# Patient Record
Sex: MALE | HISPANIC OR LATINO | URBAN - METROPOLITAN AREA
[De-identification: names, ages, dates, MRNs, and addresses within clinical notes are randomized per-mention and may not be internally consistent; named-entity substitution may affect disease eponyms.]

---

## 2018-01-11 ENCOUNTER — INPATIENT (INPATIENT)
Facility: HOSPITAL | Age: 68
LOS: 14 days | Discharge: HOME | DRG: 64 | End: 2018-01-26
Attending: HOSPITALIST | Admitting: INTERNAL MEDICINE
Payer: MEDICARE

## 2018-01-11 ENCOUNTER — EMERGENCY (EMERGENCY)
Facility: HOSPITAL | Age: 68
LOS: 1 days | Discharge: ANOTHER HOSPITAL | End: 2018-01-11
Attending: EMERGENCY MEDICINE | Admitting: EMERGENCY MEDICINE
Payer: MEDICARE

## 2018-01-11 ENCOUNTER — TRANSCRIPTION ENCOUNTER (OUTPATIENT)
Age: 68
End: 2018-01-11

## 2018-01-11 VITALS
DIASTOLIC BLOOD PRESSURE: 141 MMHG | HEIGHT: 61 IN | OXYGEN SATURATION: 95 % | HEART RATE: 94 BPM | TEMPERATURE: 97 F | SYSTOLIC BLOOD PRESSURE: 241 MMHG | WEIGHT: 164.91 LBS | RESPIRATION RATE: 16 BRPM

## 2018-01-11 VITALS
HEART RATE: 87 BPM | SYSTOLIC BLOOD PRESSURE: 172 MMHG | OXYGEN SATURATION: 99 % | DIASTOLIC BLOOD PRESSURE: 79 MMHG | RESPIRATION RATE: 19 BRPM

## 2018-01-11 VITALS — WEIGHT: 166.89 LBS

## 2018-01-11 DIAGNOSIS — I61.0 NONTRAUMATIC INTRACEREBRAL HEMORRHAGE IN HEMISPHERE, SUBCORTICAL: ICD-10-CM

## 2018-01-11 DIAGNOSIS — R41.82 ALTERED MENTAL STATUS, UNSPECIFIED: ICD-10-CM

## 2018-01-11 DIAGNOSIS — I16.9 HYPERTENSIVE CRISIS, UNSPECIFIED: ICD-10-CM

## 2018-01-11 DIAGNOSIS — I62.9 NONTRAUMATIC INTRACRANIAL HEMORRHAGE, UNSPECIFIED: ICD-10-CM

## 2018-01-11 LAB
ALBUMIN SERPL ELPH-MCNC: 3.9 G/DL — SIGNIFICANT CHANGE UP (ref 3.3–5)
ALP SERPL-CCNC: 153 U/L — HIGH (ref 40–120)
ALT FLD-CCNC: 20 U/L — SIGNIFICANT CHANGE UP (ref 12–78)
ANION GAP SERPL CALC-SCNC: 10 MMOL/L — SIGNIFICANT CHANGE UP (ref 5–17)
APTT BLD: 33.4 SEC — SIGNIFICANT CHANGE UP (ref 27.5–37.4)
AST SERPL-CCNC: 22 U/L — SIGNIFICANT CHANGE UP (ref 15–37)
BASOPHILS # BLD AUTO: 0.1 K/UL — SIGNIFICANT CHANGE UP (ref 0–0.2)
BASOPHILS NFR BLD AUTO: 1 % — SIGNIFICANT CHANGE UP (ref 0–2)
BILIRUB SERPL-MCNC: 0.6 MG/DL — SIGNIFICANT CHANGE UP (ref 0.2–1.2)
BUN SERPL-MCNC: 21 MG/DL — SIGNIFICANT CHANGE UP (ref 7–23)
CALCIUM SERPL-MCNC: 8.7 MG/DL — SIGNIFICANT CHANGE UP (ref 8.5–10.1)
CHLORIDE SERPL-SCNC: 98 MMOL/L — SIGNIFICANT CHANGE UP (ref 96–108)
CK MB BLD-MCNC: 1.7 % — SIGNIFICANT CHANGE UP (ref 0–3.5)
CK MB CFR SERPL CALC: 2.1 NG/ML — SIGNIFICANT CHANGE UP (ref 0–3.6)
CK SERPL-CCNC: 121 U/L — SIGNIFICANT CHANGE UP (ref 26–308)
CO2 SERPL-SCNC: 27 MMOL/L — SIGNIFICANT CHANGE UP (ref 22–31)
CREAT SERPL-MCNC: 1.3 MG/DL — SIGNIFICANT CHANGE UP (ref 0.5–1.3)
EOSINOPHIL # BLD AUTO: 1.1 K/UL — HIGH (ref 0–0.5)
EOSINOPHIL NFR BLD AUTO: 15 % — HIGH (ref 0–6)
GLUCOSE BLDC GLUCOMTR-MCNC: 272 MG/DL — HIGH (ref 70–99)
GLUCOSE SERPL-MCNC: 272 MG/DL — HIGH (ref 70–99)
HCT VFR BLD CALC: 41.3 % — SIGNIFICANT CHANGE UP (ref 34.5–45)
HGB BLD-MCNC: 14.2 G/DL — SIGNIFICANT CHANGE UP (ref 11.5–15.5)
INR BLD: 0.99 RATIO — SIGNIFICANT CHANGE UP (ref 0.88–1.16)
LYMPHOCYTES # BLD AUTO: 1.9 K/UL — SIGNIFICANT CHANGE UP (ref 1–3.3)
LYMPHOCYTES # BLD AUTO: 26.9 % — SIGNIFICANT CHANGE UP (ref 13–44)
MCHC RBC-ENTMCNC: 28.7 PG — SIGNIFICANT CHANGE UP (ref 27–34)
MCHC RBC-ENTMCNC: 34.3 GM/DL — SIGNIFICANT CHANGE UP (ref 32–36)
MCV RBC AUTO: 83.6 FL — SIGNIFICANT CHANGE UP (ref 80–100)
MONOCYTES # BLD AUTO: 0.5 K/UL — SIGNIFICANT CHANGE UP (ref 0–0.9)
MONOCYTES NFR BLD AUTO: 6.9 % — SIGNIFICANT CHANGE UP (ref 1–9)
NEUTROPHILS # BLD AUTO: 3.6 K/UL — SIGNIFICANT CHANGE UP (ref 1.8–7.4)
NEUTROPHILS NFR BLD AUTO: 50.3 % — SIGNIFICANT CHANGE UP (ref 43–77)
PLATELET # BLD AUTO: 244 K/UL — SIGNIFICANT CHANGE UP (ref 150–400)
POTASSIUM SERPL-MCNC: 3.8 MMOL/L — SIGNIFICANT CHANGE UP (ref 3.5–5.3)
POTASSIUM SERPL-SCNC: 3.8 MMOL/L — SIGNIFICANT CHANGE UP (ref 3.5–5.3)
PROT SERPL-MCNC: 8.2 G/DL — SIGNIFICANT CHANGE UP (ref 6–8.3)
PROTHROM AB SERPL-ACNC: 10.8 SEC — SIGNIFICANT CHANGE UP (ref 9.8–12.7)
RBC # BLD: 4.94 M/UL — SIGNIFICANT CHANGE UP (ref 3.8–5.2)
RBC # FLD: 11.5 % — SIGNIFICANT CHANGE UP (ref 10.3–14.5)
SODIUM SERPL-SCNC: 135 MMOL/L — SIGNIFICANT CHANGE UP (ref 135–145)
TROPONIN I SERPL-MCNC: <.015 NG/ML — SIGNIFICANT CHANGE UP (ref 0.01–0.04)
WBC # BLD: 7.2 K/UL — SIGNIFICANT CHANGE UP (ref 3.8–10.5)
WBC # FLD AUTO: 7.2 K/UL — SIGNIFICANT CHANGE UP (ref 3.8–10.5)

## 2018-01-11 PROCEDURE — 99291 CRITICAL CARE FIRST HOUR: CPT

## 2018-01-11 PROCEDURE — 70450 CT HEAD/BRAIN W/O DYE: CPT | Mod: 26

## 2018-01-11 PROCEDURE — 85730 THROMBOPLASTIN TIME PARTIAL: CPT

## 2018-01-11 PROCEDURE — 71045 X-RAY EXAM CHEST 1 VIEW: CPT

## 2018-01-11 PROCEDURE — 86900 BLOOD TYPING SEROLOGIC ABO: CPT

## 2018-01-11 PROCEDURE — 86850 RBC ANTIBODY SCREEN: CPT

## 2018-01-11 PROCEDURE — 70496 CT ANGIOGRAPHY HEAD: CPT | Mod: 26

## 2018-01-11 PROCEDURE — 70450 CT HEAD/BRAIN W/O DYE: CPT

## 2018-01-11 PROCEDURE — 86901 BLOOD TYPING SEROLOGIC RH(D): CPT

## 2018-01-11 PROCEDURE — 96374 THER/PROPH/DIAG INJ IV PUSH: CPT

## 2018-01-11 PROCEDURE — 72125 CT NECK SPINE W/O DYE: CPT | Mod: 26

## 2018-01-11 PROCEDURE — 93005 ELECTROCARDIOGRAM TRACING: CPT

## 2018-01-11 PROCEDURE — 82550 ASSAY OF CK (CPK): CPT

## 2018-01-11 PROCEDURE — 82553 CREATINE MB FRACTION: CPT

## 2018-01-11 PROCEDURE — 85027 COMPLETE CBC AUTOMATED: CPT

## 2018-01-11 PROCEDURE — 99291 CRITICAL CARE FIRST HOUR: CPT | Mod: 25

## 2018-01-11 PROCEDURE — 85610 PROTHROMBIN TIME: CPT

## 2018-01-11 PROCEDURE — 82962 GLUCOSE BLOOD TEST: CPT

## 2018-01-11 PROCEDURE — 96375 TX/PRO/DX INJ NEW DRUG ADDON: CPT

## 2018-01-11 PROCEDURE — 84484 ASSAY OF TROPONIN QUANT: CPT

## 2018-01-11 PROCEDURE — 71045 X-RAY EXAM CHEST 1 VIEW: CPT | Mod: 26

## 2018-01-11 PROCEDURE — 80053 COMPREHEN METABOLIC PANEL: CPT

## 2018-01-11 RX ORDER — NICARDIPINE HYDROCHLORIDE 30 MG/1
5 CAPSULE, EXTENDED RELEASE ORAL
Qty: 40 | Refills: 0 | Status: DISCONTINUED | OUTPATIENT
Start: 2018-01-11 | End: 2018-01-12

## 2018-01-11 RX ORDER — LABETALOL HCL 100 MG
20 TABLET ORAL ONCE
Refills: 0 | Status: COMPLETED | OUTPATIENT
Start: 2018-01-11 | End: 2018-01-11

## 2018-01-11 RX ORDER — LABETALOL HCL 100 MG
10 TABLET ORAL
Refills: 0 | Status: DISCONTINUED | OUTPATIENT
Start: 2018-01-11 | End: 2018-01-13

## 2018-01-11 RX ORDER — NICARDIPINE HYDROCHLORIDE 30 MG/1
5 CAPSULE, EXTENDED RELEASE ORAL
Qty: 50 | Refills: 0 | Status: DISCONTINUED | OUTPATIENT
Start: 2018-01-11 | End: 2018-01-15

## 2018-01-11 RX ORDER — INFLUENZA VIRUS VACCINE 15; 15; 15; 15 UG/.5ML; UG/.5ML; UG/.5ML; UG/.5ML
0.5 SUSPENSION INTRAMUSCULAR ONCE
Refills: 0 | Status: COMPLETED | OUTPATIENT
Start: 2018-01-11 | End: 2018-01-11

## 2018-01-11 RX ADMIN — NICARDIPINE HYDROCHLORIDE 25 MG/HR: 30 CAPSULE, EXTENDED RELEASE ORAL at 20:09

## 2018-01-11 RX ADMIN — NICARDIPINE HYDROCHLORIDE 25 MG/HR: 30 CAPSULE, EXTENDED RELEASE ORAL at 16:05

## 2018-01-11 RX ADMIN — Medication 10 MILLIGRAM(S): at 21:00

## 2018-01-11 RX ADMIN — Medication 20 MILLIGRAM(S): at 15:30

## 2018-01-11 NOTE — DISCHARGE NOTE ADULT - NS AS ACTIVITY OBS
No Heavy lifting/straining/Do not drive or operate machinery/Showering allowed/Walking-Indoors allowed/Walking-Outdoors allowed

## 2018-01-11 NOTE — DISCHARGE NOTE ADULT - MEDICATION SUMMARY - MEDICATIONS TO TAKE
I will START or STAY ON the medications listed below when I get home from the hospital:    aspirin 81 mg oral tablet, chewable  -- 1 tab(s) by mouth once a day  -- Indication: For Coronary artery disease involving native coronary artery of native heart with angina pectoris    cloNIDine 0.1 mg oral tablet  -- 1 tab(s) by mouth 2 times a day  -- Indication: For Essential hypertension    insulin glargine  -- 14 unit(s) subcutaneous once a day (at bedtime)  -- Indication: For Diabetes    atorvastatin 40 mg oral tablet  -- 1 tab(s) by mouth once a day (at bedtime)  -- Indication: For Coronary artery disease involving native coronary artery of native heart with angina pectoris    carvedilol 25 mg oral tablet  -- 1 tab(s) by mouth every 12 hours  -- Indication: For Essential hypertension    amLODIPine 10 mg oral tablet  -- 1 tab(s) by mouth once a day  -- Indication: For Essential hypertension    furosemide 20 mg oral tablet  -- 1 tab(s) by mouth once a day  -- Indication: For Cardiomyopathy, unspecified type    pantoprazole 40 mg oral delayed release tablet  -- 1 tab(s) by mouth once a day (before a meal)  -- Indication: For GERD    hydrALAZINE 50 mg oral tablet  -- 1 tab(s) by mouth 3 times a day  -- Indication: For Essential hypertension

## 2018-01-11 NOTE — ED ADULT NURSE NOTE - OBJECTIVE STATEMENT
BIBA from [place] for brain bleed with midline shift. Pt is awake, alert, unable to speak, cardiac and O2 monitors placed, Code Team One, Dr Deng at bedside. BIBA from [place] for brain bleed with midline shift. Pt is awake, alert, able to speak but slow to respond, cardiac and O2 monitors placed, Code Team One, Dr Deng at bedside. Pt reports patching his roof and does not remember what happened next. C-spine immobilization initiated. Pt c/o of pain to LLE and noted to have spasticity and twitching to same.

## 2018-01-11 NOTE — ED ADULT NURSE NOTE - OBJECTIVE STATEMENT
Pt. received alert and oriented x4 with chief complaint of falling down on roof while at work around 1330 today. Pt. presents w/ left sided facial droop w/ left sided arm weakness, and left sided leg numbness/weakness. Pt. placed on continuous cardiac monitor with continuos pulse ox.

## 2018-01-11 NOTE — ED PROVIDER NOTE - OBJECTIVE STATEMENT
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  Pt states he has residual weakness from previous CVA 1 year ago on the left but no has more ;eft upper and lower weakness/numbness and right facial droop.

## 2018-01-11 NOTE — CONSULT NOTE ADULT - SUBJECTIVE AND OBJECTIVE BOX
HISTORY OF PRESENT ILLNESS:   Mr. Valdez is a 68 y/o male, not followed regularly by a PCP, possible history of uncontrolled hypertension. Presented to [facility] after he recalls being on a ladder putting up a shingle when he believes he lost consciousness and fell from the ladder. Reports he does not recall any further details.   Upon initial work up HCT revealed L thalamic hemorrhage and was hypertensive on presentation. Due to imaging he was transferred to [facility] for further evaluation.   At time of evaluation in [facility]-ED, pt is denying neck pain. He is exhibiting myoclonic jerking vs. spasticity of the LLE, occasionally of the RLE as well and reports nonspecific L arm pain. Denies low back pain. He denies any history of seizures. Denies loss of bowel or bladder control. Denies any other medical or surgical history.     PAST MEDICAL & SURGICAL HISTORY:  HTN   Denies significant past surgical history    FAMILY HISTORY:  Unable to obtain    SOCIAL HISTORY:  Denies tobacco or alcohol use  Employed    Allergies  No Known Allergies    REVIEW OF SYSTEMS  Negative as per HPI    MEDICATIONS:  Anticoagulation:  Denies  OTHER:  niCARdipine Infusion 5 mG/Hr IV Continuous    Vital Signs Last 24 Hrs  T(C): 36.3 (11 Jan 2018 17:33), Max: 36.3 (11 Jan 2018 17:33)  T(F): 97.4 (11 Jan 2018 17:33), Max: 97.4 (11 Jan 2018 17:33)  HR: 92 (11 Jan 2018 18:10) (86 - 92)  BP: 162/79 (11 Jan 2018 18:10) (153/98 - 172/84)  RR: 20 (11 Jan 2018 18:10) (20 - 24)  SpO2: 99% (11 Jan 2018 18:10) (99% - 99%)  PHYSICAL EXAM:  GENERAL: NAD, well-developed  HEAD:  Atraumatic, normocephalic  EYES: Conjunctiva and sclera gonzales  ENMT: No tonsillar erythema, exudates, or enlargement; moist mucous membranes  NECK: Denies pain to palpation, no step offs palpated, no nuchal rigidity  MENTAL STATUS: Awake, alert, oriented to month and year, not oriented to place, oriented to self. Following commands, mild dysarthria per , no overt expressive or comprehensive aphasia  CRANIAL NERVES: PERRL 4mm; EOMI; mild diminished L NLF, facial sensation unable to evaluate  MOTOR: strength 5/5 RUE/RLE. 4-/5 L elbows, distal fine motor function of the left intact, antigravity proximal LUE. LLE 2+/5, RLE grossly intact. + LUE/LLE rigidity with intermittent high frequency low amplitude spastic vs. myoclonic jerking <3seconds, intermittently in RLE as well, lower extremities moreso than right  SENSATION: Extinction to double sided simultaneous stimulation on the left upper and lower extremities  MUSCLE STRETCH REFLEXES: +clonus L>R  PLANTAR: upgoing L  CHEST/LUNG: Clear to auscultation bilaterally  HEART: +S1/+S2; Regular rate and rhythm  ABDOMEN: Soft, nontender, nondistended; bowel sounds present all four quadrants  EXTREMITIES:  2+ peripheral pulses, no clubbing, cyanosis, or edema  SKIN: Warm, dry; no rashes or lesions    LABS:  PENDING    RADIOLOGY & ADDITIONAL STUDIES:  CTH from [place] in [facility]    CTH/CTA/CTV/CTCSPINE pending

## 2018-01-11 NOTE — DISCHARGE NOTE ADULT - SECONDARY DIAGNOSIS.
Coronary artery disease involving native coronary artery of native heart with angina pectoris Acute cholecystitis due to biliary calculus Essential hypertension Diabetes

## 2018-01-11 NOTE — DISCHARGE NOTE ADULT - PLAN OF CARE
Optimal cardiac function No heavy lifting, driving, sex, tub baths, swimming, or any activity that submerges the lower half of the body in water for 48 hours.  Limited walking and stairs for 48 hours.    Observe the site frequently.  If bleeding or a large lump (the size of a golf ball or bigger) occurs lie flat, apply continuous direct pressure just above the puncture site for at least 10 minutes, and notify your physician immediately.  If the bleeding cannot be controlled, call 911 immediately for assistance.  Notify your physician of pain, swelling or any drainage.    Notify your physician immediately if coldness, numbness, discoloration or pain in your foot occurs. Continue current medications as prescribed.  Follow up with primary doctor. Rehab Continue current medications as prescribed.  Follow up with primary doctor.  Follow up with cardiology - will need eventual cardiac Cath with stent placement when able to take ASA and Plavix. Continue rehab. Completed antibiotics.  Follow up with surgery in 4-6 weeks.  Noemi tube to be removed around 3/3/18.

## 2018-01-11 NOTE — DISCHARGE NOTE ADULT - NS AS DC FOLLOWUP STROKE INST
Stroke (includes: TIA/SAH/ICH/Ischemic Stroke) Influenza vaccination (VIS Pub Date: August 19, 2014)/Stroke (includes: TIA/SAH/ICH/Ischemic Stroke)

## 2018-01-11 NOTE — DISCHARGE NOTE ADULT - HOSPITAL COURSE
67 yom transferred from [place] after having left sided weakness found to have ICH likely hypertensive. Pt was initially managed in ICU until stable, stroke workup complete, neurology following.  Cardio decided to cath pt yesterday as part of workup and pt was NPO, post procedure he was found to be extremely hypertensive with HTN crisis and was brought to ICU BP mgt.  Pt was found to have a 70% midLAD lesion that will be medically managed for now due to fact that he is currently not candidate for ASA/Plavix due to ICH> spoke with cardio NP and pt not on ASA/Plavix at this time due to ICH and they are aware of this, eventually they will d/w neurology/NS about when it will be safe to restart these meds. s/p ICU downgrade. Hospital course complicated by abdominal pain, N/V suspected acute brooks. Now s/p cholecystostomy by IR, completed IV ab. Cr improved with IVF.  Antihypertensives adjusted with improvement.  Patient evaluated by PT and PM&R and recommended Acute rehab.  Patient is stable for discharge to acute rehab when bed available. Patient will need follow up with surgery for removal of brooks tube in 4-6 weeks which will be around 3/3/18.  Patient also needs to follow up with cardiology for eventual cardiac Cath with stent placement when patient able to take ASA and Plavix. 67 yom transferred from [place] after having left sided weakness found to have ICH likely hypertensive. Pt was initially managed in ICU until stable, stroke workup complete, neurology following.  Cardio decided to cath pt yesterday as part of workup and pt was NPO, post procedure he was found to be extremely hypertensive with HTN crisis and was brought to ICU BP mgt.  Pt was found to have a 70% midLAD lesion that will be medically managed for now due to fact that he is currently not candidate for ASA/Plavix due to ICH> spoke with cardio NP and pt not on ASA/Plavix at this time due to ICH and they are aware of this, eventually they will d/w neurology/NS about when it will be safe to restart these meds. s/p ICU downgrade. Hospital course complicated by abdominal pain, N/V suspected acute brooks. Now s/p cholecystostomy by IR, completed IV ab. Cr improved with IVF.  Antihypertensives adjusted with improvement.  Patient evaluated by PT and PM&R and recommended Acute rehab.  Patient is stable for discharge to acute rehab when bed available. Patient will need follow up with surgery for removal of brooks tube in 4-6 weeks which will be around 3/3/18.  Patient also needs to follow up with cardiology for eventual cardiac Cath with stent placement when patient able to take ASA and Plavix.      Time to discharge: >35 minutes spent coordinating

## 2018-01-11 NOTE — H&P ADULT - PROBLEM SELECTOR PLAN 1
Likely related to uncontrolled HTN  Need to exclude vascular injury. CTA/V now being done. Await formal read and will discuss with Neurosurgery  Continue hourly Neurochecks  Continue IV Cardene to maintain SBP < 150, may need to add additional IV meds.  Keep NPO for NOw  StroKe Protocol  Neurology consult

## 2018-01-11 NOTE — ED ADULT NURSE REASSESSMENT NOTE - NS ED NURSE REASSESS COMMENT FT1
bedside report given to Brendon STUART in the MICU, pt status unchanged, refer to flowsheet and chart, pt safety maintained, pt hemodynamically stable on cardene gtt

## 2018-01-11 NOTE — ED ADULT TRIAGE NOTE - CHIEF COMPLAINT QUOTE
pt transfer from [place] with intracranial bleed left sided numbness/weakness. patient arrives on cardene 7.5. to critical care pod for further treatment. code team 1 called to bedside.

## 2018-01-11 NOTE — ED PROVIDER NOTE - OBJECTIVE STATEMENT
Pt is a transfer from [place] for intracranial bleeding, accepted by duy gallagher. pt with hpyertensive bleed of right thalamus and corona radiata. Pt in the ED confused with some questions. pt attempting to follow commands and answers, but unable to obtain additional history secondary to emergent need for treatment. Pt is a transfer from [place] for intracranial bleeding, accepted by duy gallagher. pt with hypertensive bleed of right thalamus and corona radiata. Pt in the ED confused with some questions. pt attempting to follow commands and answers, but unable to obtain additional history secondary to emergent need for treatment.

## 2018-01-11 NOTE — ED PROVIDER NOTE - MEDICAL DECISION MAKING DETAILS
pt with hx of cva and htn with high BP  rigth facial droop and left ext numbness/weakness.  ct with hemorrhagic stroke.  transfer for higher level of care

## 2018-01-11 NOTE — DISCHARGE NOTE ADULT - CARE PLAN
Goal:	Optimal cardiac function  Assessment and plan of treatment:	No heavy lifting, driving, sex, tub baths, swimming, or any activity that submerges the lower half of the body in water for 48 hours.  Limited walking and stairs for 48 hours.    Observe the site frequently.  If bleeding or a large lump (the size of a golf ball or bigger) occurs lie flat, apply continuous direct pressure just above the puncture site for at least 10 minutes, and notify your physician immediately.  If the bleeding cannot be controlled, call 911 immediately for assistance.  Notify your physician of pain, swelling or any drainage.    Notify your physician immediately if coldness, numbness, discoloration or pain in your foot occurs. Principal Discharge DX:	Intracranial bleed  Goal:	Rehab  Assessment and plan of treatment:	Continue rehab.  Secondary Diagnosis:	Acute cholecystitis due to biliary calculus  Assessment and plan of treatment:	Completed antibiotics.  Follow up with surgery in 4-6 weeks.  Noemi tube to be removed around 3/3/18.  Secondary Diagnosis:	Coronary artery disease involving native coronary artery of native heart with angina pectoris  Assessment and plan of treatment:	Continue current medications as prescribed.  Follow up with primary doctor.  Follow up with cardiology - will need eventual cardiac Cath with stent placement when able to take ASA and Plavix.  Secondary Diagnosis:	Diabetes  Assessment and plan of treatment:	Continue current medications as prescribed.  Follow up with primary doctor.  Secondary Diagnosis:	Essential hypertension  Assessment and plan of treatment:	Continue current medications as prescribed.  Follow up with primary doctor.

## 2018-01-11 NOTE — DISCHARGE NOTE ADULT - CARE PROVIDER_API CALL
Gilbert Banegas), Surgery  [address]  [address]  [place] [address]  Phone: [phone]  Fax: [phone]    Kenneth Andrew), Neurology; Vascular Neurology  [address]  [address]  [place] [address]  Phone: [phone]  Fax: [phone]    Wil Rob ([facility]), Cardiology; Cardiovascular Disease; Interventional Cardiology  [address]  [address] [place] [id number]  Phone: [phone]  Fax: [phone]

## 2018-01-11 NOTE — DISCHARGE NOTE ADULT - PATIENT PORTAL LINK FT
“You can access the [employer] Patient Portal, offered by [facility], by registering with the following website: [email or web address]”

## 2018-01-11 NOTE — DISCHARGE NOTE ADULT - MEDICATION SUMMARY - MEDICATIONS TO STOP TAKING
I will STOP taking the medications listed below when I get home from the hospital:    metFORMIN 500 mg oral tablet  -- 1 tab(s) by mouth 2 times a day    enalapril 10 mg oral tablet  -- 1 tab(s) by mouth 2 times a day    metoprolol tartrate 50 mg oral tablet  -- 1 tab(s) by mouth 2 times a day

## 2018-01-11 NOTE — H&P ADULT - HISTORY OF PRESENT ILLNESS
66M transferred from [facility]. PMHX of HTN He presented AMS. Head Ct showed a Right Thalamic Hemorrhagic Bleed with some Effacement of the Lateral Ventricle and surrounding Edema.  He was brought to [facility] for further management and Neurosurgical evaluation. His BP in the ER was 170 and he was placed on a Cardene Infusion.

## 2018-01-11 NOTE — H&P ADULT - ASSESSMENT
IMpression/plan    66M with Hemorrhagic CVA and uncontrolled HTN now requiring IV Cardene for BP control

## 2018-01-12 LAB
GLUCOSE BLDC GLUCOMTR-MCNC: 298 MG/DL — HIGH (ref 70–99)
TROPONIN T SERPL-MCNC: <0.01 NG/ML — SIGNIFICANT CHANGE UP (ref 0–0.06)

## 2018-01-12 PROCEDURE — 93010 ELECTROCARDIOGRAM REPORT: CPT

## 2018-01-12 PROCEDURE — 99291 CRITICAL CARE FIRST HOUR: CPT

## 2018-01-12 PROCEDURE — 70450 CT HEAD/BRAIN W/O DYE: CPT | Mod: 26

## 2018-01-12 PROCEDURE — 99232 SBSQ HOSP IP/OBS MODERATE 35: CPT

## 2018-01-12 RX ORDER — INSULIN LISPRO 100/ML
VIAL (ML) SUBCUTANEOUS
Refills: 0 | Status: DISCONTINUED | OUTPATIENT
Start: 2018-01-12 | End: 2018-01-26

## 2018-01-12 RX ORDER — DEXTROSE 50 % IN WATER 50 %
25 SYRINGE (ML) INTRAVENOUS ONCE
Refills: 0 | Status: DISCONTINUED | OUTPATIENT
Start: 2018-01-12 | End: 2018-01-26

## 2018-01-12 RX ORDER — INSULIN LISPRO 100/ML
VIAL (ML) SUBCUTANEOUS AT BEDTIME
Refills: 0 | Status: DISCONTINUED | OUTPATIENT
Start: 2018-01-12 | End: 2018-01-26

## 2018-01-12 RX ORDER — DEXTROSE 50 % IN WATER 50 %
12.5 SYRINGE (ML) INTRAVENOUS ONCE
Refills: 0 | Status: DISCONTINUED | OUTPATIENT
Start: 2018-01-12 | End: 2018-01-26

## 2018-01-12 RX ORDER — GLUCAGON INJECTION, SOLUTION 0.5 MG/.1ML
1 INJECTION, SOLUTION SUBCUTANEOUS ONCE
Refills: 0 | Status: DISCONTINUED | OUTPATIENT
Start: 2018-01-12 | End: 2018-01-26

## 2018-01-12 RX ORDER — LABETALOL HCL 100 MG
100 TABLET ORAL EVERY 12 HOURS
Refills: 0 | Status: DISCONTINUED | OUTPATIENT
Start: 2018-01-12 | End: 2018-01-12

## 2018-01-12 RX ORDER — HYDROMORPHONE HYDROCHLORIDE 2 MG/ML
0.5 INJECTION INTRAMUSCULAR; INTRAVENOUS; SUBCUTANEOUS
Refills: 0 | Status: DISCONTINUED | OUTPATIENT
Start: 2018-01-12 | End: 2018-01-13

## 2018-01-12 RX ORDER — LABETALOL HCL 100 MG
100 TABLET ORAL EVERY 12 HOURS
Refills: 0 | Status: DISCONTINUED | OUTPATIENT
Start: 2018-01-12 | End: 2018-01-13

## 2018-01-12 RX ORDER — ACETAMINOPHEN 500 MG
1000 TABLET ORAL ONCE
Refills: 0 | Status: COMPLETED | OUTPATIENT
Start: 2018-01-12 | End: 2018-01-12

## 2018-01-12 RX ORDER — AMLODIPINE BESYLATE 2.5 MG/1
10 TABLET ORAL DAILY
Refills: 0 | Status: DISCONTINUED | OUTPATIENT
Start: 2018-01-12 | End: 2018-01-26

## 2018-01-12 RX ORDER — DEXTROSE 50 % IN WATER 50 %
1 SYRINGE (ML) INTRAVENOUS ONCE
Refills: 0 | Status: DISCONTINUED | OUTPATIENT
Start: 2018-01-12 | End: 2018-01-26

## 2018-01-12 RX ORDER — AMLODIPINE BESYLATE 2.5 MG/1
10 TABLET ORAL DAILY
Refills: 0 | Status: DISCONTINUED | OUTPATIENT
Start: 2018-01-12 | End: 2018-01-12

## 2018-01-12 RX ORDER — SODIUM CHLORIDE 9 MG/ML
1000 INJECTION, SOLUTION INTRAVENOUS
Refills: 0 | Status: DISCONTINUED | OUTPATIENT
Start: 2018-01-12 | End: 2018-01-26

## 2018-01-12 RX ORDER — HYDRALAZINE HCL 50 MG
10 TABLET ORAL ONCE
Refills: 0 | Status: COMPLETED | OUTPATIENT
Start: 2018-01-12 | End: 2018-01-12

## 2018-01-12 RX ADMIN — Medication 10 MILLIGRAM(S): at 04:40

## 2018-01-12 RX ADMIN — HYDROMORPHONE HYDROCHLORIDE 0.5 MILLIGRAM(S): 2 INJECTION INTRAMUSCULAR; INTRAVENOUS; SUBCUTANEOUS at 02:54

## 2018-01-12 RX ADMIN — AMLODIPINE BESYLATE 10 MILLIGRAM(S): 2.5 TABLET ORAL at 21:22

## 2018-01-12 RX ADMIN — Medication 10 MILLIGRAM(S): at 18:11

## 2018-01-12 RX ADMIN — Medication 10 MILLIGRAM(S): at 20:15

## 2018-01-12 RX ADMIN — HYDROMORPHONE HYDROCHLORIDE 0.5 MILLIGRAM(S): 2 INJECTION INTRAMUSCULAR; INTRAVENOUS; SUBCUTANEOUS at 19:26

## 2018-01-12 RX ADMIN — Medication 10 MILLIGRAM(S): at 15:03

## 2018-01-12 RX ADMIN — Medication 1000 MILLIGRAM(S): at 11:10

## 2018-01-12 RX ADMIN — Medication 1: at 21:22

## 2018-01-12 RX ADMIN — Medication 10 MILLIGRAM(S): at 16:38

## 2018-01-12 RX ADMIN — Medication 10 MILLIGRAM(S): at 21:22

## 2018-01-12 RX ADMIN — Medication 400 MILLIGRAM(S): at 10:31

## 2018-01-12 RX ADMIN — Medication 10 MILLIGRAM(S): at 13:56

## 2018-01-12 RX ADMIN — HYDROMORPHONE HYDROCHLORIDE 0.5 MILLIGRAM(S): 2 INJECTION INTRAMUSCULAR; INTRAVENOUS; SUBCUTANEOUS at 19:45

## 2018-01-12 RX ADMIN — Medication 100 MILLIGRAM(S): at 11:59

## 2018-01-12 RX ADMIN — NICARDIPINE HYDROCHLORIDE 25 MG/HR: 30 CAPSULE, EXTENDED RELEASE ORAL at 01:18

## 2018-01-12 RX ADMIN — HYDROMORPHONE HYDROCHLORIDE 0.5 MILLIGRAM(S): 2 INJECTION INTRAMUSCULAR; INTRAVENOUS; SUBCUTANEOUS at 01:17

## 2018-01-12 RX ADMIN — Medication 10 MILLIGRAM(S): at 10:11

## 2018-01-12 RX ADMIN — Medication 100 MILLIGRAM(S): at 18:11

## 2018-01-12 RX ADMIN — Medication 10 MILLIGRAM(S): at 23:11

## 2018-01-12 RX ADMIN — Medication 10 MILLIGRAM(S): at 06:28

## 2018-01-12 NOTE — SWALLOW BEDSIDE ASSESSMENT ADULT - SWALLOW EVAL: RECOMMENDED FEEDING/EATING TECHNIQUES
oral residue not observed however pt is at risk, given facial weakness/allow for swallow between intakes/check mouth frequently for oral residue/pocketing/crush medication (when feasible)/no straws/oral hygiene/position upright (90 degrees)/small sips/bites

## 2018-01-12 NOTE — PROGRESS NOTE ADULT - SUBJECTIVE AND OBJECTIVE BOX
INTERVAL HPI/OVERNIGHT EVENTS:  67y Male found passed out on the roof of a greenhouse, transferred from [facility] with R thalamic hemorrhage, SBP 170s-180s upon arrival to [facility]. Patient seen earlier this AM, lying comfortably in bed, complaining of a mild headache. L sided weakness remains. Repeat CT head this AM stable. Denies paresthesias, chest pain, palpitations, SOB.     Vital Signs Last 24 Hrs  T(C): 36.8 (12 Jan 2018 09:00), Max: 37.2 (12 Jan 2018 04:00)  T(F): 98.3 (12 Jan 2018 09:00), Max: 98.9 (12 Jan 2018 04:00)  HR: 64 (12 Jan 2018 10:30) (64 - 98)  BP: 169/81 (12 Jan 2018 10:30) (137/58 - 281/140)  BP(mean): 116 (12 Jan 2018 10:30) (90 - 121)  RR: 26 (12 Jan 2018 10:30) (10 - 39)  SpO2: 99% (12 Jan 2018 10:30) (93% - 99%)    PHYSICAL EXAM:  GENERAL: NAD, well-groomed, well-developed  HEAD:  Atraumatic, normocephalic  MENTAL STATUS: AAO x3; Awake; Opens eyes spontaneously; Appropriately conversant without aphasia; following commands  CRANIAL NERVES: PERRL ~3mm; EOMI; mildly diminished left nasolabial fold; facial sensation grossly intact to light touch b/l;  tongue midline  MOTOR:  Uppers     Delt     Bicep     Tricep     HG  R               5/5       5/5         5/5      5/5  L               0/5       2/5         2/5      4-/5  Lowers      HF     KF      PF     DF     EHL  R             5/5     5/5     5/5    5/5   5/5   L              0/5    2/5     2/5    2/5   2/5    SENSATION: extinction to double sided simultaneous stimulation on LUE/LLE. Grossly intact to light touch RUE/RLE  MUSCLE STRETCH REFLEXES: +clonus L>R  PLANTAR: Upgoing on left foot  CHEST/LUNG: Clear to auscultation bilaterally  HEART: +S1/+S2; Regular rate and rhythm  ABDOMEN: Soft, nontender, nondistended  SKIN: Warm, dry    LABS:                        14.2   7.2   )-----------( 244      ( 11 Jan 2018 15:22 )             41.3     01-11    135  |  98  |  21  ----------------------------<  272<H>  3.8   |  27  |  1.30    Ca    8.7      11 Jan 2018 15:22    TPro  8.2  /  Alb  3.9  /  TBili  0.6  /  DBili  x   /  AST  22  /  ALT  20  /  AlkPhos  153<H>  01-11    PT/INR - ( 11 Jan 2018 15:22 )   PT: 10.8 sec;   INR: 0.99 ratio         PTT - ( 11 Jan 2018 15:22 )  PTT:33.4 sec      01-11 @ 07:01  -  01-12 @ 07:00  --------------------------------------------------------  IN: 325 mL / OUT: 550 mL / NET: -225 mL    RADIOLOGY & ADDITIONAL TESTS:  - from: CT Head No Cont (01.12.18 @ 08:50)  Impression: No change right thalamic intracerebral hematoma since the   prior day. No evidence of midline shift on the current study. Mild   persistent surrounding edema and mass effect in the right lateral   ventricle.  Again noted is intraventricular extension. Layering blood in bilateral   occipital horns on the current study..    - from: CT Cervical Spine No Cont (01.11.18 @ 18:22)  Impression:   Degenerative changes. No evidence of fracture..    - from: CT Angio Head w/ IV Cont (01.11.18 @ 18:22)  IMPRESSION:       1.  Approximately 2.8 cm hemorrhage within the upper right diencephalon   and   within the mid and posterior right corona radiata, no apparent associated   vascular abnormality, mild to moderate surrounding vasogenic edema. Small   amount of intraventricular hemorrhage within the right ventricular   trigone and   right occipital horn. No apparent extravasation of contrast into the   hematoma   to suggestactive hemorrhage. Mass effect on the right lateral ventricle   and   third ventricle and midline shift to the left by approximately 3.5 mm.  2.  No significant stenosis, aneurysm/vascular abnormality or proximal   intraluminal filling defect involving the anterior circulation.  3.  Limited intraluminal contrast within the right V3 segment and tracing   intraluminal contrast within the right V4 segment.  Question acute or   chronic   stenosis/dissection, presence of intraluminal thrombus not excluded.    ******PRELIMINARY REPORT******      - from: CT Head No Cont (01.11.18 @ 15:30)  Impression:  Acute intracranial hemorrhage right thalamus and corona radiata with mass   effect and mild leftward midline shift.  Asymmetry, increased density and heterogeneity along the tentorium and   dural sinuses, may represent subtle tentorial subarachnoid hemorrhage,   cannot exclude dural sinus thrombosis.  Recommend further evaluation with MRI/MRV of the brain as clinically   indicated.

## 2018-01-12 NOTE — SWALLOW BEDSIDE ASSESSMENT ADULT - COMMENTS
66M transferred from [facility]. PMHX of HTN He presented AMS. Head Ct showed a Right Thalamic Hemorrhagic Bleed with some Effacement of the Lateral Ventricle and surrounding Edema.

## 2018-01-12 NOTE — PROVIDER CONTACT NOTE (EICU) - RECOMMENDATIONS
-ekg ordered as per RN, will be reviewed by primary team  - trops ordered as per RN, will be followed up by primary team

## 2018-01-12 NOTE — CHART NOTE - NSCHARTNOTEFT_GEN_A_CORE
-patient had been complaing of CP Brian grajedaU attending ad ordered trops and EKG  -EKG unchanged from prior, and trop (-)  -continue to trend, morphine for CP    -also, patient HYPERTENSIVE, had been on cardene drip  -have re-staretd home antihypertensives, including ACE-I, amlodipine, and labetalol    -CTM

## 2018-01-12 NOTE — PROGRESS NOTE ADULT - SUBJECTIVE AND OBJECTIVE BOX
Patient is a 67y old  Male who presents with a chief complaint of stroke (11 Jan 2018 21:41)      BRIEF HOSPITAL COURSE: ICH right BG secondary to HTN, stable this am, clear c-collar    Events last 24 hours: passed dysphgia    PAST MEDICAL & SURGICAL HISTORY:  No pertinent past medical history  HTN (hypertension)  CVA (cerebral vascular accident)  Diabetes  No significant past surgical history  No significant past surgical history      Review of Systems:  CONSTITUTIONAL: No fever, chills, or fatigue  EYES: No eye pain, visual disturbances, or discharge  ENMT:  No difficulty hearing, tinnitus, vertigo; No sinus or throat pain  NECK: No pain or stiffness  RESPIRATORY: No cough, wheezing, chills or hemoptysis; No shortness of breath  CARDIOVASCULAR: No chest pain, palpitations, dizziness, or leg swelling  GASTROINTESTINAL: No abdominal or epigastric pain. No nausea, vomiting, or hematemesis; No diarrhea or constipation. No melena or hematochezia.  GENITOURINARY: No dysuria, frequency, hematuria, or incontinence  NEUROLOGICAL: No headaches, memory loss, loss of strength, numbness, or tremors  SKIN: No itching, burning, rashes, or lesions   MUSCULOSKELETAL: No joint pain or swelling; No muscle, back, or extremity pain  PSYCHIATRIC: No depression, anxiety, mood swings, or difficulty sleeping      Medications:    amLODIPine   Tablet 10 milliGRAM(s) Oral daily  enalapril 10 milliGRAM(s) Oral two times a day  labetalol 100 milliGRAM(s) Oral every 12 hours  labetalol Injectable 10 milliGRAM(s) IV Push every 1 hour PRN      HYDROmorphone  Injectable 0.5 milliGRAM(s) IV Push every 2 hours PRN            dextrose 50% Injectable 12.5 Gram(s) IV Push once  dextrose 50% Injectable 25 Gram(s) IV Push once  dextrose 50% Injectable 25 Gram(s) IV Push once  dextrose Gel 1 Dose(s) Oral once PRN  glucagon  Injectable 1 milliGRAM(s) IntraMuscular once PRN  insulin lispro (HumaLOG) corrective regimen sliding scale   SubCutaneous three times a day before meals  insulin lispro (HumaLOG) corrective regimen sliding scale   SubCutaneous at bedtime    dextrose 5%. 1000 milliLiter(s) IV Continuous <Continuous>    influenza   Vaccine 0.5 milliLiter(s) IntraMuscular once              ICU Vital Signs Last 24 Hrs  T(C): 36.9 (12 Jan 2018 20:00), Max: 37.2 (12 Jan 2018 04:00)  T(F): 98.4 (12 Jan 2018 20:00), Max: 98.9 (12 Jan 2018 04:00)  HR: 84 (12 Jan 2018 22:30) (51 - 95)  BP: 166/78 (12 Jan 2018 22:30) (137/58 - 195/94)  BP(mean): 112 (12 Jan 2018 22:30) (90 - 138)  ABP: --  ABP(mean): --  RR: 15 (12 Jan 2018 22:30) (10 - 38)  SpO2: 98% (12 Jan 2018 22:30) (93% - 100%)          I&O's Detail    11 Jan 2018 07:01  -  12 Jan 2018 07:00  --------------------------------------------------------  IN:    niCARdipine Infusion: 325 mL  Total IN: 325 mL    OUT:    Voided: 550 mL  Total OUT: 550 mL    Total NET: -225 mL      12 Jan 2018 07:01  -  12 Jan 2018 22:57  --------------------------------------------------------  IN:    IV PiggyBack: 100 mL    Oral Fluid: 240 mL  Total IN: 340 mL    OUT:    Voided: 525 mL  Total OUT: 525 mL    Total NET: -185 mL            LABS:                        14.2   7.2   )-----------( 244      ( 11 Jan 2018 15:22 )             41.3     01-11    135  |  98  |  21  ----------------------------<  272<H>  3.8   |  27  |  1.30    Ca    8.7      11 Jan 2018 15:22    TPro  8.2  /  Alb  3.9  /  TBili  0.6  /  DBili  x   /  AST  22  /  ALT  20  /  AlkPhos  153<H>  01-11      CARDIAC MARKERS ( 12 Jan 2018 20:08 )  x     / <0.01 ng/mL / x     / x     / x      CARDIAC MARKERS ( 11 Jan 2018 15:22 )  <.015 ng/mL / x     / 121 U/L / x     / 2.1 ng/mL      CAPILLARY BLOOD GLUCOSE      POCT Blood Glucose.: 298 mg/dL (12 Jan 2018 21:18)    PT/INR - ( 11 Jan 2018 15:22 )   PT: 10.8 sec;   INR: 0.99 ratio         PTT - ( 11 Jan 2018 15:22 )  PTT:33.4 sec    CULTURES:      Physical Examination:    General: No acute distress.  Alert, oriented, interactive, nonfocal    HEENT: Pupils equal, reactive to light.  Symmetric.    PULM: Clear to auscultation bilaterally, no significant sputum production    CVS: Regular rate and rhythm, no murmurs, rubs, or gallops    ABD: Soft, nondistended, nontender, normoactive bowel sounds, no masses    EXT: No edema, nontender    SKIN: Warm and well perfused, no rashes noted.    Neuro: left 2/5 UE, 1/5 LE, mild dysartheria    RADIOLOGY: < from: CT Angio Head w/ IV Cont (01.11.18 @ 18:22) >  EXAM:  CT ANGIO BRAIN (W)AW IC                          *** ADDENDUM 01/12/2018  ***    Addendum: The CTV portion of this exam demonstrates no evidence of venous   abnormalities.      *** END OF ADDENDUM 01/12/2018  ***      PROCEDURE DATE:  01/11/2018          INTERPRETATION:  Addendum created by Manuel Resendiz MD on 1/11/2018   8:46:08 PM EST     THIS REPORT CONTAINS FINDINGS THAT MAY BE CRITICAL TO PATIENT CARE. The   findings were verbally communicated via telephone conference with DEBI Loyola at 8:45 PM EST on 1/11/2018. The findings were acknowledged and   understood.  Initial report created on 1/11/2018 8:41:54 PM EST     EXAM:    CT Angiography Head Without and With Intravenous Contrast    EXAM DATE/TIME:    1/11/2018 5:51PM    < end of copied text >  < from: CT Angio Head w/ IV Cont (01.11.18 @ 18:22) >  CLINICAL HISTORY:  Right facial droop and altered mental status.    TECHNIQUE:    Axial computed tomographic angiography images of the head without and   with   intravenous contrast using CT angiography protocol. Additionally, CTV   images wereobtained. Three-D reformatted images were obtained.    MIP reconstructed images were created and reviewed.    Coronal and sagittal reformatted images were created and reviewed.    CONTRAST:    92 mL of omni administered intravenously.    COMPARISON:    No relevant prior studies available.    FINDINGS:     VASCULATURE:    Right internal carotid artery:  No significant stenosis.    Right anterior cerebral artery:  No occlusion or significant stenosis.    No   aneurysm.    Right middle cerebral artery:  No occlusion or significant stenosis.    No   aneurysm.    Right posterior cerebral artery:  No occlusion or significant stenosis.     < end of copied text >  < from: CT Angio Head w/ IV Cont (01.11.18 @ 18:22) >   No   aneurysm.    Right vertebral artery:  Hypoplastic right vertebral artery. Limited   intraluminal contrast within the right V3 segment and tracing   intraluminal   contrast within the right V4 segment.      Left internal carotid artery:  No significant stenosis.    Left anterior cerebral artery:  No occlusion or significant stenosis.    No   aneurysm.    Left middle cerebral artery:  No occlusion or significant stenosis.  No   aneurysm.    Left posterior cerebral artery:  No occlusion or significant stenosis.    No   aneurysm.    Left vertebral artery:  No significant stenosis or dissection.      Basilar artery:  No significant stenosis.     HEAD:    Brain:  Approximately 2.8 cm hemorrhage within the upper right   diencephalon   and within the mid and posterior right corona radiata, no apparent   associated   vascular abnormality, mild to moderate surrounding vasogenic edema. Small   amount of intraventricular hemorrhage within the right ventricular   trigone and   right occipital horn. No apparent extravasation of contrast into the   hematoma   to suggest active hemorrhage. Mass effect on the right lateral ventricle   and   third ventricle and minimal midline shift to the left by approximately 2   mm.      < end of copied text >  < from: CT Angio Head w/ IV Cont (01.11.18 @ 18:22) >  Ventricles:  No ventriculomegaly.    Bones/joints:  No acute fracture.    Sinuses:  Moderate mucosal reaction within the left maxillary sinus and   mild   mucosal reaction within ethmoid air cells.    Mastoid air cells:  No mastoid effusion.    IMPRESSION:       1.  Approximately 2.8 cm hemorrhage within the upper right diencephalon   and   within the mid and posterior right corona radiata, no apparent associated   vascular abnormality, mild to moderate surrounding vasogenic edema. Small   amount of intraventricular hemorrhage within the right ventricular   trigone and   right occipital horn. No apparent extravasation of contrast into the   hematoma   to suggest active hemorrhage. Mass effect on the right lateral ventricle   and   third ventricle and minimal right to left midline shift of approximately   2 mm.    2.  No significant stenosis, aneurysm/vascular abnormality or proximal   intraluminal filling defect involving the anterior circulation.  3.  Limited intraluminal contrast within the right V3 segment and tracing   intraluminal contrast within the right V4 segment.  Question acute or     < end of copied text >  < from: CT Angio Head w/ IV Cont (01.11.18 @ 18:22) >  chronic   stenosis/dissection, presence of intraluminal thrombus not excluded.      ***Please see the addendum at the top of this report. It may contain   additional important information or changes.****          REILLY BOURGEOIS M.D., ATTENDING RADIOLOGIST  This document has been electronically signed. Jan 12 2018 11:38AM  Addend:  REILLY BOURGEOIS M.D., ATTENDING RADIOLOGIST  This addendum was electronically signed on: Jan 12 2018 11:59AM.    < end of copied text >      CRITICAL CARE TIME SPENT: ***

## 2018-01-12 NOTE — PROGRESS NOTE ADULT - PROBLEM SELECTOR PLAN 2
IV Cardene for BP control  Will add IV hydralazine as needed to maintain SBP < 160  restart home meds  PT/OT/speech swallow BIU

## 2018-01-12 NOTE — SWALLOW BEDSIDE ASSESSMENT ADULT - SLP GENERAL OBSERVATIONS
Pt received & seen seated OOB in chair, +awake/alert, +language line ID: [id number] used for Haitian interpretation, +oriented to month

## 2018-01-12 NOTE — SWALLOW BEDSIDE ASSESSMENT ADULT - ORAL PHASE
Decreased anterior-posterior movement of the bolus/Delayed oral transit time/Lingual stasis Delayed oral transit time Within functional limits premature spillage suspected

## 2018-01-12 NOTE — PROVIDER CONTACT NOTE (EICU) - ASSESSMENT
no visual distress, admitted to chest pain as noted above. not diaphoretic, no change in rhythm, no hypertension.

## 2018-01-12 NOTE — PROGRESS NOTE ADULT - ASSESSMENT
A/P: 67y Male found passed out on the roof of a greenhouse, transferred from [facility] with R thalamic hemorrhage, SBP 170s-180s upon arrival to [facility]. Repeat CT head this AM stable. CT C-spine without acute findings  - D/w Dr. Barrios and Dr. Clarke  - Ok to d/c c-collar  - Final read on CTA/CTV pending  - BP control- SBP goal < 150  - Neurology consult  - No acute neurosurgical intervention warranted at this time  - Would continue to hold therapeutic anticoagulation/antiplatelet therapy. No contraindication to starting prophylactic lovenox for DVT ppx tonight  - Supportive care per MICU

## 2018-01-13 DIAGNOSIS — E11.9 TYPE 2 DIABETES MELLITUS WITHOUT COMPLICATIONS: ICD-10-CM

## 2018-01-13 LAB
ANION GAP SERPL CALC-SCNC: 12 MMOL/L — SIGNIFICANT CHANGE UP (ref 5–17)
BUN SERPL-MCNC: 30 MG/DL — HIGH (ref 8–20)
CALCIUM SERPL-MCNC: 9.4 MG/DL — SIGNIFICANT CHANGE UP (ref 8.6–10.2)
CHLORIDE SERPL-SCNC: 99 MMOL/L — SIGNIFICANT CHANGE UP (ref 98–107)
CO2 SERPL-SCNC: 26 MMOL/L — SIGNIFICANT CHANGE UP (ref 22–29)
CREAT SERPL-MCNC: 1.59 MG/DL — HIGH (ref 0.5–1.3)
GLUCOSE BLDC GLUCOMTR-MCNC: 198 MG/DL — HIGH (ref 70–99)
GLUCOSE BLDC GLUCOMTR-MCNC: 199 MG/DL — HIGH (ref 70–99)
GLUCOSE BLDC GLUCOMTR-MCNC: 248 MG/DL — HIGH (ref 70–99)
GLUCOSE BLDC GLUCOMTR-MCNC: 292 MG/DL — HIGH (ref 70–99)
GLUCOSE SERPL-MCNC: 293 MG/DL — HIGH (ref 70–115)
HBA1C BLD-MCNC: 10.9 % — HIGH (ref 4–5.6)
HCT VFR BLD CALC: 35.8 % — LOW (ref 42–52)
HGB BLD-MCNC: 12.1 G/DL — LOW (ref 14–18)
MAGNESIUM SERPL-MCNC: 2.3 MG/DL — SIGNIFICANT CHANGE UP (ref 1.6–2.6)
MCHC RBC-ENTMCNC: 28.3 PG — SIGNIFICANT CHANGE UP (ref 27–31)
MCHC RBC-ENTMCNC: 33.8 G/DL — SIGNIFICANT CHANGE UP (ref 32–36)
MCV RBC AUTO: 83.6 FL — SIGNIFICANT CHANGE UP (ref 80–94)
PHOSPHATE SERPL-MCNC: 3.4 MG/DL — SIGNIFICANT CHANGE UP (ref 2.4–4.7)
PLATELET # BLD AUTO: 250 K/UL — SIGNIFICANT CHANGE UP (ref 150–400)
POTASSIUM SERPL-MCNC: 3.6 MMOL/L — SIGNIFICANT CHANGE UP (ref 3.5–5.3)
POTASSIUM SERPL-SCNC: 3.6 MMOL/L — SIGNIFICANT CHANGE UP (ref 3.5–5.3)
RBC # BLD: 4.28 M/UL — LOW (ref 4.6–6.2)
RBC # FLD: 13.1 % — SIGNIFICANT CHANGE UP (ref 11–15.6)
SODIUM SERPL-SCNC: 137 MMOL/L — SIGNIFICANT CHANGE UP (ref 135–145)
TROPONIN T SERPL-MCNC: 0.01 NG/ML — SIGNIFICANT CHANGE UP (ref 0–0.06)
WBC # BLD: 7.5 K/UL — SIGNIFICANT CHANGE UP (ref 4.8–10.8)
WBC # FLD AUTO: 7.5 K/UL — SIGNIFICANT CHANGE UP (ref 4.8–10.8)

## 2018-01-13 PROCEDURE — 99233 SBSQ HOSP IP/OBS HIGH 50: CPT

## 2018-01-13 PROCEDURE — 93010 ELECTROCARDIOGRAM REPORT: CPT

## 2018-01-13 PROCEDURE — 93306 TTE W/DOPPLER COMPLETE: CPT | Mod: 26

## 2018-01-13 PROCEDURE — 99222 1ST HOSP IP/OBS MODERATE 55: CPT

## 2018-01-13 RX ORDER — LABETALOL HCL 100 MG
200 TABLET ORAL THREE TIMES A DAY
Refills: 0 | Status: DISCONTINUED | OUTPATIENT
Start: 2018-01-13 | End: 2018-01-14

## 2018-01-13 RX ORDER — MORPHINE SULFATE 50 MG/1
2 CAPSULE, EXTENDED RELEASE ORAL EVERY 4 HOURS
Refills: 0 | Status: DISCONTINUED | OUTPATIENT
Start: 2018-01-13 | End: 2018-01-18

## 2018-01-13 RX ORDER — LABETALOL HCL 100 MG
20 TABLET ORAL
Refills: 0 | Status: DISCONTINUED | OUTPATIENT
Start: 2018-01-13 | End: 2018-01-13

## 2018-01-13 RX ORDER — SODIUM CHLORIDE 9 MG/ML
1000 INJECTION, SOLUTION INTRAVENOUS
Refills: 0 | Status: DISCONTINUED | OUTPATIENT
Start: 2018-01-13 | End: 2018-01-15

## 2018-01-13 RX ORDER — INSULIN GLARGINE 100 [IU]/ML
10 INJECTION, SOLUTION SUBCUTANEOUS AT BEDTIME
Refills: 0 | Status: DISCONTINUED | OUTPATIENT
Start: 2018-01-13 | End: 2018-01-14

## 2018-01-13 RX ORDER — PANTOPRAZOLE SODIUM 20 MG/1
40 TABLET, DELAYED RELEASE ORAL
Refills: 0 | Status: DISCONTINUED | OUTPATIENT
Start: 2018-01-13 | End: 2018-01-26

## 2018-01-13 RX ORDER — POTASSIUM CHLORIDE 20 MEQ
20 PACKET (EA) ORAL ONCE
Refills: 0 | Status: COMPLETED | OUTPATIENT
Start: 2018-01-13 | End: 2018-01-13

## 2018-01-13 RX ORDER — HYDRALAZINE HCL 50 MG
10 TABLET ORAL EVERY 4 HOURS
Refills: 0 | Status: DISCONTINUED | OUTPATIENT
Start: 2018-01-13 | End: 2018-01-26

## 2018-01-13 RX ADMIN — INSULIN GLARGINE 10 UNIT(S): 100 INJECTION, SOLUTION SUBCUTANEOUS at 22:30

## 2018-01-13 RX ADMIN — Medication 1: at 11:40

## 2018-01-13 RX ADMIN — HYDROMORPHONE HYDROCHLORIDE 0.5 MILLIGRAM(S): 2 INJECTION INTRAMUSCULAR; INTRAVENOUS; SUBCUTANEOUS at 04:10

## 2018-01-13 RX ADMIN — MORPHINE SULFATE 2 MILLIGRAM(S): 50 CAPSULE, EXTENDED RELEASE ORAL at 17:16

## 2018-01-13 RX ADMIN — Medication 200 MILLIGRAM(S): at 15:21

## 2018-01-13 RX ADMIN — HYDROMORPHONE HYDROCHLORIDE 0.5 MILLIGRAM(S): 2 INJECTION INTRAMUSCULAR; INTRAVENOUS; SUBCUTANEOUS at 04:25

## 2018-01-13 RX ADMIN — Medication 10 MILLIGRAM(S): at 05:28

## 2018-01-13 RX ADMIN — MORPHINE SULFATE 2 MILLIGRAM(S): 50 CAPSULE, EXTENDED RELEASE ORAL at 17:36

## 2018-01-13 RX ADMIN — Medication 1: at 17:34

## 2018-01-13 RX ADMIN — Medication 3: at 06:44

## 2018-01-13 RX ADMIN — AMLODIPINE BESYLATE 10 MILLIGRAM(S): 2.5 TABLET ORAL at 05:28

## 2018-01-13 RX ADMIN — Medication 100 MILLIGRAM(S): at 05:28

## 2018-01-13 RX ADMIN — Medication 20 MILLIEQUIVALENT(S): at 11:50

## 2018-01-13 RX ADMIN — Medication 200 MILLIGRAM(S): at 21:11

## 2018-01-13 RX ADMIN — Medication 10 MILLIGRAM(S): at 17:16

## 2018-01-13 NOTE — DIETITIAN INITIAL EVALUATION ADULT. - OTHER INFO
Pt s/p CVA, intercranial hemorrhage. Per SLP, Pt now on mechanical soft, nectar diet - intake good ~75% PO. No complaints at this time. RD to follow up with diet education when Pt more alert and oriented.

## 2018-01-13 NOTE — PROGRESS NOTE ADULT - SUBJECTIVE AND OBJECTIVE BOX
Internal Medicine Hospitalist - Dr. Karimi - Hospitalist Accepting Note     COLTEN MOYER    [id number]    67y      Male    Patient is a 67y old  Male who presents with a chief complaint of stroke (11 Jan 2018 21:41)    INTERVAL HPI/ OVERNIGHT EVENTS: Patient is seen and examined, pt is awake, report mild headache, denied chest pain, SOB, dizziness.     REVIEW OF SYSTEMS:    Denied fever, chills, abd. pain, nausea, vomiting, chest pain, SOB, headache, dizziness    PHYSICAL EXAM:    Vital Signs Last 24 Hrs  T(C): 36.9 (13 Jan 2018 07:05), Max: 37.2 (13 Jan 2018 03:00)  T(F): 98.4 (13 Jan 2018 07:05), Max: 99 (13 Jan 2018 03:00)  HR: 61 (13 Jan 2018 11:00) (51 - 85)  BP: 164/72 (13 Jan 2018 11:00) (118/67 - 195/94)  BP(mean): 103 (13 Jan 2018 11:00) (84 - 135)  RR: 18 (13 Jan 2018 11:00) (10 - 40)  SpO2: 97% (13 Jan 2018 11:00) (94% - 100%)    GENERAL: NAD  HEENT: EOMI  Neck: supple  CHEST/LUNG: positive air entry   HEART: S1S2+ audible  ABDOMEN: Soft, Nontender, Nondistended; Bowel sounds present  EXTREMITIES:  no edema  CNS: AAO X 3, no aphasia, left sided hemiplegia    LABS:                        12.1   7.5   )-----------( 250      ( 13 Jan 2018 05:47 )             35.8     01-13    137  |  99  |  30.0<H>  ----------------------------<  293<H>  3.6   |  26.0  |  1.59<H>    Ca    9.4      13 Jan 2018 05:47  Phos  3.4     01-13  Mg     2.3     01-13    TPro  8.2  /  Alb  3.9  /  TBili  0.6  /  DBili  x   /  AST  22  /  ALT  20  /  AlkPhos  153<H>  01-11    PT/INR - ( 11 Jan 2018 15:22 )   PT: 10.8 sec;   INR: 0.99 ratio         PTT - ( 11 Jan 2018 15:22 )  PTT:33.4 sec        MEDICATIONS  (STANDING):  amLODIPine   Tablet 10 milliGRAM(s) Oral daily  dextrose 5%. 1000 milliLiter(s) (50 mL/Hr) IV Continuous <Continuous>  dextrose 50% Injectable 12.5 Gram(s) IV Push once  dextrose 50% Injectable 25 Gram(s) IV Push once  dextrose 50% Injectable 25 Gram(s) IV Push once  enalapril 10 milliGRAM(s) Oral two times a day  influenza   Vaccine 0.5 milliLiter(s) IntraMuscular once  insulin lispro (HumaLOG) corrective regimen sliding scale   SubCutaneous three times a day before meals  insulin lispro (HumaLOG) corrective regimen sliding scale   SubCutaneous at bedtime  labetalol 200 milliGRAM(s) Oral three times a day  sodium chloride 0.45%. 1000 milliLiter(s) (75 mL/Hr) IV Continuous <Continuous>    MEDICATIONS  (PRN):  dextrose Gel 1 Dose(s) Oral once PRN Blood Glucose LESS THAN 70 milliGRAM(s)/deciliter  glucagon  Injectable 1 milliGRAM(s) IntraMuscular once PRN Glucose LESS THAN 70 milligrams/deciliter  hydrALAZINE Injectable 10 milliGRAM(s) IV Push every 4 hours PRN sbp>160  HYDROmorphone  Injectable 0.5 milliGRAM(s) IV Push every 2 hours PRN Moderate Pain (4 - 6)      RADIOLOGY & ADDITIONAL TEST    < from: CT Head No Cont (01.12.18 @ 08:50) >    Impression: No change right thalamic intracerebral hematoma since the   prior day. No evidence of midline shift on the current study. Mild   persistent surrounding edema and mass effect in the right lateral   ventricle.    < end of copied text >

## 2018-01-13 NOTE — PROGRESS NOTE ADULT - PROBLEM SELECTOR PLAN 1
Likely related to uncontrolled HTN  NSG signed off neurology consulted  downgrade to q4 hr neuro cks

## 2018-01-13 NOTE — PROGRESS NOTE ADULT - ASSESSMENT
67 yom with HTN ICH, DM, left side weakness    Stable for transfer to the floor 67 yom with HTN ICH, DM, left side weakness    Stable for transfer to the floor  Updated daughter via phone interpretor

## 2018-01-13 NOTE — CONSULT NOTE ADULT - SUBJECTIVE AND OBJECTIVE BOX
[facility]                                        Neurology at [place]                                  Ezekiel Sepulveda, & Brian                                      [address]                                           [place], [place], [address]                                                [phone]    HISTORY:    The patient is a 67y Male who developed dizziness, confusion, and left sided difficulty. He was taken to [facility] and CT showed intracranial hemorrhage and he was transferred here.     He was seen by neurosurgery and felt not to need surgical intervention.     PAST MEDICAL & SURGICAL HISTORY:  No pertinent past medical history  HTN (hypertension)  CVA (cerebral vascular accident)  Diabetes  No significant past surgical history  No significant past surgical history      MEDICATION PRIOR TO ADMISSION:  Meds for blood pressure and Diabetes Mellitus but does not recall names.     MEDICATIONS  (STANDING):  amLODIPine   Tablet 10 milliGRAM(s) Oral daily  dextrose 5%. 1000 milliLiter(s) (50 mL/Hr) IV Continuous <Continuous>  dextrose 50% Injectable 12.5 Gram(s) IV Push once  dextrose 50% Injectable 25 Gram(s) IV Push once  dextrose 50% Injectable 25 Gram(s) IV Push once  enalapril 10 milliGRAM(s) Oral two times a day  influenza   Vaccine 0.5 milliLiter(s) IntraMuscular once  insulin lispro (HumaLOG) corrective regimen sliding scale   SubCutaneous three times a day before meals  insulin lispro (HumaLOG) corrective regimen sliding scale   SubCutaneous at bedtime  labetalol 200 milliGRAM(s) Oral three times a day  potassium chloride    Tablet ER 20 milliEquivalent(s) Oral once    MEDICATIONS  (PRN):  dextrose Gel 1 Dose(s) Oral once PRN Blood Glucose LESS THAN 70 milliGRAM(s)/deciliter  glucagon  Injectable 1 milliGRAM(s) IntraMuscular once PRN Glucose LESS THAN 70 milligrams/deciliter  HYDROmorphone  Injectable 0.5 milliGRAM(s) IV Push every 2 hours PRN Moderate Pain (4 - 6)  labetalol Injectable 10 milliGRAM(s) IV Push every 1 hour PRN SBP >160      Allergies  No Known Allergies    SOCIAL HISTORY:  Non smoker.   From [facility].     FAMILY HISTORY:      ROS:  The patient denies fevers or weight changes.  Denies headache.  Denies chest pain.  Denies shortness of breath.  Denies abdominal pain, nausea, or vomiting.  Denies change in urinary pattern.  Denies rash.  Denies recent mood changes.    Exam:  Vital Signs Last 24 Hrs  T(F): 98.4 (13 Jan 2018 07:05), Max: 99 (13 Jan 2018 03:00)  HR: 59 (13 Jan 2018 09:00) (51 - 85)  BP: 118/67 (13 Jan 2018 09:00) (118/67 - 195/94)  BP(mean): 87 (13 Jan 2018 09:00) (84 - 138)  RR: 16 (13 Jan 2018 09:00) (10 - 38)  SpO2: 98% (13 Jan 2018 09:00) (94% - 100%)  General: NAD.   Carotid bruits absent.     Mental status: The patient is awake, alert, and fully oriented. There is no aphasia. There is some left sided neglect.     Cranial nerves: There is no papilledema. Pupils react Symmetrically to light. There is no visual field deficit to confrontation. Extraocular motion is full with no nystagmus. There is no ptosis. Facial sensation is intact. Facial musculature is asymmetric with a depression of the left nasolabial fold. Palate elevates symmetrically. Tongue is midline.    Motor: There is normal bulk and tone.  Strength is 5/5 in the right arm and leg.   Strength is 0/5 in the left arm and leg proximally and 1-2/5 distally.    Sensation: Decreased to all modalities on the left.     Reflexes: 1+ throughout and plantar responses are flexor.    Cerebellar: There is no dysmetria on finger to nose testing.    LABS:                         12.1   7.5   )-----------( 250      ( 13 Jan 2018 05:47 )             35.8       01-13    137  |  99  |  30.0<H>  ----------------------------<  293<H>  3.6   |  26.0  |  1.59<H>    Ca    9.4      13 Jan 2018 05:47  Phos  3.4     01-13  Mg     2.3     01-13    TPro  8.2  /  Alb  3.9  /  TBili  0.6  /  DBili  x   /  AST  22  /  ALT  20  /  AlkPhos  153<H>  01-11      PT/INR - ( 11 Jan 2018 15:22 )   PT: 10.8 sec;   INR: 0.99 ratio    PTT - ( 11 Jan 2018 15:22 )  PTT:33.4 sec    RADIOLOGY & ADDITIONAL STUDIES:  CT head shows right thalamic intracranial hemorrhage with some extension to ventricle.

## 2018-01-13 NOTE — PROGRESS NOTE ADULT - ASSESSMENT
This is 66M with PMHX of HTN transfer from [facility], presented with AMS. Head Ct showed a Right Thalamic Hemorrhagic Bleed with some Effacement of the Lateral Ventricle and surrounding Edema.  He was brought to [facility] for further management and Neurosurgical evaluation. On admission BP was 170 and he was placed on a Cardene Infusion and admitted to ICU. He was seen by NS, repeat CT head No change right thalamic intracerebral hematoma since the   prior day. No evidence of midline shift on the current study. Mild persistent surrounding edema and mass effect in the right lateral ventricle, per NS - No acute neurosurgical intervention warranted at this time. Pt is downgraded to hospitalist office.     A/P    >ICH with left sided hemiplegia due to uncontrolled HTN   appreciate NS input - No acute neurosurgical intervention warranted at this time  repeat CT head stable   downgraded to tele with neuroc check q4h   Neurology consult appreciated   control BP  PPI  PT/OT, Rehab     >HTN emergency - improving   c/w labetalol 200 TID, amlodipine 10, enalapril 10 bid  hydralazine prn   TTE     >DM - A1c 10.9   , start lantus 10 units plus sliding scale     >ALIVIA - due to poor oral intake  gentle hydration, f/u BMP     >DVT PPX - SCD This is 66M with PMHX of HTN transfer from [facility], presented with AMS. Head Ct showed a Right Thalamic Hemorrhagic Bleed with some Effacement of the Lateral Ventricle and surrounding Edema.  He was brought to [facility] for further management and Neurosurgical evaluation. On admission BP was 170 and he was placed on a Cardene Infusion and admitted to ICU. He was seen by NS, repeat CT head No change right thalamic intracerebral hematoma since the   prior day. No evidence of midline shift on the current study. Mild persistent surrounding edema and mass effect in the right lateral ventricle, per NS - No acute neurosurgical intervention warranted at this time. Pt is downgraded to hospitalist office.     A/P    >ICH with left sided hemiplegia due to uncontrolled HTN   appreciate NS input - No acute neurosurgical intervention warranted at this time  repeat CT head stable   downgraded to tele with neuroc check q4h   Neurology consult appreciated   appreciate speech eval - on dysphagia diet   control BP  PPI  PT/OT, Rehab     >HTN emergency - improving   c/w labetalol 200 TID, amlodipine 10, enalapril 10 bid  hydralazine prn   TTE     >DM - A1c 10.9   , start lantus 10 units plus sliding scale     >ALIVIA - due to poor oral intake  gentle hydration, f/u BMP     >DVT PPX - SCD

## 2018-01-13 NOTE — PROGRESS NOTE ADULT - SUBJECTIVE AND OBJECTIVE BOX
Patient is a 67y old  Male who presents with a chief complaint of stroke (11 Jan 2018 21:41)      BRIEF HOSPITAL COURSE: 67 yom transferred from [place] after having left sided weakness found to have ICH likely hypertensive    Events last 24 hours: no acute issues    PAST MEDICAL & SURGICAL HISTORY:  No pertinent past medical history  HTN (hypertension)  CVA (cerebral vascular accident)  Diabetes  No significant past surgical history  No significant past surgical history      Medications:    amLODIPine   Tablet 10 milliGRAM(s) Oral daily  enalapril 10 milliGRAM(s) Oral two times a day  labetalol 200 milliGRAM(s) Oral three times a day  labetalol Injectable 10 milliGRAM(s) IV Push every 1 hour PRN      HYDROmorphone  Injectable 0.5 milliGRAM(s) IV Push every 2 hours PRN    dextrose 50% Injectable 12.5 Gram(s) IV Push once  dextrose 50% Injectable 25 Gram(s) IV Push once  dextrose 50% Injectable 25 Gram(s) IV Push once  dextrose Gel 1 Dose(s) Oral once PRN  glucagon  Injectable 1 milliGRAM(s) IntraMuscular once PRN  insulin lispro (HumaLOG) corrective regimen sliding scale   SubCutaneous three times a day before meals  insulin lispro (HumaLOG) corrective regimen sliding scale   SubCutaneous at bedtime    dextrose 5%. 1000 milliLiter(s) IV Continuous <Continuous>  potassium chloride    Tablet ER 20 milliEquivalent(s) Oral once    influenza   Vaccine 0.5 milliLiter(s) IntraMuscular once      ICU Vital Signs Last 24 Hrs  T(C): 36.9 (13 Jan 2018 07:05), Max: 37.2 (13 Jan 2018 03:00)  T(F): 98.4 (13 Jan 2018 07:05), Max: 99 (13 Jan 2018 03:00)  HR: 61 (13 Jan 2018 11:00) (51 - 85)  BP: 164/72 (13 Jan 2018 11:00) (118/67 - 195/94)  BP(mean): 103 (13 Jan 2018 11:00) (84 - 138)  ABP: --  ABP(mean): --  RR: 18 (13 Jan 2018 11:00) (10 - 40)  SpO2: 97% (13 Jan 2018 11:00) (94% - 100%)          I&O's Detail    12 Jan 2018 07:01  -  13 Jan 2018 07:00  --------------------------------------------------------  IN:    IV PiggyBack: 100 mL    Oral Fluid: 240 mL  Total IN: 340 mL    OUT:    Voided: 925 mL  Total OUT: 925 mL    Total NET: -585 mL      13 Jan 2018 07:01  -  13 Jan 2018 11:49  --------------------------------------------------------  IN:    Oral Fluid: 100 mL  Total IN: 100 mL    OUT:    Voided: 200 mL  Total OUT: 200 mL    Total NET: -100 mL            LABS:                        12.1   7.5   )-----------( 250      ( 13 Jan 2018 05:47 )             35.8     01-13    137  |  99  |  30.0<H>  ----------------------------<  293<H>  3.6   |  26.0  |  1.59<H>    Ca    9.4      13 Jan 2018 05:47  Phos  3.4     01-13  Mg     2.3     01-13    TPro  8.2  /  Alb  3.9  /  TBili  0.6  /  DBili  x   /  AST  22  /  ALT  20  /  AlkPhos  153<H>  01-11      CARDIAC MARKERS ( 12 Jan 2018 20:08 )  x     / <0.01 ng/mL / x     / x     / x      CARDIAC MARKERS ( 11 Jan 2018 15:22 )  <.015 ng/mL / x     / 121 U/L / x     / 2.1 ng/mL      CAPILLARY BLOOD GLUCOSE      POCT Blood Glucose.: 292 mg/dL (13 Jan 2018 06:42)    PT/INR - ( 11 Jan 2018 15:22 )   PT: 10.8 sec;   INR: 0.99 ratio         PTT - ( 11 Jan 2018 15:22 )  PTT:33.4 sec    CULTURES:      Physical Examination:    General: No acute distress.  Alert, oriented, interactive, nonfocal    HEENT: Pupils equal, reactive to light.  Symmetric.    PULM: Clear to auscultation bilaterally, no significant sputum production    CVS: Regular rate and rhythm, no murmurs, rubs, or gallops    ABD: Soft, nondistended, nontender, normoactive bowel sounds, no masses    EXT: No edema, nontender    SKIN: Warm and well perfused, no rashes noted.    Neuro: left sided weakness, mild dysartheria, right side intac

## 2018-01-13 NOTE — CONSULT NOTE ADULT - SUBJECTIVE AND OBJECTIVE BOX
Patient is a 67y old  Male who presents with right thalamic ICH      HPI:  65 yo ___ M  with PMH of HTN  (uncontrolled, does not regularly follow with PCP) presented to [facility] after possible fall from ladder with ?LOC vs found on roof with AMS, imaging showed a right thalamic ICH and transferred to [facility] on 1/11/18 for further management.     On admission to [facility], -180s and started on cardene drip, repeat imaging showed stable right thalamus and corona radiata ICH with mass effect and mild leftward midline shift and small IVH.  CTA was negative for significant stenosis, aneurysm/vascular abnormality or proximal intraluminal filling defect involving the anterior circulation; limited intraluminal contrast within the right V3 segment and tracing intraluminal contrast within the right V4 segment-- question acute or chronic stenosis/dissection, presence of intraluminal thrombus not excluded. CTV negative for venous sinus thrombosis.  --> no acute neurosurgical intervention and BP control per nsg.     CT C-spine negative for fracture;  SLP eval recommend mechanical soft with nectars    REVIEW OF SYSTEMS  Constitutional - No fever, No weight loss, No fatigue  HEENT - No eye pain, No visual disturbances, No difficulty hearing, No tinnitus, No vertigo, No neck pain  Respiratory - No cough, No wheezing, No shortness of breath  Cardiovascular - No chest pain, No palpitations  Gastrointestinal - No abdominal pain, No nausea, No vomiting, No diarrhea, No constipation  Genitourinary - No dysuria, No frequency, No hematuria, No incontinence  Neurological - No headaches, No memory loss, No loss of strength, No numbness, No tremors  Skin - No itching, No rashes, No lesions   Endocrine - No temperature intolerance  Musculoskeletal - No joint pain, No joint swelling, No muscle pain  Psychiatric - No depression, No anxiety    PAST MEDICAL & SURGICAL HISTORY    HTN (hypertension)  CVA (cerebral vascular accident)  Diabetes  No significant past surgical history  No significant past surgical history      SOCIAL HISTORY  Smoking - Denied  EtOH - Denied   Drugs - Denied    FUNCTIONAL HISTORY  Lives   Independent    CURRENT FUNCTIONAL STATUS      FAMILY HISTORY       RECENT LABS/IMAGING  CBC Full  -  ( 13 Jan 2018 05:47 )  WBC Count : 7.5 K/uL  Hemoglobin : 12.1 g/dL  Hematocrit : 35.8 %  Platelet Count - Automated : 250 K/uL  Mean Cell Volume : 83.6 fl  Mean Cell Hemoglobin : 28.3 pg  Mean Cell Hemoglobin Concentration : 33.8 g/dL  Auto Neutrophil # : x  Auto Lymphocyte # : x  Auto Monocyte # : x  Auto Eosinophil # : x  Auto Basophil # : x  Auto Neutrophil % : x  Auto Lymphocyte % : x  Auto Monocyte % : x  Auto Eosinophil % : x  Auto Basophil % : x    01-13    137  |  99  |  30.0<H>  ----------------------------<  293<H>  3.6   |  26.0  |  1.59<H>    Ca    9.4      13 Jan 2018 05:47  Phos  3.4     01-13  Mg     2.3     01-13          VITALS  T(C): 36.9 (01-13-18 @ 07:05), Max: 37.2 (01-13-18 @ 03:00)  HR: 76 (01-13-18 @ 21:03) (55 - 85)  BP: 160/80 (01-13-18 @ 21:03) (118/67 - 181/86)  RR: 18 (01-13-18 @ 21:03) (10 - 40)  SpO2: 98% (01-13-18 @ 21:03) (95% - 99%)  Wt(kg): --    ALLERGIES  No Known Allergies      MEDICATIONS   aluminum hydroxide/magnesium hydroxide/simethicone Suspension 30 milliLiter(s) Oral every 4 hours PRN  amLODIPine   Tablet 10 milliGRAM(s) Oral daily  dextrose 5%. 1000 milliLiter(s) IV Continuous <Continuous>  dextrose 50% Injectable 12.5 Gram(s) IV Push once  dextrose 50% Injectable 25 Gram(s) IV Push once  dextrose 50% Injectable 25 Gram(s) IV Push once  dextrose Gel 1 Dose(s) Oral once PRN  enalapril 10 milliGRAM(s) Oral two times a day  glucagon  Injectable 1 milliGRAM(s) IntraMuscular once PRN  hydrALAZINE Injectable 10 milliGRAM(s) IV Push every 4 hours PRN  influenza   Vaccine 0.5 milliLiter(s) IntraMuscular once  insulin glargine Injectable (LANTUS) 10 Unit(s) SubCutaneous at bedtime  insulin lispro (HumaLOG) corrective regimen sliding scale   SubCutaneous three times a day before meals  insulin lispro (HumaLOG) corrective regimen sliding scale   SubCutaneous at bedtime  labetalol 200 milliGRAM(s) Oral three times a day  morphine  - Injectable 2 milliGRAM(s) IV Push every 4 hours PRN  pantoprazole    Tablet 40 milliGRAM(s) Oral before breakfast  sodium chloride 0.45%. 1000 milliLiter(s) IV Continuous <Continuous> Patient is a 67y old  Male who presents with right thalamic ICH      HPI:  67 yo ___ M  with PMH of HTN  (uncontrolled, does not regularly follow with PCP) presented to [facility] with unclear history -- possible fall from ladder with ?LOC vs found on roof with AMS, imaging showed a right thalamic ICH and transferred to [facility] on 1/11/18 for further management.     On admission to [facility], -180s and started on cardene drip, repeat imaging showed stable right thalamus and corona radiata ICH with mass effect and mild leftward midline shift and small IVH thought to be hypertensive bleed.  CTA was negative for significant stenosis, aneurysm/vascular abnormality or proximal intraluminal filling defect involving the anterior circulation; limited intraluminal contrast within the right V3 segment and tracing intraluminal contrast within the right V4 segment-- question acute or chronic stenosis/dissection, presence of intraluminal thrombus not excluded. CTV negative for venous sinus thrombosis.  --> no acute neurosurgical intervention and BP control per nsg.     CT C-spine negative for fracture;  SLP eval recommend mechanical soft with nectars    REVIEW OF SYSTEMS  Constitutional - No fever, No weight loss, No fatigue  HEENT - No eye pain, No visual disturbances, No difficulty hearing, No tinnitus, No vertigo, No neck pain  Respiratory - No cough, No wheezing, No shortness of breath  Cardiovascular - No chest pain, No palpitations  Gastrointestinal - No abdominal pain, No nausea, No vomiting, No diarrhea, No constipation  Genitourinary - No dysuria, No frequency, No hematuria, No incontinence  Neurological - No headaches, No memory loss, No loss of strength, No numbness, No tremors  Skin - No itching, No rashes, No lesions   Endocrine - No temperature intolerance  Musculoskeletal - No joint pain, No joint swelling, No muscle pain  Psychiatric - No depression, No anxiety    PAST MEDICAL & SURGICAL HISTORY    HTN (hypertension)  CVA (cerebral vascular accident)  Diabetes  No significant past surgical history  No significant past surgical history      SOCIAL HISTORY  Smoking - Denied  EtOH - Denied   Drugs - Denied    FUNCTIONAL HISTORY  Lives   Independent    CURRENT FUNCTIONAL STATUS      FAMILY HISTORY       RECENT LABS/IMAGING  CBC Full  -  ( 13 Jan 2018 05:47 )  WBC Count : 7.5 K/uL  Hemoglobin : 12.1 g/dL  Hematocrit : 35.8 %  Platelet Count - Automated : 250 K/uL  Mean Cell Volume : 83.6 fl  Mean Cell Hemoglobin : 28.3 pg  Mean Cell Hemoglobin Concentration : 33.8 g/dL  Auto Neutrophil # : x  Auto Lymphocyte # : x  Auto Monocyte # : x  Auto Eosinophil # : x  Auto Basophil # : x  Auto Neutrophil % : x  Auto Lymphocyte % : x  Auto Monocyte % : x  Auto Eosinophil % : x  Auto Basophil % : x    01-13    137  |  99  |  30.0<H>  ----------------------------<  293<H>  3.6   |  26.0  |  1.59<H>    Ca    9.4      13 Jan 2018 05:47  Phos  3.4     01-13  Mg     2.3     01-13          VITALS  T(C): 36.9 (01-13-18 @ 07:05), Max: 37.2 (01-13-18 @ 03:00)  HR: 76 (01-13-18 @ 21:03) (55 - 85)  BP: 160/80 (01-13-18 @ 21:03) (118/67 - 181/86)  RR: 18 (01-13-18 @ 21:03) (10 - 40)  SpO2: 98% (01-13-18 @ 21:03) (95% - 99%)  Wt(kg): --    ALLERGIES  No Known Allergies      MEDICATIONS   aluminum hydroxide/magnesium hydroxide/simethicone Suspension 30 milliLiter(s) Oral every 4 hours PRN  amLODIPine   Tablet 10 milliGRAM(s) Oral daily  dextrose 5%. 1000 milliLiter(s) IV Continuous <Continuous>  dextrose 50% Injectable 12.5 Gram(s) IV Push once  dextrose 50% Injectable 25 Gram(s) IV Push once  dextrose 50% Injectable 25 Gram(s) IV Push once  dextrose Gel 1 Dose(s) Oral once PRN  enalapril 10 milliGRAM(s) Oral two times a day  glucagon  Injectable 1 milliGRAM(s) IntraMuscular once PRN  hydrALAZINE Injectable 10 milliGRAM(s) IV Push every 4 hours PRN  influenza   Vaccine 0.5 milliLiter(s) IntraMuscular once  insulin glargine Injectable (LANTUS) 10 Unit(s) SubCutaneous at bedtime  insulin lispro (HumaLOG) corrective regimen sliding scale   SubCutaneous three times a day before meals  insulin lispro (HumaLOG) corrective regimen sliding scale   SubCutaneous at bedtime  labetalol 200 milliGRAM(s) Oral three times a day  morphine  - Injectable 2 milliGRAM(s) IV Push every 4 hours PRN  pantoprazole    Tablet 40 milliGRAM(s) Oral before breakfast  sodium chloride 0.45%. 1000 milliLiter(s) IV Continuous <Continuous> Patient is a 67y old  Male who presents with right thalamic ICH      HPI:  68 yo RHD M  with PMH of HTN  (uncontrolled, does not regularly follow with PCP) presented to [facility] after fall while on roof at work, imaging showed a right thalamic ICH and transferred to [facility] on 1/11/18 for further management.     On admission to [facility], -180s and started on cardene drip, repeat imaging showed stable right thalamus and corona radiata ICH with mass effect and mild leftward midline shift and small IVH thought to be hypertensive bleed.  CTA was negative for significant stenosis, aneurysm/vascular abnormality or proximal intraluminal filling defect involving the anterior circulation; limited intraluminal contrast within the right V3 segment and tracing intraluminal contrast within the right V4 segment-- question acute or chronic stenosis/dissection, presence of intraluminal thrombus not excluded. CTV negative for venous sinus thrombosis.  --> no acute neurosurgical intervention and BP control per nsg.     CT C-spine negative for fracture;  SLP eval recommend mechanical soft with nectars.    On exam, patient reports neck pain and left sided weakness, occasional chest pain with deep breathing and constipation.     REVIEW OF SYSTEMS  Constitutional - + fatigue  HEENT - No eye pain, No difficulty hearing, No vertigo, +neck pain  Respiratory - No cough, No shortness of breath  Cardiovascular - CP with deep breathing  Gastrointestinal - No abdominal pain, No nausea, No vomiting, +constipation (a 5 days)  Genitourinary - No dysuria, No incontinence  Neurological - No headaches, No memory loss, left sided weakness and numbness  Skin - No itching, No rashes, No lesions   Endocrine - No temperature intolerance  Musculoskeletal - No joint pain, No joint swelling, No muscle pain  Psychiatric - No depression, No anxiety    PAST MEDICAL & SURGICAL HISTORY    HTN (hypertension)  CVA (cerebral vascular accident)  Diabetes  No significant past surgical history  No significant past surgical history      SOCIAL HISTORY  Smoking - Denied  EtOH - Denied   Drugs - Denied    FUNCTIONAL HISTORY  Lives alone in Room of house with 11 ANILA (1 HR)  Independent prior to admission, works in [employer]    CURRENT FUNCTIONAL STATUS  PT/OT eval pending    FAMILY HISTORY   Non-contributory    RECENT LABS/IMAGING  CBC Full  -  ( 13 Jan 2018 05:47 )  WBC Count : 7.5 K/uL  Hemoglobin : 12.1 g/dL  Hematocrit : 35.8 %  Platelet Count - Automated : 250 K/uL      01-13    137  |  99  |  30.0<H>  ----------------------------<  293<H>  3.6   |  26.0  |  1.59<H>    Ca    9.4      13 Jan 2018 05:47  Phos  3.4     01-13  Mg     2.3     01-13    Vital Signs Last 24 Hrs  T(C): 36.7 (14 Jan 2018 10:19), Max: 36.7 (14 Jan 2018 05:14)  T(F): 98.1 (14 Jan 2018 10:19), Max: 98.1 (14 Jan 2018 10:19)  HR: 71 (14 Jan 2018 10:19) (58 - 76)  BP: 144/68 (14 Jan 2018 10:19) (144/68 - 164/76)  BP(mean): 107 (13 Jan 2018 12:00) (107 - 107)  RR: 20 (14 Jan 2018 10:19) (18 - 23)  SpO2: 98% (14 Jan 2018 05:14) (98% - 98%)    ALLERGIES  No Known Allergies      MEDICATIONS   aluminum hydroxide/magnesium hydroxide/simethicone Suspension 30 milliLiter(s) Oral every 4 hours PRN  amLODIPine   Tablet 10 milliGRAM(s) Oral daily  dextrose 5%. 1000 milliLiter(s) IV Continuous <Continuous>  dextrose 50% Injectable 12.5 Gram(s) IV Push once  dextrose 50% Injectable 25 Gram(s) IV Push once  dextrose 50% Injectable 25 Gram(s) IV Push once  dextrose Gel 1 Dose(s) Oral once PRN  enalapril 10 milliGRAM(s) Oral two times a day  glucagon  Injectable 1 milliGRAM(s) IntraMuscular once PRN  hydrALAZINE Injectable 10 milliGRAM(s) IV Push every 4 hours PRN  influenza   Vaccine 0.5 milliLiter(s) IntraMuscular once  insulin glargine Injectable (LANTUS) 10 Unit(s) SubCutaneous at bedtime  insulin lispro (HumaLOG) corrective regimen sliding scale   SubCutaneous three times a day before meals  insulin lispro (HumaLOG) corrective regimen sliding scale   SubCutaneous at bedtime  labetalol 200 milliGRAM(s) Oral three times a day  morphine  - Injectable 2 milliGRAM(s) IV Push every 4 hours PRN  pantoprazole    Tablet 40 milliGRAM(s) Oral before breakfast  sodium chloride 0.45%. 1000 milliLiter(s) IV Continuous <Continuous>    ----------------------------------------------------------------------------------------  PHYSICAL EXAM  Constitutional - NAD, Comfortable  HEENT - NCAT, EOMI  Chest - No distress, symmetrical chest expansion  Cardiovascular - RRR, S1S2  Abdomen - Soft, NTND  Extremities - No C/C/E, No calf tenderness   Neurologic Exam -                    Cognitive - Awake, Alert, AAO to self, place, January 1998, situation     Communication - +dysarthria     Cranial Nerves -loss left nasolabial fold     Motor                     LEFT    UE - ShAB 0/5, EF 0/5, EE 0/5, WE 0/5,  1/5                    RIGHT UE - ShAB 5/5, EF 5/5, EE 5/5, WE 5/5,  5/5                    LEFT    LE - HF 1/5, KE 2/5, DF 0/5, PF 0/5                    RIGHT LE - HF 5/5, KE 5/5, DF 5/5, PF 5/5        Sensory - decreased sensation left UE and LE diffuselu     Reflexes - DTR Intact, No primitive reflexive     Psychiatric - Flat Affect   ----------------------------------------------------------------------------------------  ASSESSMENT/PLAN- 67M admitted with right thalamic and corona radiata ICH with intraventricular extension likely hypertensive with left hemiparesis, functional, gait and ADL impairments     HTN- labetolol, hydralazine, noravasc, enalapril  DM-2- lantus  Pain - percocet/morphine PRN  Dysphagia- MS/nectar diet  DVT PPX - No chemoppx due to ICH  Precautions- fall, aspiration  Rehab - Patient is a 67y old  Male who presents with right thalamic ICH      HPI:  68 yo RHD M  with PMH of HTN  (uncontrolled, does not regularly follow with PCP) presented to [facility] after fall while on roof at work, imaging showed a right thalamic ICH and transferred to [facility] on 1/11/18 for further management.     On admission to [facility], -180s and started on cardene drip, repeat imaging showed stable right thalamus and corona radiata ICH with mass effect and mild leftward midline shift and small IVH thought to be hypertensive bleed.  CTA was negative for significant stenosis, aneurysm/vascular abnormality or proximal intraluminal filling defect involving the anterior circulation; limited intraluminal contrast within the right V3 segment and tracing intraluminal contrast within the right V4 segment-- question acute or chronic stenosis/dissection, presence of intraluminal thrombus not excluded. CTV negative for venous sinus thrombosis.  --> no acute neurosurgical intervention and BP control per nsg.     CT C-spine negative for fracture;  SLP eval recommend mechanical soft with nectars.    On exam, patient reports neck pain and left sided weakness, occasional chest pain with deep breathing and constipation.     REVIEW OF SYSTEMS  Constitutional - + fatigue  HEENT - No eye pain, No difficulty hearing, No vertigo, +neck pain  Respiratory - No cough, No shortness of breath  Cardiovascular - CP with deep breathing  Gastrointestinal - No abdominal pain, No nausea, No vomiting, +constipation (a 5 days)  Genitourinary - No dysuria, No incontinence  Neurological - No headaches, No memory loss, left sided weakness and numbness  Skin - No itching, No rashes, No lesions   Endocrine - No temperature intolerance  Musculoskeletal - No joint pain, No joint swelling, No muscle pain  Psychiatric - No depression, No anxiety    PAST MEDICAL & SURGICAL HISTORY    HTN (hypertension)  CVA (cerebral vascular accident)  Diabetes  No significant past surgical history  No significant past surgical history      SOCIAL HISTORY  Smoking - Denied  EtOH - Denied   Drugs - Denied    FUNCTIONAL HISTORY  Lives alone in Room of house with 11 ANILA (1 HR)  Independent prior to admission, works in [employer]    CURRENT FUNCTIONAL STATUS  PT/OT eval pending    FAMILY HISTORY   Non-contributory    RECENT LABS/IMAGING  CBC Full  -  ( 13 Jan 2018 05:47 )  WBC Count : 7.5 K/uL  Hemoglobin : 12.1 g/dL  Hematocrit : 35.8 %  Platelet Count - Automated : 250 K/uL      01-13    137  |  99  |  30.0<H>  ----------------------------<  293<H>  3.6   |  26.0  |  1.59<H>    Ca    9.4      13 Jan 2018 05:47  Phos  3.4     01-13  Mg     2.3     01-13    Vital Signs Last 24 Hrs  T(C): 36.7 (14 Jan 2018 10:19), Max: 36.7 (14 Jan 2018 05:14)  T(F): 98.1 (14 Jan 2018 10:19), Max: 98.1 (14 Jan 2018 10:19)  HR: 71 (14 Jan 2018 10:19) (58 - 76)  BP: 144/68 (14 Jan 2018 10:19) (144/68 - 164/76)  BP(mean): 107 (13 Jan 2018 12:00) (107 - 107)  RR: 20 (14 Jan 2018 10:19) (18 - 23)  SpO2: 98% (14 Jan 2018 05:14) (98% - 98%)    ALLERGIES  No Known Allergies      MEDICATIONS   aluminum hydroxide/magnesium hydroxide/simethicone Suspension 30 milliLiter(s) Oral every 4 hours PRN  amLODIPine   Tablet 10 milliGRAM(s) Oral daily  dextrose 5%. 1000 milliLiter(s) IV Continuous <Continuous>  dextrose 50% Injectable 12.5 Gram(s) IV Push once  dextrose 50% Injectable 25 Gram(s) IV Push once  dextrose 50% Injectable 25 Gram(s) IV Push once  dextrose Gel 1 Dose(s) Oral once PRN  enalapril 10 milliGRAM(s) Oral two times a day  glucagon  Injectable 1 milliGRAM(s) IntraMuscular once PRN  hydrALAZINE Injectable 10 milliGRAM(s) IV Push every 4 hours PRN  influenza   Vaccine 0.5 milliLiter(s) IntraMuscular once  insulin glargine Injectable (LANTUS) 10 Unit(s) SubCutaneous at bedtime  insulin lispro (HumaLOG) corrective regimen sliding scale   SubCutaneous three times a day before meals  insulin lispro (HumaLOG) corrective regimen sliding scale   SubCutaneous at bedtime  labetalol 200 milliGRAM(s) Oral three times a day  morphine  - Injectable 2 milliGRAM(s) IV Push every 4 hours PRN  pantoprazole    Tablet 40 milliGRAM(s) Oral before breakfast  sodium chloride 0.45%. 1000 milliLiter(s) IV Continuous <Continuous>    ----------------------------------------------------------------------------------------  PHYSICAL EXAM  Constitutional - NAD, Comfortable  HEENT - NCAT, EOMI  Chest - No distress, symmetrical chest expansion  Cardiovascular - RRR, S1S2  Abdomen - Soft, NTND  Extremities - No C/C/E, No calf tenderness   Neurologic Exam -                    Cognitive - Awake, Alert, AAO to self, place, January 1998, situation     Communication - +dysarthria     Cranial Nerves -loss left nasolabial fold     Motor                     LEFT    UE - ShAB 1/5, EF 1/5, EE 2/5, WE 1/5,  1/5                    RIGHT UE - ShAB 5/5, EF 5/5, EE 5/5, WE 5/5,  5/5                    LEFT    LE - HF 1/5, KE 1/5, DF 0/5, PF 0/5                    RIGHT LE - HF 5/5, KE 5/5, DF 5/5, PF 5/5        Sensory - decreased sensation left UE and LE      Reflexes - Negative Hoffmans bilaterally     Psychiatric - Flat Affect   ----------------------------------------------------------------------------------------  ASSESSMENT/PLAN- 67M admitted with right thalamic and corona radiata ICH with intraventricular extension likely hypertensive with left hemiparesis, dysphagia, functional, gait and ADL impairments     HTN- labetolol, hydralazine, noravasc, enalapril  DM-2- lantus  Pain - percocet/morphine PRN  Dysphagia- MS/nectar diet  DVT PPX - No chemoppx due to ICH  Precautions- fall, aspiration  Rehab - Once medically stable and work-up completed, would recommend ACUTE inpatient rehabilitation for the functional deficits consisting of 3 hours of therapy/day & 24 hour RN/daily PMR physician for comorbid medical management.

## 2018-01-14 DIAGNOSIS — R07.89 OTHER CHEST PAIN: ICD-10-CM

## 2018-01-14 DIAGNOSIS — I10 ESSENTIAL (PRIMARY) HYPERTENSION: ICD-10-CM

## 2018-01-14 LAB
ANION GAP SERPL CALC-SCNC: 14 MMOL/L — SIGNIFICANT CHANGE UP (ref 5–17)
BUN SERPL-MCNC: 26 MG/DL — HIGH (ref 8–20)
CALCIUM SERPL-MCNC: 9.2 MG/DL — SIGNIFICANT CHANGE UP (ref 8.6–10.2)
CHLORIDE SERPL-SCNC: 101 MMOL/L — SIGNIFICANT CHANGE UP (ref 98–107)
CO2 SERPL-SCNC: 24 MMOL/L — SIGNIFICANT CHANGE UP (ref 22–29)
CREAT SERPL-MCNC: 1.44 MG/DL — HIGH (ref 0.5–1.3)
D DIMER BLD IA.RAPID-MCNC: 429 NG/ML DDU — HIGH
GLUCOSE BLDC GLUCOMTR-MCNC: 142 MG/DL — HIGH (ref 70–99)
GLUCOSE BLDC GLUCOMTR-MCNC: 155 MG/DL — HIGH (ref 70–99)
GLUCOSE BLDC GLUCOMTR-MCNC: 179 MG/DL — HIGH (ref 70–99)
GLUCOSE BLDC GLUCOMTR-MCNC: 210 MG/DL — HIGH (ref 70–99)
GLUCOSE SERPL-MCNC: 169 MG/DL — HIGH (ref 70–115)
HCT VFR BLD CALC: 38.1 % — LOW (ref 42–52)
HGB BLD-MCNC: 12.9 G/DL — LOW (ref 14–18)
MCHC RBC-ENTMCNC: 28.6 PG — SIGNIFICANT CHANGE UP (ref 27–31)
MCHC RBC-ENTMCNC: 33.9 G/DL — SIGNIFICANT CHANGE UP (ref 32–36)
MCV RBC AUTO: 84.5 FL — SIGNIFICANT CHANGE UP (ref 80–94)
PLATELET # BLD AUTO: 242 K/UL — SIGNIFICANT CHANGE UP (ref 150–400)
POTASSIUM SERPL-MCNC: 3.9 MMOL/L — SIGNIFICANT CHANGE UP (ref 3.5–5.3)
POTASSIUM SERPL-SCNC: 3.9 MMOL/L — SIGNIFICANT CHANGE UP (ref 3.5–5.3)
RBC # BLD: 4.51 M/UL — LOW (ref 4.6–6.2)
RBC # FLD: 13.1 % — SIGNIFICANT CHANGE UP (ref 11–15.6)
SODIUM SERPL-SCNC: 139 MMOL/L — SIGNIFICANT CHANGE UP (ref 135–145)
TROPONIN T SERPL-MCNC: <0.01 NG/ML — SIGNIFICANT CHANGE UP (ref 0–0.06)
TROPONIN T SERPL-MCNC: <0.01 NG/ML — SIGNIFICANT CHANGE UP (ref 0–0.06)
WBC # BLD: 7.5 K/UL — SIGNIFICANT CHANGE UP (ref 4.8–10.8)
WBC # FLD AUTO: 7.5 K/UL — SIGNIFICANT CHANGE UP (ref 4.8–10.8)

## 2018-01-14 PROCEDURE — 71275 CT ANGIOGRAPHY CHEST: CPT | Mod: 26

## 2018-01-14 PROCEDURE — 99223 1ST HOSP IP/OBS HIGH 75: CPT

## 2018-01-14 PROCEDURE — 99233 SBSQ HOSP IP/OBS HIGH 50: CPT

## 2018-01-14 PROCEDURE — 99231 SBSQ HOSP IP/OBS SF/LOW 25: CPT

## 2018-01-14 PROCEDURE — 99223 1ST HOSP IP/OBS HIGH 75: CPT | Mod: GC

## 2018-01-14 PROCEDURE — 93010 ELECTROCARDIOGRAM REPORT: CPT

## 2018-01-14 RX ORDER — INSULIN GLARGINE 100 [IU]/ML
14 INJECTION, SOLUTION SUBCUTANEOUS AT BEDTIME
Refills: 0 | Status: DISCONTINUED | OUTPATIENT
Start: 2018-01-14 | End: 2018-01-26

## 2018-01-14 RX ORDER — OXYCODONE AND ACETAMINOPHEN 5; 325 MG/1; MG/1
1 TABLET ORAL EVERY 4 HOURS
Refills: 0 | Status: DISCONTINUED | OUTPATIENT
Start: 2018-01-14 | End: 2018-01-21

## 2018-01-14 RX ORDER — LABETALOL HCL 100 MG
100 TABLET ORAL THREE TIMES A DAY
Refills: 0 | Status: DISCONTINUED | OUTPATIENT
Start: 2018-01-14 | End: 2018-01-17

## 2018-01-14 RX ADMIN — Medication 2: at 12:45

## 2018-01-14 RX ADMIN — AMLODIPINE BESYLATE 10 MILLIGRAM(S): 2.5 TABLET ORAL at 05:16

## 2018-01-14 RX ADMIN — SODIUM CHLORIDE 75 MILLILITER(S): 9 INJECTION, SOLUTION INTRAVENOUS at 08:39

## 2018-01-14 RX ADMIN — Medication 10 MILLIGRAM(S): at 17:45

## 2018-01-14 RX ADMIN — Medication 100 MILLIGRAM(S): at 22:15

## 2018-01-14 RX ADMIN — INSULIN GLARGINE 14 UNIT(S): 100 INJECTION, SOLUTION SUBCUTANEOUS at 22:15

## 2018-01-14 RX ADMIN — OXYCODONE AND ACETAMINOPHEN 1 TABLET(S): 5; 325 TABLET ORAL at 16:15

## 2018-01-14 RX ADMIN — Medication: at 09:00

## 2018-01-14 RX ADMIN — PANTOPRAZOLE SODIUM 40 MILLIGRAM(S): 20 TABLET, DELAYED RELEASE ORAL at 05:16

## 2018-01-14 RX ADMIN — Medication 100 MILLIGRAM(S): at 15:40

## 2018-01-14 RX ADMIN — OXYCODONE AND ACETAMINOPHEN 1 TABLET(S): 5; 325 TABLET ORAL at 15:44

## 2018-01-14 RX ADMIN — Medication 10 MILLIGRAM(S): at 05:16

## 2018-01-14 NOTE — PHYSICAL THERAPY INITIAL EVALUATION ADULT - PHYSICAL ASSIST/NONPHYSICAL ASSIST: GAIT, REHAB EVAL
assist with left knee blocking, and with advancing the left foot/verbal cues/nonverbal cues (demo/gestures)/2 person assist

## 2018-01-14 NOTE — PROGRESS NOTE ADULT - SUBJECTIVE AND OBJECTIVE BOX
Internal Medicine Hospitalist - Dr. Trev MOYER    [id number]    67y      Male    Patient is a 67y old  Male who presents with a chief complaint of stroke (11 Jan 2018 21:41)    INTERVAL HPI/ OVERNIGHT EVENTS: Patient is seen and examined, pt report chest pain yesterday, EKG unchanged, troponin negative, likely muscular, denied chest pain, SOB.     REVIEW OF SYSTEMS:    Denied fever, chills, abd. pain, nausea, vomiting, chest pain, SOB, headache, dizziness    PHYSICAL EXAM:    Vital Signs Last 24 Hrs  T(C): 36.7 (14 Jan 2018 05:14), Max: 36.7 (14 Jan 2018 05:14)  T(F): 98 (14 Jan 2018 05:14), Max: 98 (14 Jan 2018 05:14)  HR: 58 (14 Jan 2018 05:14) (58 - 76)  BP: 150/60 (14 Jan 2018 05:14) (150/60 - 164/76)  BP(mean): 107 (13 Jan 2018 12:00) (103 - 111)  RR: 18 (14 Jan 2018 05:14) (18 - 40)  SpO2: 98% (14 Jan 2018 05:14) (97% - 98%)    GENERAL: NAD  Neck: supple  CHEST/LUNG: Clear to percussion bilaterally; No wheezing  HEART: S1S2+ audible  ABDOMEN: Soft, Nontender, Nondistended; Bowel sounds present  EXTREMITIES:  no edema  CNS: AAO X 3  Psychiatry: normal mood    LABS:                        12.1   7.5   )-----------( 250      ( 13 Jan 2018 05:47 )             35.8     01-14    139  |  101  |  26.0<H>  ----------------------------<  169<H>  3.9   |  24.0  |  1.44<H>    Ca    9.2      14 Jan 2018 05:47  Phos  3.4     01-13  Mg     2.3     01-13        MEDICATIONS  (STANDING):  amLODIPine   Tablet 10 milliGRAM(s) Oral daily  dextrose 5%. 1000 milliLiter(s) (50 mL/Hr) IV Continuous <Continuous>  dextrose 50% Injectable 12.5 Gram(s) IV Push once  dextrose 50% Injectable 25 Gram(s) IV Push once  dextrose 50% Injectable 25 Gram(s) IV Push once  enalapril 10 milliGRAM(s) Oral two times a day  influenza   Vaccine 0.5 milliLiter(s) IntraMuscular once  insulin glargine Injectable (LANTUS) 14 Unit(s) SubCutaneous at bedtime  insulin lispro (HumaLOG) corrective regimen sliding scale   SubCutaneous three times a day before meals  insulin lispro (HumaLOG) corrective regimen sliding scale   SubCutaneous at bedtime  labetalol 200 milliGRAM(s) Oral three times a day  pantoprazole    Tablet 40 milliGRAM(s) Oral before breakfast  sodium chloride 0.45%. 1000 milliLiter(s) (75 mL/Hr) IV Continuous <Continuous>    MEDICATIONS  (PRN):  aluminum hydroxide/magnesium hydroxide/simethicone Suspension 30 milliLiter(s) Oral every 4 hours PRN Dyspepsia  dextrose Gel 1 Dose(s) Oral once PRN Blood Glucose LESS THAN 70 milliGRAM(s)/deciliter  glucagon  Injectable 1 milliGRAM(s) IntraMuscular once PRN Glucose LESS THAN 70 milligrams/deciliter  hydrALAZINE Injectable 10 milliGRAM(s) IV Push every 4 hours PRN sbp>160  morphine  - Injectable 2 milliGRAM(s) IV Push every 4 hours PRN Severe Pain (7 - 10)      RADIOLOGY & ADDITIONAL TEST

## 2018-01-14 NOTE — PHYSICAL THERAPY INITIAL EVALUATION ADULT - IMPAIRMENTS CONTRIBUTING TO GAIT DEVIATIONS, PT EVAL
impaired balance/impaired coordination/impaired motor control/abnormal muscle tone/impaired postural control/decreased strength

## 2018-01-14 NOTE — PROGRESS NOTE ADULT - SUBJECTIVE AND OBJECTIVE BOX
[facility]                                        Neurology at [place]                                 Ezekiel Sepulveda, & Brian                                  [address]                                        [place], [place], [address]                                             [phone]        No new complaints.    Vital Signs Last 24 Hrs  T(F): 98.1 (14 Jan 2018 10:19), Max: 98.1 (14 Jan 2018 10:19)  HR: 71 (14 Jan 2018 10:19) (58 - 76)  BP: 144/68 (14 Jan 2018 10:19) (144/68 - 164/76)  RR: 20 (14 Jan 2018 10:19) (18 - 20)  SpO2: 98% (14 Jan 2018 05:14) (98% - 98%)    Awake and alert.  Pupils react.  Face symmetric.  Good strength on right.   0/5 on left arm and leg proximally. 1-2/5 distally.

## 2018-01-14 NOTE — PROGRESS NOTE ADULT - ASSESSMENT
This is 66M with PMHX of HTN transfer from [facility], presented with AMS. Head Ct showed a Right Thalamic Hemorrhagic Bleed with some Effacement of the Lateral Ventricle and surrounding Edema.  He was brought to [facility] for further management and Neurosurgical evaluation. On admission BP was 170 and he was placed on a Cardene Infusion and admitted to ICU. He was seen by NS, repeat CT head No change right thalamic intracerebral hematoma since the   prior day. No evidence of midline shift on the current study. Mild persistent surrounding edema and mass effect in the right lateral ventricle, per NS - No acute neurosurgical intervention warranted at this time. Pt is downgraded to hospitalist office.     A/P    >ICH with left sided hemiplegia due to uncontrolled HTN   appreciate NS input - No acute neurosurgical intervention warranted at this time  repeat CT head stable    neuroc check q6h   Neurology consult appreciated   appreciate speech eval - on dysphagia diet   control BP  PPI  PT/OT, Rehab     >HTN emergency - improving   decrease labetalol 100 TID due to bradycardia,   c/w amlodipine 10, enalapril 10 bid  hydralazine prn   TTE - result pending    >Chest pain- serial troponin negative, EKG unchanged  TTE pending     >DM - A1c 10.9   , increase lantus 14 units plus sliding scale     >ALIVIA - due to poor oral intake, slowly improving   gentle hydration, f/u BMP     >DVT PPX - SCD This is 66M with PMHX of HTN transfer from [facility], presented with AMS. Head Ct showed a Right Thalamic Hemorrhagic Bleed with some Effacement of the Lateral Ventricle and surrounding Edema.  He was brought to [facility] for further management and Neurosurgical evaluation. On admission BP was 170 and he was placed on a Cardene Infusion and admitted to ICU. He was seen by NS, repeat CT head No change right thalamic intracerebral hematoma since the   prior day. No evidence of midline shift on the current study. Mild persistent surrounding edema and mass effect in the right lateral ventricle, per NS - No acute neurosurgical intervention warranted at this time. Pt is downgraded to hospitalist office.     A/P    >ICH with left sided hemiplegia due to uncontrolled HTN   appreciate NS input - No acute neurosurgical intervention warranted at this time  repeat CT head stable    neuroc check q6h   Neurology consult appreciated   appreciate speech eval - on dysphagia diet   control BP  PPI  PT/OT, Rehab     >HTN emergency - improving   decrease labetalol 100 TID due to bradycardia,   c/w amlodipine 10, enalapril 10 bid  hydralazine prn   TTE - result pending    >Chest pain- resolved   serial troponin negative, EKG unchanged  TTE pending   cardiology consulted     >DM - A1c 10.9   , increase lantus 14 units plus sliding scale     >ALIIVA - due to poor oral intake, slowly improving   gentle hydration, f/u BMP     >DVT PPX - SCD

## 2018-01-14 NOTE — CONSULT NOTE ADULT - PROBLEM SELECTOR RECOMMENDATION 2
continue amLODIPine   Tablet 10 milliGRAM(s) Oral daily  labetalol 100 milliGRAM(s) Oral three times a day  enalapril 10 milliGRAM(s) Oral two times a day  b/p well controlled now

## 2018-01-14 NOTE — CONSULT NOTE ADULT - PROBLEM SELECTOR RECOMMENDATION 9
Tronponin negative x 2 check tronponin now  Ekg without new changes  unlikely cardiac  in lieu of pleuritic component will draw a D dimer  Continue sequential compression devices  DVT prophylaxsis contraindicated due to ich  Pain is reproducible and likely of muscular origin  Oxycodone prn for pain Tronponin negative x 2 check troponin now  Ekg without new changes  in lieu of pleuritic component will draw a D dimer  Continue sequential compression devices  DVT prophylaxsis contraindicated due to ich  Pain is reproducible and likely of muscular origin  Oxycodone prn for pain

## 2018-01-14 NOTE — PROGRESS NOTE ADULT - ASSESSMENT
The patient is a 67y Male status post hypertensive right thalamic intracranial hemorrhage.     Control blood pressure.    Rehab ELIDA vs Acute when medically stable.     No further specific neurologic recommendations. Will be available as needed.

## 2018-01-14 NOTE — CONSULT NOTE ADULT - ATTENDING COMMENTS
Rehab - Recommend ACUTE inpatient rehabilitation for the functional deficits consisting of 3 hours of therapy/day & 24 hour RN/daily PMR physician for comorbid medical management. Will continue to follow for ongoing rehab needs and recommendations.     Continue bedside therapy as well as OOB throughout the day with mobilization throughout the day with staff to maintain cardiopulmonary function and prevention of secondary complications related to debility.
NSGY Attg:    see above  imaging reviewed   supportive care per ICU
TTE reviewed. Moderately depressed LV function.   Plan for cardiac cath on tuesday.

## 2018-01-14 NOTE — CONSULT NOTE ADULT - SUBJECTIVE AND OBJECTIVE BOX
This is a male with Pmh of untreated hypertension who presented to our facility after falling and sustaining a right thalamic ICH        68 yo RHD M  with PMH of HTN  (uncontrolled, does not regularly follow with PCP), CVA, DM who presented to [facility] after fall while on roof at work, imaging showed a right thalamic ICH and transferred to [facility] on 1/11/18 for further management. Being managemed for right thalamic intracranial bleed.    Asked to see patient for chest discomfort  Patient states it is in his mid chest, reproducible and has a pleuritc component to it.   He denies any sob   Has not been on DVT prophlaxsis due to ICB    PMH  Hypertension  CVA  DM    PSH This is a male with Pmh of untreated hypertension who presented to our facility after falling and sustaining a right thalamic ICH.    During stay for right thalamic bleed he is complaining of back and chest discomfort      Asked to see patient for chest discomfort  Patient states it is in his mid chest, reproducible and has a pleuritc component to it.   He denies any sob   Has not been on DVT prophlaxsis due to ICB    PMH  Hypertension  Denies any previous hx of cad or cva    PSH  Denies    Social hx  Never smoked  Denies etoh    Family hx   Non contributory Cardiology consult    HPI:  67 y/o transferred from [facility] with a PMH of uncontrolled HTN after falling off ladder and found to have a left thalmic intracranial hemmorhage.  Treated for hypertensive crisis with vicky  Asked to see patient for chest discomfort which has been occurring for several days.  Also having upper back pain  Pain is reproducible and also has a pleutic component to it  Cardiac monitoring reveals sinus rhythmn in the 60s    PMH  HTN    PSH  Denies    Allergies  No Known Allergies    Social hx  Non smoker, no etoh    Family hx non contributory    MEDICATIONS  (STANDING):  amLODIPine   Tablet 10 milliGRAM(s) Oral daily  labetalol 100 milliGRAM(s) Oral three times a day  enalapril 10 milliGRAM(s) Oral two times a day  insulin glargine Injectable (LANTUS) 14 Unit(s) SubCutaneous at bedtime  insulin lispro (HumaLOG) corrective regimen sliding scale   SubCutaneous three times a day before meals  pantoprazole    Tablet 40 milliGRAM(s) Oral before breakfast  oxyCODONE    5 mG/acetaminophen 325 mG 1 Tablet(s) Oral every 4 hours PRN Moderate Pain (4 - 6)          REVIEW OF SYSTEMS:  CONSTITUTIONAL: No fever, weight loss, or fatigue  NECK: + pain in upper back and neck  RESPIRATORY: No cough, wheezing, chills or hemoptysis; No Shortness of Breath  CARDIOVASCULAR: See above, no palpitation  GASTROINTESTINAL: No abdominal or epigastric pain. No nausea, vomiting, or hematemesis; No diarrhea or constipation. No melena or hematochezia.  GENITOURINARY: No dysuria, frequency, hematuria, or incontinence  NEUROLOGICAL: No headaches, memory loss, loss of strength, numbness, or tremors  SKIN: No itching, burning, rashes, or lesions   EMUSCULOSKELETAL: Pain in upper back right shoulder and chest  PSYCHIATRIC: No depression, anxiety, mood swings, or difficulty sleeping  HEME/LYMPH: No easy bruising, or bleeding gums  	    Vital Signs Last 24 Hrs  T(C): 36.7 (2018 10:19), Max: 36.7 (2018 05:14)  T(F): 98.1 (2018 10:19), Max: 98.1 (2018 10:19)  HR: 71 (2018 10:19) (58 - 76)  BP: 144/68 (2018 10:19) (144/68 - 164/76)  BP(mean): --  RR: 20 (2018 10:19) (18 - 20)  SpO2: 98% (2018 05:14) (98% - 98%)    Daily     Daily Weight in k.6 (2018 05:21)    I&O's Detail    2018 07:01  -  2018 07:00  --------------------------------------------------------  IN:    Oral Fluid: 100 mL  Total IN: 100 mL    OUT:    Voided: 500 mL  Total OUT: 500 mL    Total NET: -400 mL        PHYSICAL EXAM:  Calm 	  HEENT:   Normal oral mucosa, PERRL, EOMI, sclera non-icteric	  Lymphatic: No cervical lymphadenopathy  Cardiovascular: Normal S1 S2, No JVD,  + reproducible pain across the chest wall  No brusining  Respiratory: Lungs clear to auscultation	  Psychiatry: A & O x 3, Mood & affect appropriate  Gastrointestinal:  Soft, Non-tender, + BS, no bruits	  Skin: No rashes, No ecchymoses, No cyanosis  Neurologic: alert oriented able to give his history  Left arm weakness  Extremities: Normal range of motion, No clubbing, cyanosis or edema  Pain right shoulder  Vascular: Peripheral pulses palpable 2+ bilaterally      INTERPRETATION OF TELEMETRY:  Sinus no arrhythmias    ECG:  Ekg Nsr at 67 .18/.08/.40  T inverted in 1, avl v5 & v6 inverted p wave v1  Lvh with repolarization abnormality  Unchanged from ekg 2018    LABS:                        12.1   7.5   )-----------( 250      ( 2018 05:47 )             35.8     01-14    139  |  101  |  26.0<H>  ----------------------------<  169<H>  3.9   |  24.0  |  1.44<H>    Ca    9.2      2018 05:47  Phos  3.4     01-13  Mg     2.3     01-13      CARDIAC MARKERS ( 2018 05:49 )  x     / <0.01 ng/mL / x     / x     / x      CARDIAC MARKERS ( 2018 17:12 )  x     / 0.01 ng/mL / x     / x     / x      CARDIAC MARKERS ( 2018 20:08 )  x     / <0.01 ng/mL / x     / x     / x              I&O's Summary    2018 07:01  -  2018 07:00  --------------------------------------------------------  IN: 100 mL / OUT: 500 mL / NET: -400 mL

## 2018-01-14 NOTE — PHYSICAL THERAPY INITIAL EVALUATION ADULT - GAIT DEVIATIONS NOTED, PT EVAL
decreased jas/increased time in double stance/decreased step length/decreased weight-shifting ability

## 2018-01-15 LAB
ANION GAP SERPL CALC-SCNC: 16 MMOL/L — SIGNIFICANT CHANGE UP (ref 5–17)
BUN SERPL-MCNC: 25 MG/DL — HIGH (ref 8–20)
CALCIUM SERPL-MCNC: 9.1 MG/DL — SIGNIFICANT CHANGE UP (ref 8.6–10.2)
CHLORIDE SERPL-SCNC: 99 MMOL/L — SIGNIFICANT CHANGE UP (ref 98–107)
CO2 SERPL-SCNC: 23 MMOL/L — SIGNIFICANT CHANGE UP (ref 22–29)
CREAT SERPL-MCNC: 1.46 MG/DL — HIGH (ref 0.5–1.3)
GLUCOSE BLDC GLUCOMTR-MCNC: 112 MG/DL — HIGH (ref 70–99)
GLUCOSE BLDC GLUCOMTR-MCNC: 115 MG/DL — HIGH (ref 70–99)
GLUCOSE BLDC GLUCOMTR-MCNC: 168 MG/DL — HIGH (ref 70–99)
GLUCOSE BLDC GLUCOMTR-MCNC: 177 MG/DL — HIGH (ref 70–99)
GLUCOSE SERPL-MCNC: 114 MG/DL — SIGNIFICANT CHANGE UP (ref 70–115)
HCT VFR BLD CALC: 38.3 % — LOW (ref 42–52)
HGB BLD-MCNC: 13 G/DL — LOW (ref 14–18)
MCHC RBC-ENTMCNC: 28.3 PG — SIGNIFICANT CHANGE UP (ref 27–31)
MCHC RBC-ENTMCNC: 33.9 G/DL — SIGNIFICANT CHANGE UP (ref 32–36)
MCV RBC AUTO: 83.3 FL — SIGNIFICANT CHANGE UP (ref 80–94)
PLATELET # BLD AUTO: 237 K/UL — SIGNIFICANT CHANGE UP (ref 150–400)
POTASSIUM SERPL-MCNC: 3.7 MMOL/L — SIGNIFICANT CHANGE UP (ref 3.5–5.3)
POTASSIUM SERPL-SCNC: 3.7 MMOL/L — SIGNIFICANT CHANGE UP (ref 3.5–5.3)
RBC # BLD: 4.6 M/UL — SIGNIFICANT CHANGE UP (ref 4.6–6.2)
RBC # FLD: 13.1 % — SIGNIFICANT CHANGE UP (ref 11–15.6)
SODIUM SERPL-SCNC: 138 MMOL/L — SIGNIFICANT CHANGE UP (ref 135–145)
WBC # BLD: 8 K/UL — SIGNIFICANT CHANGE UP (ref 4.8–10.8)
WBC # FLD AUTO: 8 K/UL — SIGNIFICANT CHANGE UP (ref 4.8–10.8)

## 2018-01-15 PROCEDURE — 99232 SBSQ HOSP IP/OBS MODERATE 35: CPT

## 2018-01-15 PROCEDURE — 76770 US EXAM ABDO BACK WALL COMP: CPT | Mod: 26

## 2018-01-15 PROCEDURE — 99233 SBSQ HOSP IP/OBS HIGH 50: CPT

## 2018-01-15 PROCEDURE — 76775 US EXAM ABDO BACK WALL LIM: CPT | Mod: 26

## 2018-01-15 RX ORDER — ACETYLCYSTEINE 200 MG/ML
1200 VIAL (ML) MISCELLANEOUS
Refills: 0 | Status: COMPLETED | OUTPATIENT
Start: 2018-01-15 | End: 2018-01-17

## 2018-01-15 RX ORDER — ACETYLCYSTEINE 200 MG/ML
1200 VIAL (ML) MISCELLANEOUS
Refills: 0 | Status: DISCONTINUED | OUTPATIENT
Start: 2018-01-15 | End: 2018-01-15

## 2018-01-15 RX ORDER — SODIUM BICARBONATE 1 MEQ/ML
0.15 SYRINGE (ML) INTRAVENOUS
Qty: 150 | Refills: 0 | Status: DISCONTINUED | OUTPATIENT
Start: 2018-01-15 | End: 2018-01-16

## 2018-01-15 RX ORDER — SPIRONOLACTONE 25 MG/1
25 TABLET, FILM COATED ORAL DAILY
Refills: 0 | Status: DISCONTINUED | OUTPATIENT
Start: 2018-01-15 | End: 2018-01-18

## 2018-01-15 RX ADMIN — OXYCODONE AND ACETAMINOPHEN 1 TABLET(S): 5; 325 TABLET ORAL at 05:11

## 2018-01-15 RX ADMIN — Medication 75 MEQ/KG/HR: at 23:28

## 2018-01-15 RX ADMIN — Medication 100 MILLIGRAM(S): at 23:28

## 2018-01-15 RX ADMIN — Medication 10 MILLIGRAM(S): at 05:11

## 2018-01-15 RX ADMIN — Medication 100 MILLIGRAM(S): at 13:29

## 2018-01-15 RX ADMIN — Medication 1: at 12:38

## 2018-01-15 RX ADMIN — PANTOPRAZOLE SODIUM 40 MILLIGRAM(S): 20 TABLET, DELAYED RELEASE ORAL at 05:11

## 2018-01-15 RX ADMIN — OXYCODONE AND ACETAMINOPHEN 1 TABLET(S): 5; 325 TABLET ORAL at 23:26

## 2018-01-15 RX ADMIN — Medication 100 MILLIGRAM(S): at 05:11

## 2018-01-15 RX ADMIN — OXYCODONE AND ACETAMINOPHEN 1 TABLET(S): 5; 325 TABLET ORAL at 05:41

## 2018-01-15 RX ADMIN — SODIUM CHLORIDE 75 MILLILITER(S): 9 INJECTION, SOLUTION INTRAVENOUS at 13:29

## 2018-01-15 RX ADMIN — AMLODIPINE BESYLATE 10 MILLIGRAM(S): 2.5 TABLET ORAL at 05:11

## 2018-01-15 RX ADMIN — Medication 1200 MILLIGRAM(S): at 17:16

## 2018-01-15 RX ADMIN — INSULIN GLARGINE 14 UNIT(S): 100 INJECTION, SOLUTION SUBCUTANEOUS at 23:26

## 2018-01-15 RX ADMIN — Medication 10 MILLIGRAM(S): at 17:16

## 2018-01-15 NOTE — PROGRESS NOTE ADULT - ASSESSMENT
Assessment and Recommendation:   · Assessment		  67 y/o transferred from [facility] with a PMH of uncontrolled HTN after falling off ladder and found to have a left thalmic intracranial hemorrhage  Treated for hypertensive crisis with cardene  Patient with thalamic bleed, seen by neuro and nephrology today.   TTE with EF 30% and plans for cardiac cath.       Problem/Recommendation - 1:  Problem: Chest pain, atypical.   Continue sequential compression devices  DVT prophylaxsis contraindicated due to ich  TTE ef 30%, plan for cardiac cath on teusday.       Problem/Recommendation - 2:  ·  Problem: Hypertension, unspecified type.  Recommendation: continue amLODIPine   Tablet 10 milliGRAM(s) Oral daily  labetalol 100 milliGRAM(s) Oral three times a day  enalapril 10 milliGRAM(s) Oral two times a day  b/p well controlled now.

## 2018-01-15 NOTE — PROGRESS NOTE ADULT - ASSESSMENT
This is 66M with PMHX of HTN transfer from [facility], presented with AMS. Head Ct showed a Right Thalamic Hemorrhagic Bleed with some Effacement of the Lateral Ventricle and surrounding Edema.  He was brought to [facility] for further management and Neurosurgical evaluation. On admission BP was 170 and he was placed on a Cardene Infusion and admitted to ICU. He was seen by NS, repeat CT head No change right thalamic intracerebral hematoma since the   prior day. No evidence of midline shift on the current study. Mild persistent surrounding edema and mass effect in the right lateral ventricle, per NS - No acute neurosurgical intervention warranted at this time. Pt is downgraded to hospitalist office. Pt report chest pain, EKG unchanged, troponin negative, seen by cardiology, TTE showed LVEF of 30-35%, CTA negative for PE, positive for atherosclerotic calcicfication and mural thrombus throughout aorta,   No thoracic aortic aneurysm or dissection, discussed with Dr. Formna, will do diagnostic cardiac cath tomorrow, no AC due to recent ICH.     A/P    >ICH with left sided hemiplegia due to uncontrolled HTN   appreciate NS input - No acute neurosurgical intervention warranted at this time  repeat CT head stable    neuroc check q6h   Neurology consult appreciated   appreciate speech eval - on dysphagia diet - will get repeat eval to see if we can advance diet   control BP  PPI  PT/OT, Rehab     >HTN emergency - improving   c/w labetalol 100 TID due to bradycardia,   c/w amlodipine 10, enalapril 10 bid  BP is still high, per cardiology started on Aldactone 25 daily for resistant HTN   hydralazine prn     >Chest pain- resolved   serial troponin negative, EKG unchanged  TTE showed LVEF of 30-35%, CTA negative for PE, positive for atherosclerotic calcicfication and mural thrombus throughout aorta,   No thoracic aortic aneurysm or dissection,  discussed with Dr. Forman, will do diagnostic cardiac cath tomorrow, no AC due to recent ICH.   renal consult due to Fabby and schedule for cath tomorrow     >DM - A1c 10.9   , c/w lantus 14 units plus sliding scale     >FABBY - persistent   renal consulted, cath in am - defer IVF and mucormyst per renal   renal u/s     >DVT PPX - SCD

## 2018-01-15 NOTE — PROGRESS NOTE ADULT - SUBJECTIVE AND OBJECTIVE BOX
Internal Medicine Hospitalist - Dr. Trev MOYER    [id number]    67y      Male    Patient is a 67y old  Male who presents with a chief complaint of stroke (11 Jan 2018 21:41)    INTERVAL HPI/ OVERNIGHT EVENTS: Patient is seen and examined, denied any new complaints     REVIEW OF SYSTEMS:    Denied fever, chills, abd. pain, nausea, vomiting, chest pain, SOB, headache, dizziness    PHYSICAL EXAM:    Vital Signs Last 24 Hrs  T(C): 36.6 (15 Slava 2018 10:15), Max: 36.8 (14 Jan 2018 15:49)  T(F): 97.9 (15 Slava 2018 10:15), Max: 98.2 (14 Jan 2018 15:49)  HR: 73 (15 Slava 2018 10:15) (59 - 73)  BP: 131/56 (15 Slava 2018 10:15) (131/56 - 165/78)  BP(mean): --  RR: 18 (15 Slava 2018 10:15) (18 - 18)  SpO2: 100% (15 Slava 2018 05:06) (98% - 100%)    GENERAL: NAD  Neck: supple  CHEST/LUNG: positive air entry   HEART: S1S2+ audible  ABDOMEN: Soft, Nontender, Nondistended; Bowel sounds present  EXTREMITIES:  no edema  CNS: Awake, left sided hemiplegia     LABS:                        13.0   8.0   )-----------( 237      ( 15 Slava 2018 06:09 )             38.3     01-15    138  |  99  |  25.0<H>  ----------------------------<  114  3.7   |  23.0  |  1.46<H>    Ca    9.1      15 Slava 2018 06:09              MEDICATIONS  (STANDING):  amLODIPine   Tablet 10 milliGRAM(s) Oral daily  dextrose 5%. 1000 milliLiter(s) (50 mL/Hr) IV Continuous <Continuous>  dextrose 50% Injectable 12.5 Gram(s) IV Push once  dextrose 50% Injectable 25 Gram(s) IV Push once  dextrose 50% Injectable 25 Gram(s) IV Push once  enalapril 10 milliGRAM(s) Oral two times a day  influenza   Vaccine 0.5 milliLiter(s) IntraMuscular once  insulin glargine Injectable (LANTUS) 14 Unit(s) SubCutaneous at bedtime  insulin lispro (HumaLOG) corrective regimen sliding scale   SubCutaneous three times a day before meals  insulin lispro (HumaLOG) corrective regimen sliding scale   SubCutaneous at bedtime  labetalol 100 milliGRAM(s) Oral three times a day  pantoprazole    Tablet 40 milliGRAM(s) Oral before breakfast  sodium chloride 0.45%. 1000 milliLiter(s) (75 mL/Hr) IV Continuous <Continuous>  spironolactone 25 milliGRAM(s) Oral daily    MEDICATIONS  (PRN):  aluminum hydroxide/magnesium hydroxide/simethicone Suspension 30 milliLiter(s) Oral every 4 hours PRN Dyspepsia  dextrose Gel 1 Dose(s) Oral once PRN Blood Glucose LESS THAN 70 milliGRAM(s)/deciliter  glucagon  Injectable 1 milliGRAM(s) IntraMuscular once PRN Glucose LESS THAN 70 milligrams/deciliter  hydrALAZINE Injectable 10 milliGRAM(s) IV Push every 4 hours PRN sbp>160  morphine  - Injectable 2 milliGRAM(s) IV Push every 4 hours PRN Severe Pain (7 - 10)  oxyCODONE    5 mG/acetaminophen 325 mG 1 Tablet(s) Oral every 4 hours PRN Moderate Pain (4 - 6)      RADIOLOGY & ADDITIONAL TEST    < from: TTE Echo Complete w/Doppler (01.13.18 @ 14:23) >    Summary:   1. Left ventricular ejection fraction, by visual estimation, is 30 to   35%.   2. Moderately decreased global left ventricular systolic function.   3. There is mild concentric left ventricular hypertrophy.   4. Mild to moderate mitral annular calcification.   5. Thickening of the anterior and posterior mitral valve leaflets.   6. Sclerotic aortic valve with normal opening.    < end of copied text >    < from: CT Angio Chest w/ IV Cont (01.14.18 @ 21:20) >  IMPRESSION:           Extensive atherosclerotic calcification and mural thrombus throughout   aorta..      No thoracic aortic aneurysm or dissection..      Cardiac disease.

## 2018-01-15 NOTE — PROGRESS NOTE ADULT - SUBJECTIVE AND OBJECTIVE BOX
[place] [facility]                                                        Office: [address] [address]                                                       Telephone: [phone]. Fax:[phone]                                                                             PROGRESS NOTE   Reason for follow up:                                Overnight: No new events.   Update:     Subjective: "  ______________________"   Complains of:   Review of symptoms: Cardiac:  No chest pain. No dyspnea. No palpitations.  Respiratory:no cough. No dyspnea  Gastrointestinal: No diarrhea. No abdominal pain. No bleeding.     Past medical history: No updates.   Chronic conditions:  Hypertension: controlled. CHF: Stable. CAD: Stable ischemic heart disease. DM: Stable;    	  Vitals:  T(C): 36.6 (01-15-18 @ 10:15), Max: 36.7 (01-14-18 @ 22:12)  HR: 64 (01-15-18 @ 13:28) (59 - 73)  BP: 144/78 (01-15-18 @ 13:28) (131/56 - 165/78)  RR: 18 (01-15-18 @ 13:28) (18 - 18)  SpO2: 96% (01-15-18 @ 13:28) (96% - 100%)  I&O's Summary  14 Jan 2018 07:01  -  15 Slava 2018 07:00  --------------------------------------------------------  IN: 1400 mL / OUT: 1225 mL / NET: 175 mL  15 Slava 2018 07:01  -  15 Slava 2018 16:00  --------------------------------------------------------  IN: 765 mL / OUT: 300 mL / NET: 465 mL  Weight (kg): 73 (01-11 @ 19:15), 74.8 (01-11 @ 15:11)    PHYSICAL EXAM:  Appearance: Comfortable. No acute distress  HEENT:  Head and neck: Atraumatic. Normocephalic.  Normal oral mucosa, PERRL, Neck is supple. No JVD, No carotid bruit.   Neurologic: A & O x 4, left side hemiparsis noted from bleed,  EOMI , Cranial nerves are intact.  Lymphatic: No cervical lymphadenopathy  Cardiovascular: Normal S1 S2, No murmur, rubs/gallops. No JVD, No edema  Respiratory: Lungs clear to auscultation  Gastrointestinal:  Soft, Non-tender, + BS  Lower Extremities: pp no edema, left side hemiparesis noted  Psychiatry: Patient is calm. No agitation. Mood & affect appropriate  Skin: No rashes/ ecchymoses/cyanosis/ulcers visualized on the face, hands or feet.    CURRENT MEDICATIONS:  amLODIPine   Tablet 10 milliGRAM(s) Oral daily  enalapril 10 milliGRAM(s) Oral two times a day  hydrALAZINE Injectable 10 milliGRAM(s) IV Push every 4 hours PRN  labetalol 100 milliGRAM(s) Oral three times a day  spironolactone 25 milliGRAM(s) Oral daily  pantoprazole    Tablet  dextrose 50% Injectable  dextrose 50% Injectable  dextrose 50% Injectable  insulin glargine Injectable (LANTUS)  insulin lispro (HumaLOG) corrective regimen sliding scale  insulin lispro (HumaLOG) corrective regimen sliding scale  dextrose 5%.  influenza   Vaccine  sodium bicarbonate  Infusion      LABS:	 	  CARDIAC MARKERS ( 14 Jan 2018 15:54 )  x     / <0.01 ng/mL / x     / x     / x      p-BNP 14 Jan 2018 15:54: x    , CARDIAC MARKERS ( 14 Jan 2018 05:49 )  x     / <0.01 ng/mL / x     / x     / x      p-BNP 14 Jan 2018 05:49: x    , CARDIAC MARKERS ( 13 Jan 2018 17:12 )  x     / 0.01 ng/mL / x     / x     / x      p-BNP 13 Jan 2018 17:12: x    , CARDIAC MARKERS ( 12 Jan 2018 20:08 )  x     / <0.01 ng/mL / x     / x     / x      p-BNP 12 Jan 2018 20:08: x                             13.0   8.0   )-----------( 237      ( 15 Slava 2018 06:09 )             38.3   138  |  99  |  25.0<H>  ----------------------------<  114  3.7   |  23.0  |  1.46<H>  Ca    9.1      15 Slava 2018 06:09  HgA1c: Hemoglobin A1C, Whole Blood: 10.9 %    TELEMETRY: SR 60, no overnigh concerns.      Kidney Ultrasound  Mildly atrophic right kidney.  Bilateral renal cysts.

## 2018-01-15 NOTE — CONSULT NOTE ADULT - SUBJECTIVE AND OBJECTIVE BOX
Patient is a 67y old  Male who presents with a chief complaint of stroke (2018 21:41)      HPI:  66M transferred from [facility]. PMHX of HTN He presented AMS. Head Ct showed a Right Thalamic Hemorrhagic Bleed with some Effacement of the Lateral Ventricle and surrounding Edema.  He was brought to [facility] for further management and Neurosurgical evaluation. His BP in the ER was 170 and he was placed on a Cardene Infusion. (2018 19:20)  Interim noted . No neurosurgical intervention planned . Cardiology to plan cardiac cath in am   Trend in BP better with med adjustments   CT angio w/ multiple atherosclerotic calcifications ---> No gross BRIAN noted , L Renal cysts , No adrenal lesions reported   Mild atrophic right kidney, no hydro on renal sonogram done today   Aldactone recently added . Pulse 60   Cardiac enzymes were negative     PAST MEDICAL & SURGICAL HISTORY:  No pertinent past medical history  HTN (hypertension)  CVA (cerebral vascular accident)  Diabetes  No significant past surgical history  No significant past surgical history      FAMILY HISTORY:      Social History:    MEDICATIONS  (STANDING):  amLODIPine   Tablet 10 milliGRAM(s) Oral daily  dextrose 5%. 1000 milliLiter(s) (50 mL/Hr) IV Continuous <Continuous>  dextrose 50% Injectable 12.5 Gram(s) IV Push once  dextrose 50% Injectable 25 Gram(s) IV Push once  dextrose 50% Injectable 25 Gram(s) IV Push once  enalapril 10 milliGRAM(s) Oral two times a day  influenza   Vaccine 0.5 milliLiter(s) IntraMuscular once  insulin glargine Injectable (LANTUS) 14 Unit(s) SubCutaneous at bedtime  insulin lispro (HumaLOG) corrective regimen sliding scale   SubCutaneous three times a day before meals  insulin lispro (HumaLOG) corrective regimen sliding scale   SubCutaneous at bedtime  labetalol 100 milliGRAM(s) Oral three times a day  pantoprazole    Tablet 40 milliGRAM(s) Oral before breakfast  sodium chloride 0.45%. 1000 milliLiter(s) (75 mL/Hr) IV Continuous <Continuous>  spironolactone 25 milliGRAM(s) Oral daily    MEDICATIONS  (PRN):  aluminum hydroxide/magnesium hydroxide/simethicone Suspension 30 milliLiter(s) Oral every 4 hours PRN Dyspepsia  dextrose Gel 1 Dose(s) Oral once PRN Blood Glucose LESS THAN 70 milliGRAM(s)/deciliter  glucagon  Injectable 1 milliGRAM(s) IntraMuscular once PRN Glucose LESS THAN 70 milligrams/deciliter  hydrALAZINE Injectable 10 milliGRAM(s) IV Push every 4 hours PRN sbp>160  morphine  - Injectable 2 milliGRAM(s) IV Push every 4 hours PRN Severe Pain (7 - 10)  oxyCODONE    5 mG/acetaminophen 325 mG 1 Tablet(s) Oral every 4 hours PRN Moderate Pain (4 - 6)      Allergies    No Known Allergies    Intolerances      Vital Signs Last 24 Hrs  T(C): 36.6 (15 Slava 2018 10:15), Max: 36.8 (2018 15:49)  T(F): 97.9 (15 Slava 2018 10:15), Max: 98.2 (2018 15:49)  HR: 60 (15 Slava 2018 11:28) (59 - 73)  BP: 140/64 (15 Slava 2018 11:28) (131/56 - 165/78)  BP(mean): --  RR: 18 (15 Slava 2018 11:28) (18 - 18)  SpO2: 97% (15 Slava 2018 11:28) (97% - 100%)  Daily     Daily Weight in k.1 (15 Slava 2018 06:08)  I&O's Detail    2018 07:  -  15 Slava 2018 07:00  --------------------------------------------------------  IN:    Oral Fluid: 500 mL    sodium chloride 0.45%.: 900 mL  Total IN: 1400 mL    OUT:    Voided: 1225 mL  Total OUT: 1225 mL    Total NET: 175 mL      15 Slava 2018 07:  -  15 Slava 2018 13:28  --------------------------------------------------------  IN:    Oral Fluid: 120 mL    sodium chloride 0.45%.: 525 mL  Total IN: 645 mL    OUT:    Voided: 300 mL  Total OUT: 300 mL    Total NET: 345 mL        I&O's Summary    2018 07:  -  15 Slava 2018 07:00  --------------------------------------------------------  IN: 1400 mL / OUT: 1225 mL / NET: 175 mL    15 Slava 2018 07:01  -  15 Slava 2018 13:28  --------------------------------------------------------  IN: 645 mL / OUT: 300 mL / NET: 345 mL        PHYSICAL EXAM:    GENERAL: NAD, comfortable flat   HEAD:  Atraumatic, No edema   NECK: Supple, No JVD,   NERVOUS SYSTEM:  L hemiplegia   CHEST/LUNG: Clear / EAE .  HEART: Regular rate and rhythm; No rub   ABDOMEN: Soft, Nontender, Nondistended;   EXTREMITIES:  No edema           LABS:                        13.0   8.0   )-----------( 237      ( 15 Slava 2018 06:09 )             38.3     -15    138  |  99  |  25.0<H>  ----------------------------<  114  3.7   |  23.0  |  1.46<H>    Ca    9.1      15 Slava 2018 06:09      Summary:   1. Left ventricular ejection fraction, by visual estimation, is 30 to   35%.   2. Moderately decreased global left ventricular systolic function.   3. There is mild concentric left ventricular hypertrophy.   4. Mild to moderate mitral annular calcification.   5. Thickening of the anterior and posterior mitral valve leaflets.   6. Sclerotic aortic valve with normal opening.    MD Graham Electronically signed on 2018 at 1:57:37 PM                   INTERPRETATION:  CLINICAL INFORMATION: Acute renal injury. Evaluate for   obstruction.    COMPARISON: None available.    TECHNIQUE: Sonography of the kidneys and bladder.     FINDINGS:    Right kidney:  8.5 cm. There are cysts including a 1.5 cm cyst with thin   septations.    Left kidney:  12.0 cm. There are cysts measuring up to 7.6 cm. The   largest cyst contains a thin septation.    Urinary bladder: Within normal limits.    Gallstones are incidentally noted.    IMPRESSION:     Mildly atrophic right kidney.    Bilateral renal cysts.            RADIOLOGY & ADDITIONAL TESTS:

## 2018-01-16 DIAGNOSIS — I10 ESSENTIAL (PRIMARY) HYPERTENSION: ICD-10-CM

## 2018-01-16 DIAGNOSIS — I42.9 CARDIOMYOPATHY, UNSPECIFIED: ICD-10-CM

## 2018-01-16 LAB
ANION GAP SERPL CALC-SCNC: 14 MMOL/L — SIGNIFICANT CHANGE UP (ref 5–17)
BUN SERPL-MCNC: 23 MG/DL — HIGH (ref 8–20)
CALCIUM SERPL-MCNC: 9.1 MG/DL — SIGNIFICANT CHANGE UP (ref 8.6–10.2)
CHLORIDE SERPL-SCNC: 99 MMOL/L — SIGNIFICANT CHANGE UP (ref 98–107)
CO2 SERPL-SCNC: 27 MMOL/L — SIGNIFICANT CHANGE UP (ref 22–29)
CORTIS AM PEAK SERPL-MCNC: 10.9 UG/DL — SIGNIFICANT CHANGE UP (ref 6–18.4)
CREAT SERPL-MCNC: 1.44 MG/DL — HIGH (ref 0.5–1.3)
GLUCOSE BLDC GLUCOMTR-MCNC: 119 MG/DL — HIGH (ref 70–99)
GLUCOSE BLDC GLUCOMTR-MCNC: 126 MG/DL — HIGH (ref 70–99)
GLUCOSE BLDC GLUCOMTR-MCNC: 274 MG/DL — HIGH (ref 70–99)
GLUCOSE SERPL-MCNC: 125 MG/DL — HIGH (ref 70–115)
HCT VFR BLD CALC: 36.9 % — LOW (ref 42–52)
HGB BLD-MCNC: 12.8 G/DL — LOW (ref 14–18)
MAGNESIUM SERPL-MCNC: 2.2 MG/DL — SIGNIFICANT CHANGE UP (ref 1.6–2.6)
MCHC RBC-ENTMCNC: 29 PG — SIGNIFICANT CHANGE UP (ref 27–31)
MCHC RBC-ENTMCNC: 34.7 G/DL — SIGNIFICANT CHANGE UP (ref 32–36)
MCV RBC AUTO: 83.5 FL — SIGNIFICANT CHANGE UP (ref 80–94)
PLATELET # BLD AUTO: 234 K/UL — SIGNIFICANT CHANGE UP (ref 150–400)
POTASSIUM SERPL-MCNC: 3.6 MMOL/L — SIGNIFICANT CHANGE UP (ref 3.5–5.3)
POTASSIUM SERPL-SCNC: 3.6 MMOL/L — SIGNIFICANT CHANGE UP (ref 3.5–5.3)
RBC # BLD: 4.42 M/UL — LOW (ref 4.6–6.2)
RBC # FLD: 12.9 % — SIGNIFICANT CHANGE UP (ref 11–15.6)
SODIUM SERPL-SCNC: 140 MMOL/L — SIGNIFICANT CHANGE UP (ref 135–145)
T4 FREE SERPL-MCNC: 1.2 NG/DL — SIGNIFICANT CHANGE UP (ref 0.9–1.8)
TSH SERPL-MCNC: 3.48 UIU/ML — SIGNIFICANT CHANGE UP (ref 0.27–4.2)
WBC # BLD: 6.8 K/UL — SIGNIFICANT CHANGE UP (ref 4.8–10.8)
WBC # FLD AUTO: 6.8 K/UL — SIGNIFICANT CHANGE UP (ref 4.8–10.8)

## 2018-01-16 PROCEDURE — 93567 NJX CAR CTH SPRVLV AORTGRPHY: CPT

## 2018-01-16 PROCEDURE — 93458 L HRT ARTERY/VENTRICLE ANGIO: CPT | Mod: 26

## 2018-01-16 PROCEDURE — 99152 MOD SED SAME PHYS/QHP 5/>YRS: CPT

## 2018-01-16 PROCEDURE — 99233 SBSQ HOSP IP/OBS HIGH 50: CPT

## 2018-01-16 PROCEDURE — 99232 SBSQ HOSP IP/OBS MODERATE 35: CPT

## 2018-01-16 RX ORDER — FENTANYL CITRATE 50 UG/ML
25 INJECTION INTRAVENOUS ONCE
Refills: 0 | Status: DISCONTINUED | OUTPATIENT
Start: 2018-01-16 | End: 2018-01-16

## 2018-01-16 RX ORDER — FUROSEMIDE 40 MG
40 TABLET ORAL ONCE
Refills: 0 | Status: COMPLETED | OUTPATIENT
Start: 2018-01-16 | End: 2018-01-16

## 2018-01-16 RX ORDER — NITROGLYCERIN 6.5 MG
1 CAPSULE, EXTENDED RELEASE ORAL ONCE
Refills: 0 | Status: COMPLETED | OUTPATIENT
Start: 2018-01-16 | End: 2018-01-16

## 2018-01-16 RX ORDER — LABETALOL HCL 100 MG
10 TABLET ORAL ONCE
Refills: 0 | Status: COMPLETED | OUTPATIENT
Start: 2018-01-16 | End: 2018-01-16

## 2018-01-16 RX ORDER — ATORVASTATIN CALCIUM 80 MG/1
40 TABLET, FILM COATED ORAL AT BEDTIME
Refills: 0 | Status: DISCONTINUED | OUTPATIENT
Start: 2018-01-16 | End: 2018-01-26

## 2018-01-16 RX ORDER — NITROGLYCERIN 6.5 MG
5 CAPSULE, EXTENDED RELEASE ORAL
Qty: 50 | Refills: 0 | Status: DISCONTINUED | OUTPATIENT
Start: 2018-01-16 | End: 2018-01-17

## 2018-01-16 RX ADMIN — Medication 1: at 21:41

## 2018-01-16 RX ADMIN — Medication 1200 MILLIGRAM(S): at 06:57

## 2018-01-16 RX ADMIN — OXYCODONE AND ACETAMINOPHEN 1 TABLET(S): 5; 325 TABLET ORAL at 00:00

## 2018-01-16 RX ADMIN — Medication 1 INCH(S): at 18:39

## 2018-01-16 RX ADMIN — SPIRONOLACTONE 25 MILLIGRAM(S): 25 TABLET, FILM COATED ORAL at 07:15

## 2018-01-16 RX ADMIN — Medication 10 MILLIGRAM(S): at 13:40

## 2018-01-16 RX ADMIN — FENTANYL CITRATE 25 MICROGRAM(S): 50 INJECTION INTRAVENOUS at 14:30

## 2018-01-16 RX ADMIN — Medication 100 MILLIGRAM(S): at 21:40

## 2018-01-16 RX ADMIN — Medication 100 MILLIGRAM(S): at 17:14

## 2018-01-16 RX ADMIN — Medication 40 MILLIGRAM(S): at 17:29

## 2018-01-16 RX ADMIN — PANTOPRAZOLE SODIUM 40 MILLIGRAM(S): 20 TABLET, DELAYED RELEASE ORAL at 06:57

## 2018-01-16 RX ADMIN — Medication 10 MILLIGRAM(S): at 06:57

## 2018-01-16 RX ADMIN — ATORVASTATIN CALCIUM 40 MILLIGRAM(S): 80 TABLET, FILM COATED ORAL at 21:40

## 2018-01-16 RX ADMIN — AMLODIPINE BESYLATE 10 MILLIGRAM(S): 2.5 TABLET ORAL at 06:57

## 2018-01-16 RX ADMIN — FENTANYL CITRATE 25 MICROGRAM(S): 50 INJECTION INTRAVENOUS at 16:37

## 2018-01-16 RX ADMIN — INSULIN GLARGINE 14 UNIT(S): 100 INJECTION, SOLUTION SUBCUTANEOUS at 21:41

## 2018-01-16 RX ADMIN — Medication 100 MILLIGRAM(S): at 06:57

## 2018-01-16 RX ADMIN — Medication 10 MILLIGRAM(S): at 21:40

## 2018-01-16 RX ADMIN — Medication 1.5 MICROGRAM(S)/MIN: at 18:24

## 2018-01-16 RX ADMIN — Medication 1 INCH(S): at 17:30

## 2018-01-16 RX ADMIN — Medication 10 MILLIGRAM(S): at 14:20

## 2018-01-16 RX ADMIN — Medication 0.1 MILLIGRAM(S): at 17:29

## 2018-01-16 NOTE — PROGRESS NOTE ADULT - ASSESSMENT
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 Rob/Dr. Karimi   Add statin for CAD done

## 2018-01-16 NOTE — PROGRESS NOTE ADULT - SUBJECTIVE AND OBJECTIVE BOX
S/P cath  Official report pending  Plan medical management  Right groin sheath to pull by RN  's hydralazine and labetolol given for sheath pull. S/P cath  70% mid LAD  Official report pending  Plan medical management for now  Possible PCI for LAD after resolution of CVA  Right groin sheath to pull by RN  's hydralazine and labetolol given for sheath pull.  Fentanyl given for groin pain during de-sheathing.

## 2018-01-16 NOTE — PROGRESS NOTE ADULT - SUBJECTIVE AND OBJECTIVE BOX
called by nursing to evaluate persistent SBP>190  Will give Lasix 40 IVP and pt due his labetaolol.  No neuro deficits No ha called by nursing to evaluate persistent SBP>190  Will give Lasix 40 IVP and pt due his labetaolol.  No neuro deficits No ha  1800 SBP>187 x3 despite labetaolol, clonidine, NTG paste, lasix  will place on iv tridil for BP management  DW Dr. Karimi  If no response, will tx to ICU called by nursing to evaluate persistent SBP>190  Will give Lasix 40 IVP and pt due his labetaolol.  No neuro deficits No ha  1800 SBP>187 x3 despite labetaolol, clonidine, NTG paste, lasix  will place on iv tridil for BP management  DW Dr. Karimi  If no response, will tx to ICU  TRidil off -170          REVIEW OF SYSTEMS:  Denies SOB, CP, NV, HA, dizziness, palpitations, site pain    PHYSICAL EXAM: A&Ox3 NAD Skin warm and dry  NEURO: Speech intact +gag +swallow Tongue midline RANDALL Left sided weakness LUE<RUE   NECK: No JVD, trachea midline. Eupneic  HEART: RRR SR on tele no ectopy  PULMONARY:  CTA alise  ABDOMEN: Soft nontender X4 +BS Vdg/drinking  EXTREMITIES: RFA site: No bleed, hematoma, pain, ecchymosis or swelling Rt DP/PT+

## 2018-01-16 NOTE — PROGRESS NOTE ADULT - SUBJECTIVE AND OBJECTIVE BOX
NEPHROLOGY INTERVAL HPI/OVERNIGHT EVENTS:    Comfortable flat   BP better   No new complaints   For cath today     MEDICATIONS  (STANDING):  acetylcysteine  Oral Solution 1200 milliGRAM(s) Oral two times a day  amLODIPine   Tablet 10 milliGRAM(s) Oral daily  dextrose 5%. 1000 milliLiter(s) (50 mL/Hr) IV Continuous <Continuous>  dextrose 50% Injectable 12.5 Gram(s) IV Push once  dextrose 50% Injectable 25 Gram(s) IV Push once  dextrose 50% Injectable 25 Gram(s) IV Push once  enalapril 10 milliGRAM(s) Oral two times a day  influenza   Vaccine 0.5 milliLiter(s) IntraMuscular once  insulin glargine Injectable (LANTUS) 14 Unit(s) SubCutaneous at bedtime  insulin lispro (HumaLOG) corrective regimen sliding scale   SubCutaneous three times a day before meals  insulin lispro (HumaLOG) corrective regimen sliding scale   SubCutaneous at bedtime  labetalol 100 milliGRAM(s) Oral three times a day  pantoprazole    Tablet 40 milliGRAM(s) Oral before breakfast  sodium bicarbonate  Infusion 0.154 mEq/kG/Hr (75 mL/Hr) IV Continuous <Continuous>  spironolactone 25 milliGRAM(s) Oral daily    MEDICATIONS  (PRN):  aluminum hydroxide/magnesium hydroxide/simethicone Suspension 30 milliLiter(s) Oral every 4 hours PRN Dyspepsia  dextrose Gel 1 Dose(s) Oral once PRN Blood Glucose LESS THAN 70 milliGRAM(s)/deciliter  glucagon  Injectable 1 milliGRAM(s) IntraMuscular once PRN Glucose LESS THAN 70 milligrams/deciliter  hydrALAZINE Injectable 10 milliGRAM(s) IV Push every 4 hours PRN sbp>160  morphine  - Injectable 2 milliGRAM(s) IV Push every 4 hours PRN Severe Pain (7 - 10)  oxyCODONE    5 mG/acetaminophen 325 mG 1 Tablet(s) Oral every 4 hours PRN Moderate Pain (4 - 6)      Allergies    No Known Allergies    Intolerances      Vital Signs Last 24 Hrs  T(C): 36.7 (2018 10:25), Max: 36.9 (15 Slava 2018 20:43)  T(F): 98.1 (2018 10:25), Max: 98.4 (15 Slava 2018 20:43)  HR: 63 (2018 10:25) (55 - 64)  BP: 154/71 (2018 10:25) (144/78 - 169/76)  BP(mean): --  RR: 17 (2018 10:25) (17 - 18)  SpO2: 100% (2018 10:25) (95% - 100%)  Daily     Daily Weight in k.8 (2018 06:00)  I&O's Detail    15 Slava 2018 07:01  -  2018 07:00  --------------------------------------------------------  IN:    Oral Fluid: 390 mL    sodium bicarbonate  Infusion: 900 mL    sodium chloride 0.45%: 525 mL  Total IN: 1815 mL    OUT:    Voided: 1000 mL  Total OUT: 1000 mL    Total NET: 815 mL      2018 07:  -  2018 11:29  --------------------------------------------------------  IN:    sodium bicarbonate  Infusion: 150 mL  Total IN: 150 mL    OUT:  Total OUT: 0 mL    Total NET: 150 mL        I&O's Summary    15 Slava 2018 07:  -  2018 07:00  --------------------------------------------------------  IN: 1815 mL / OUT: 1000 mL / NET: 815 mL    2018 07:01  -  2018 11:29  --------------------------------------------------------  IN: 150 mL / OUT: 0 mL / NET: 150 mL        PHYSICAL EXAM:  GENERAL: NAD, comfortable flat   HEAD:  Atraumatic, No edema   NECK: Supple, No JVD,   NERVOUS SYSTEM:  L hemiplegia   CHEST/LUNG: Clear / EAE .  HEART: Regular rate and rhythm; No rub   ABDOMEN: Soft, Nontender, Nondistended;   EXTREMITIES:  No edema     LABS:                        12.8   6.8   )-----------( 234      ( 2018 06:25 )             36.9         140  |  99  |  23.0<H>  ----------------------------<  125<H>  3.6   |  27.0  |  1.44<H>    Ca    9.1      2018 06:31  Mg     2.2               Magnesium, Serum: 2.2 mg/dL ( @ 06:31)          RADIOLOGY & ADDITIONAL TESTS:

## 2018-01-16 NOTE — PROGRESS NOTE ADULT - SUBJECTIVE AND OBJECTIVE BOX
Patient in bed, laying across his bed, uncomfortable.   Would like to be moved, feels pain in his back.  Assisted with bed mobility and did require total A.  Continues to have weakness of the rleft UE and LE, but appears worse.   Patient more fatigued than last visit.   Course complicated by chest pain, which EKG was unchanged, but had D Dimer elevated and now planned for cath.     FUNCTIONAL PROGRESS  1/14  Bed Mobility: Scooting/Bridging:     · Level of [place]	maximum assist (25% patients effort)	  · Physical Assist/Nonphysical Assist	2 person assist	    Bed Mobility: Sit to Supine:     · Level of [place]	maximum assist (25% patients effort)	  · Physical Assist/Nonphysical Assist	2 person assist	    Bed Mobility: Supine to Sit:     · Level of [place]	maximum assist (25% patients effort)	  · Physical Assist/Nonphysical Assist	2 person assist; verbal cues; nonverbal cues (demo/gestures)	  · Assistive Device	bed rails	      REVIEW OF SYSTEMS  Constitutional - No fever,  +fatigue  Neurological - +loss of strength    VITALS  T(C): 36.9 (01-16-18 @ 06:54), Max: 36.9 (01-15-18 @ 20:43)  HR: 57 (01-16-18 @ 06:54) (57 - 73)  BP: 145/84 (01-16-18 @ 06:54) (131/56 - 169/76)  RR: 18 (01-16-18 @ 06:54) (18 - 18)  SpO2: 95% (01-16-18 @ 06:54) (95% - 99%)  Wt(kg): --    MEDICATIONS   acetylcysteine  Oral Solution 1200 milliGRAM(s) two times a day  aluminum hydroxide/magnesium hydroxide/simethicone Suspension 30 milliLiter(s) every 4 hours PRN  amLODIPine   Tablet 10 milliGRAM(s) daily  dextrose 5%. 1000 milliLiter(s) <Continuous>  dextrose 50% Injectable 12.5 Gram(s) once  dextrose 50% Injectable 25 Gram(s) once  dextrose 50% Injectable 25 Gram(s) once  dextrose Gel 1 Dose(s) once PRN  enalapril 10 milliGRAM(s) two times a day  glucagon  Injectable 1 milliGRAM(s) once PRN  hydrALAZINE Injectable 10 milliGRAM(s) every 4 hours PRN  influenza   Vaccine 0.5 milliLiter(s) once  insulin glargine Injectable (LANTUS) 14 Unit(s) at bedtime  insulin lispro (HumaLOG) corrective regimen sliding scale   three times a day before meals  insulin lispro (HumaLOG) corrective regimen sliding scale   at bedtime  labetalol 100 milliGRAM(s) three times a day  morphine  - Injectable 2 milliGRAM(s) every 4 hours PRN  oxyCODONE    5 mG/acetaminophen 325 mG 1 Tablet(s) every 4 hours PRN  pantoprazole    Tablet 40 milliGRAM(s) before breakfast  sodium bicarbonate  Infusion 0.154 mEq/kG/Hr <Continuous>  spironolactone 25 milliGRAM(s) daily      RECENT LABS/IMAGING  CBC Full  -  ( 16 Jan 2018 06:25 )  WBC Count : 6.8 K/uL  Hemoglobin : 12.8 g/dL  Hematocrit : 36.9 %  Platelet Count - Automated : 234 K/uL  Mean Cell Volume : 83.5 fl  Mean Cell Hemoglobin : 29.0 pg  Mean Cell Hemoglobin Concentration : 34.7 g/dL  Auto Neutrophil # : x  Auto Lymphocyte # : x  Auto Monocyte # : x  Auto Eosinophil # : x  Auto Basophil # : x  Auto Neutrophil % : x  Auto Lymphocyte % : x  Auto Monocyte % : x  Auto Eosinophil % : x  Auto Basophil % : x    01-16    140  |  99  |  23.0<H>  ----------------------------<  125<H>  3.6   |  27.0  |  1.44<H>    Ca    9.1      16 Jan 2018 06:31  Mg     2.2     01-16      ----------------------------------------------------------------  PHYSICAL EXAM  Constitutional - NAD, Uncomfortable  Neurologic Exam -                    Cognitive - Awake, Alert, AAO to self, situation     Communication - +dysarthria     Cranial Nerves - loss left nasolabial fold     Motor                     LEFT    UE - ShAB 1/5, EF 1/5, EE 1/5, WE 1/5,  1/5                    LEFT    LE - HF 1/5, KE 1/5, DF 0/5, PF 0/5     Sensory - decreased sensation left UE and LE      Psychiatric - Flat Affect, depressed, Fatigued  ----------------------------------------------------------------------------------------  ASSESSMENT/PLAN  67M with right thalamic and corona radiata ICH with intraventricular extension likely hypertensive with left hemiparesis, dysphagia, functional, gait and ADL impairments   Pain - Percocet, Would recommend DC morphine, patient more fatigued than usual. Source of pain is related to immobility and should be OOB throughout the day.   Dysphagia - MS/Nectar   DVT PPX - SCDs  Rehab - Medically being optimized. Recommend ACUTE inpatient rehabilitation for the functional deficits consisting of 3 hours of therapy/day & 24 hour RN/daily PMR physician for comorbid medical management.

## 2018-01-16 NOTE — PROGRESS NOTE ADULT - ASSESSMENT
CKD - DM, HTN  complicated by L thalamic bleed , R/O Secondary HTN . BP Under better control   + new cardiomyopathy   For cardiac cath today     CT angio shows no gross BRIAN or adrenal lesions   Aldactone added ( this will alter ability to detect Conn's syndrome )     Recommend   Mucomyst bid x 4 doses ---> Ongoing   IV bicarb 12 h pre and post cath ( see orders ) ---> Ongoing   F/U UDS , cortisol, TFT's, [place]/PRA , PMN/ NMN   UA and Urine P/C  Watch K+ on ACE and Aldactone combination         Mild complex L renal cyst on sono today ---> No enhancement on recent CT angio     Follow up results of cath and labs in am

## 2018-01-16 NOTE — PROGRESS NOTE ADULT - SUBJECTIVE AND OBJECTIVE BOX
PA note:    Called by nurse Iv site right hand infiltrated while IVF were running.   Right hand is swollen, with minor tenderness, good cap refill and NF.  A: IV infiltrate   P: Warm soaks and elevetion PA note:    Called by nurse Iv site right hand infiltrated while IVF were running.   Right hand is swollen, with minor tenderness, good cap refill and NF.  A: IV infiltrate   P: Warm soaks and elevation

## 2018-01-16 NOTE — PROGRESS NOTE ADULT - PROBLEM SELECTOR PLAN 1
Coronary angiogram with moderate to severe proximal LAD disease. CM out of proportion to CAD. Plan for medical management.   Will add coreg 6.25mg po bid.

## 2018-01-16 NOTE — PROGRESS NOTE ADULT - ASSESSMENT
This is 66M with PMHX of HTN transfer from [facility], presented with AMS. Head Ct showed a Right Thalamic Hemorrhagic Bleed with some Effacement of the Lateral Ventricle and surrounding Edema.  He was brought to [facility] for further management and Neurosurgical evaluation. On admission BP was 170 and he was placed on a Cardene Infusion and admitted to ICU. He was seen by NS, repeat CT head No change right thalamic intracerebral hematoma since the   prior day. No evidence of midline shift on the current study. Mild persistent surrounding edema and mass effect in the right lateral ventricle, per NS - No acute neurosurgical intervention warranted at this time. Pt is downgraded to hospitalist office. Pt report chest pain, EKG unchanged, troponin negative, seen by cardiology, TTE showed LVEF of 30-35%, CTA negative for PE, positive for atherosclerotic calcicfication and mural thrombus throughout aorta,   No thoracic aortic aneurysm or dissection, discussed with Dr. Forman, will do diagnostic cardiac cath tomorrow, no AC/aspirin  due to recent ICH.     A/P    >ICH with left sided hemiplegia due to uncontrolled HTN   appreciate NS input - No acute neurosurgical intervention warranted at this time  repeat CT head stable    neuroc check q6h   Neurology consult appreciated   appreciate speech eval - on dysphagia diet - will get repeat eval to see if we can advance diet   control BP  PPI  PT/OT, Rehab - will likely need acute rehab     >HTN emergency - improving   c/w labetalol 100 TID,  amlodipine 10, enalapril 10 bid  c/w Aldactone 25   hydralazine prn     >Chest pain  appreciate cardiology input, schedule for diagnostic cath today   TTE showed LVEF of 30-35%, CTA negative for PE, positive for atherosclerotic calcicfication and mural thrombus throughout aorta,   No thoracic aortic aneurysm or dissection,   no aspirin or AC due to recent ICH.   renal consult appreciated, IVF and mucomyst as per renal     >DM - A1c 10.9   , c/w lantus 14 units plus sliding scale     >ALIVIA - stable   renal consult appreciated, IVf and mucomyst as per renal   renal u/s - mildly atrophic right kidney and b/l renal cysts     >DVT PPX - SCD

## 2018-01-16 NOTE — PROGRESS NOTE ADULT - SUBJECTIVE AND OBJECTIVE BOX
[place] [facility]                                                        Office: [address] [address]                                                       Telephone: [phone]. Fax: [phone]      CC: shortness of breath    INTERVAL HISTORY: improved symptoms.     MEDICATIONS  (STANDING):  acetylcysteine  Oral Solution 1200 milliGRAM(s) Oral two times a day  amLODIPine   Tablet 10 milliGRAM(s) Oral daily  dextrose 5%. 1000 milliLiter(s) (50 mL/Hr) IV Continuous <Continuous>  dextrose 50% Injectable 12.5 Gram(s) IV Push once  dextrose 50% Injectable 25 Gram(s) IV Push once  dextrose 50% Injectable 25 Gram(s) IV Push once  enalapril 10 milliGRAM(s) Oral two times a day  fentaNYL    Injectable 25 MICROGram(s) IV Push once  influenza   Vaccine 0.5 milliLiter(s) IntraMuscular once  insulin glargine Injectable (LANTUS) 14 Unit(s) SubCutaneous at bedtime  insulin lispro (HumaLOG) corrective regimen sliding scale   SubCutaneous three times a day before meals  insulin lispro (HumaLOG) corrective regimen sliding scale   SubCutaneous at bedtime  labetalol 100 milliGRAM(s) Oral three times a day  pantoprazole    Tablet 40 milliGRAM(s) Oral before breakfast  sodium bicarbonate  Infusion 0.154 mEq/kG/Hr (75 mL/Hr) IV Continuous <Continuous>  spironolactone 25 milliGRAM(s) Oral daily    ROS: All others negative     PHYSICAL EXAM:  T(C): 36.7 (01-16-18 @ 10:25), Max: 36.9 (01-15-18 @ 20:43)  HR: 66 (01-16-18 @ 15:15) (55 - 71)  BP: 197/91 (01-16-18 @ 15:15) (145/84 - 201/89)  RR: 18 (01-16-18 @ 15:15) (17 - 19)  SpO2: 99% (01-16-18 @ 15:15) (95% - 100%)  Wt(kg): --  I&O's Summary    15 Slava 2018 07:01  -  16 Jan 2018 07:00  --------------------------------------------------------  IN: 1815 mL / OUT: 1000 mL / NET: 815 mL    16 Jan 2018 07:01  -  16 Jan 2018 15:34  --------------------------------------------------------  IN: 150 mL / OUT: 0 mL / NET: 150 mL      Appearance: Normal	  HEENT:   Normal oral mucosa, PERRL, EOMI	  Lymphatic: No lymphadenopathy  Cardiovascular: Normal S1 S2, No JVD, No murmurs, No edema  Respiratory: Lungs clear to auscultation	  Psychiatry: A & O x 3, Mood & affect appropriate  Gastrointestinal:  Soft, Non-tender, + BS	  Skin: No rashes, No ecchymoses, No cyanosis  Neurologic:left upper and lower extremity limited.   Extremities: Normal range of motion, No clubbing, cyanosis or edema  Vascular: Peripheral pulses palpable 2+ bilaterally    TELEMETRY: Sinus. 	      LABS:	 	                        12.8   6.8   )-----------( 234      ( 16 Jan 2018 06:25 )             36.9     01-16    140  |  99  |  23.0<H>  ----------------------------<  125<H>  3.6   |  27.0  |  1.44<H>    Ca    9.1      16 Jan 2018 06:31  Mg     2.2     01-16

## 2018-01-16 NOTE — PROGRESS NOTE ADULT - SUBJECTIVE AND OBJECTIVE BOX
Internal Medicine Hospitalist - Dr. Trev MOYER    [id number]    67y      Male    Patient is a 67y old  Male who presents with a chief complaint of stroke (11 Jan 2018 21:41)    INTERVAL HPI/ OVERNIGHT EVENTS: Patient is seen and examined, schedule for diagnostic cath today     REVIEW OF SYSTEMS:    Denied fever, chills, abd. pain, nausea, vomiting, chest pain, SOB, headache, dizziness    PHYSICAL EXAM:    Vital Signs Last 24 Hrs  T(C): 36.7 (16 Jan 2018 10:25), Max: 36.9 (15 Slava 2018 20:43)  T(F): 98.1 (16 Jan 2018 10:25), Max: 98.4 (15 Slava 2018 20:43)  HR: 63 (16 Jan 2018 10:25) (55 - 64)  BP: 154/71 (16 Jan 2018 10:25) (140/64 - 169/76)  BP(mean): --  RR: 17 (16 Jan 2018 10:25) (17 - 18)  SpO2: 100% (16 Jan 2018 10:25) (95% - 100%)    GENERAL: NAD  HEENT: EOMI  Neck: supple  CHEST/LUNG: CTA b/l   HEART: S1S2+ audible  ABDOMEN: Soft, Nontender, Nondistended; Bowel sounds present  EXTREMITIES:  no edema  CNS: AAO X 3, left sided hemiplegia     LABS:                        12.8   6.8   )-----------( 234      ( 16 Jan 2018 06:25 )             36.9     01-16    140  |  99  |  23.0<H>  ----------------------------<  125<H>  3.6   |  27.0  |  1.44<H>    Ca    9.1      16 Jan 2018 06:31  Mg     2.2     01-16      MEDICATIONS  (STANDING):  acetylcysteine  Oral Solution 1200 milliGRAM(s) Oral two times a day  amLODIPine   Tablet 10 milliGRAM(s) Oral daily  dextrose 5%. 1000 milliLiter(s) (50 mL/Hr) IV Continuous <Continuous>  dextrose 50% Injectable 12.5 Gram(s) IV Push once  dextrose 50% Injectable 25 Gram(s) IV Push once  dextrose 50% Injectable 25 Gram(s) IV Push once  enalapril 10 milliGRAM(s) Oral two times a day  influenza   Vaccine 0.5 milliLiter(s) IntraMuscular once  insulin glargine Injectable (LANTUS) 14 Unit(s) SubCutaneous at bedtime  insulin lispro (HumaLOG) corrective regimen sliding scale   SubCutaneous three times a day before meals  insulin lispro (HumaLOG) corrective regimen sliding scale   SubCutaneous at bedtime  labetalol 100 milliGRAM(s) Oral three times a day  pantoprazole    Tablet 40 milliGRAM(s) Oral before breakfast  sodium bicarbonate  Infusion 0.154 mEq/kG/Hr (75 mL/Hr) IV Continuous <Continuous>  spironolactone 25 milliGRAM(s) Oral daily    MEDICATIONS  (PRN):  aluminum hydroxide/magnesium hydroxide/simethicone Suspension 30 milliLiter(s) Oral every 4 hours PRN Dyspepsia  dextrose Gel 1 Dose(s) Oral once PRN Blood Glucose LESS THAN 70 milliGRAM(s)/deciliter  glucagon  Injectable 1 milliGRAM(s) IntraMuscular once PRN Glucose LESS THAN 70 milligrams/deciliter  hydrALAZINE Injectable 10 milliGRAM(s) IV Push every 4 hours PRN sbp>160  morphine  - Injectable 2 milliGRAM(s) IV Push every 4 hours PRN Severe Pain (7 - 10)  oxyCODONE    5 mG/acetaminophen 325 mG 1 Tablet(s) Oral every 4 hours PRN Moderate Pain (4 - 6)      RADIOLOGY & ADDITIONAL TEST    < from: US Renal (01.15.18 @ 12:13) >    IMPRESSION:     Mildly atrophic right kidney.    Bilateral renal cysts.    < end of copied text >

## 2018-01-16 NOTE — PROGRESS NOTE ADULT - SUBJECTIVE AND OBJECTIVE BOX
Mallampati 2  ASA 3  Pre cath for C/O chest pain and throat pain  S/P hemorraghic CVA   Patient no aspirin at this time  ?diagnostic only  Echo  < from: TTE Echo Complete w/Doppler (01.13.18 @ 14:23) >  Summary:   1. Left ventricular ejection fraction, by visual estimation, is 30 to   35%.   2. Moderately decreased global left ventricular systolic function.   3. There is mild concentric left ventricular hypertrophy.   4. Mild to moderate mitral annular calcification.   5. Thickening of the anterior and posterior mitral valve leaflets.   6. Sclerotic aortic valve with normal opening.    MD Graham Electronically signed on 1/14/2018 at 1:57:37 PM    < end of copied text >  NPO for cath ? new cardiomyopathy  EF 35%

## 2018-01-17 DIAGNOSIS — N17.9 ACUTE KIDNEY FAILURE, UNSPECIFIED: ICD-10-CM

## 2018-01-17 DIAGNOSIS — I25.119 ATHEROSCLEROTIC HEART DISEASE OF NATIVE CORONARY ARTERY WITH UNSPECIFIED ANGINA PECTORIS: ICD-10-CM

## 2018-01-17 LAB
ALDOST SERPL-MCNC: 5.8 NG/DL — SIGNIFICANT CHANGE UP
ANION GAP SERPL CALC-SCNC: 15 MMOL/L — SIGNIFICANT CHANGE UP (ref 5–17)
BUN SERPL-MCNC: 28 MG/DL — HIGH (ref 8–20)
CALCIUM SERPL-MCNC: 9.5 MG/DL — SIGNIFICANT CHANGE UP (ref 8.6–10.2)
CHLORIDE SERPL-SCNC: 95 MMOL/L — LOW (ref 98–107)
CO2 SERPL-SCNC: 26 MMOL/L — SIGNIFICANT CHANGE UP (ref 22–29)
CREAT SERPL-MCNC: 1.87 MG/DL — HIGH (ref 0.5–1.3)
GLUCOSE BLDC GLUCOMTR-MCNC: 114 MG/DL — HIGH (ref 70–99)
GLUCOSE BLDC GLUCOMTR-MCNC: 142 MG/DL — HIGH (ref 70–99)
GLUCOSE BLDC GLUCOMTR-MCNC: 181 MG/DL — HIGH (ref 70–99)
GLUCOSE BLDC GLUCOMTR-MCNC: 253 MG/DL — HIGH (ref 70–99)
GLUCOSE SERPL-MCNC: 129 MG/DL — HIGH (ref 70–115)
HCT VFR BLD CALC: 40.4 % — LOW (ref 42–52)
HGB BLD-MCNC: 13.9 G/DL — LOW (ref 14–18)
MAGNESIUM SERPL-MCNC: 2.1 MG/DL — SIGNIFICANT CHANGE UP (ref 1.6–2.6)
MCHC RBC-ENTMCNC: 29 PG — SIGNIFICANT CHANGE UP (ref 27–31)
MCHC RBC-ENTMCNC: 34.4 G/DL — SIGNIFICANT CHANGE UP (ref 32–36)
MCV RBC AUTO: 84.2 FL — SIGNIFICANT CHANGE UP (ref 80–94)
PLATELET # BLD AUTO: 226 K/UL — SIGNIFICANT CHANGE UP (ref 150–400)
POTASSIUM SERPL-MCNC: 4.2 MMOL/L — SIGNIFICANT CHANGE UP (ref 3.5–5.3)
POTASSIUM SERPL-SCNC: 4.2 MMOL/L — SIGNIFICANT CHANGE UP (ref 3.5–5.3)
RBC # BLD: 4.8 M/UL — SIGNIFICANT CHANGE UP (ref 4.6–6.2)
RBC # FLD: 13.1 % — SIGNIFICANT CHANGE UP (ref 11–15.6)
SODIUM SERPL-SCNC: 136 MMOL/L — SIGNIFICANT CHANGE UP (ref 135–145)
WBC # BLD: 12.9 K/UL — HIGH (ref 4.8–10.8)
WBC # FLD AUTO: 12.9 K/UL — HIGH (ref 4.8–10.8)

## 2018-01-17 PROCEDURE — 99233 SBSQ HOSP IP/OBS HIGH 50: CPT

## 2018-01-17 PROCEDURE — 70450 CT HEAD/BRAIN W/O DYE: CPT | Mod: 26

## 2018-01-17 RX ORDER — CARVEDILOL PHOSPHATE 80 MG/1
25 CAPSULE, EXTENDED RELEASE ORAL EVERY 12 HOURS
Refills: 0 | Status: DISCONTINUED | OUTPATIENT
Start: 2018-01-17 | End: 2018-01-26

## 2018-01-17 RX ORDER — SODIUM CHLORIDE 9 MG/ML
1000 INJECTION, SOLUTION INTRAVENOUS
Refills: 0 | Status: DISCONTINUED | OUTPATIENT
Start: 2018-01-17 | End: 2018-01-18

## 2018-01-17 RX ORDER — ENOXAPARIN SODIUM 100 MG/ML
30 INJECTION SUBCUTANEOUS DAILY
Refills: 0 | Status: DISCONTINUED | OUTPATIENT
Start: 2018-01-17 | End: 2018-01-18

## 2018-01-17 RX ADMIN — ATORVASTATIN CALCIUM 40 MILLIGRAM(S): 80 TABLET, FILM COATED ORAL at 20:49

## 2018-01-17 RX ADMIN — OXYCODONE AND ACETAMINOPHEN 1 TABLET(S): 5; 325 TABLET ORAL at 06:00

## 2018-01-17 RX ADMIN — OXYCODONE AND ACETAMINOPHEN 1 TABLET(S): 5; 325 TABLET ORAL at 19:25

## 2018-01-17 RX ADMIN — Medication 1200 MILLIGRAM(S): at 06:47

## 2018-01-17 RX ADMIN — MORPHINE SULFATE 2 MILLIGRAM(S): 50 CAPSULE, EXTENDED RELEASE ORAL at 21:05

## 2018-01-17 RX ADMIN — Medication 10 MILLIGRAM(S): at 04:15

## 2018-01-17 RX ADMIN — Medication 10 MILLIGRAM(S): at 05:24

## 2018-01-17 RX ADMIN — PANTOPRAZOLE SODIUM 40 MILLIGRAM(S): 20 TABLET, DELAYED RELEASE ORAL at 05:24

## 2018-01-17 RX ADMIN — SPIRONOLACTONE 25 MILLIGRAM(S): 25 TABLET, FILM COATED ORAL at 05:24

## 2018-01-17 RX ADMIN — SODIUM CHLORIDE 60 MILLILITER(S): 9 INJECTION, SOLUTION INTRAVENOUS at 15:07

## 2018-01-17 RX ADMIN — CARVEDILOL PHOSPHATE 25 MILLIGRAM(S): 80 CAPSULE, EXTENDED RELEASE ORAL at 18:33

## 2018-01-17 RX ADMIN — Medication 100 MILLIGRAM(S): at 13:39

## 2018-01-17 RX ADMIN — OXYCODONE AND ACETAMINOPHEN 1 TABLET(S): 5; 325 TABLET ORAL at 05:24

## 2018-01-17 RX ADMIN — ENOXAPARIN SODIUM 30 MILLIGRAM(S): 100 INJECTION SUBCUTANEOUS at 12:22

## 2018-01-17 RX ADMIN — INSULIN GLARGINE 14 UNIT(S): 100 INJECTION, SOLUTION SUBCUTANEOUS at 20:49

## 2018-01-17 RX ADMIN — OXYCODONE AND ACETAMINOPHEN 1 TABLET(S): 5; 325 TABLET ORAL at 18:32

## 2018-01-17 RX ADMIN — Medication 10 MILLIGRAM(S): at 18:33

## 2018-01-17 RX ADMIN — MORPHINE SULFATE 2 MILLIGRAM(S): 50 CAPSULE, EXTENDED RELEASE ORAL at 20:49

## 2018-01-17 RX ADMIN — Medication 3: at 18:34

## 2018-01-17 RX ADMIN — Medication 100 MILLIGRAM(S): at 05:24

## 2018-01-17 RX ADMIN — AMLODIPINE BESYLATE 10 MILLIGRAM(S): 2.5 TABLET ORAL at 05:24

## 2018-01-17 NOTE — PROGRESS NOTE ADULT - PROBLEM SELECTOR PLAN 2
labetolol increased this am, norvasc, lisinopril, prn labetolol as needed  PT/OT/speech swallow BIU Second episode after cardiac cath yesterday  Restarted all PO meds  Tridil gtt overnight now off  BP improved

## 2018-01-17 NOTE — PROGRESS NOTE ADULT - SUBJECTIVE AND OBJECTIVE BOX
Patient in bed, significantly fatigued and uncomfortable.  States he has pain in his abdomen, has not had a BM in days.  Feels distended.  Noted to have more tone in the left UE and LE.  Motor function is worsened.   He is s/p cath on 1/16 and has 70% mid LAD.   Plan is to manage medically given CVA.     FUNCTIONAL PROGRESS  Total A in bed    REVIEW OF SYSTEMS  Constitutional - No fever,  +fatigue  HEENT - No vertigo, No neck pain  Neurological - No headaches, +memory loss, +loss of strength   Skin - No rashes, No lesions   Musculoskeletal - +joint pain, No joint swelling, +muscle pain    VITALS  T(C): 36.3 (01-17-18 @ 11:27), Max: 37.7 (01-17-18 @ 00:39)  HR: 68 (01-17-18 @ 12:00) (63 - 96)  BP: 158/76 (01-17-18 @ 12:00) (123/58 - 203/100)  RR: 17 (01-17-18 @ 12:00) (16 - 32)  SpO2: 98% (01-17-18 @ 12:00) (93% - 100%)  Wt(kg): --    MEDICATIONS   aluminum hydroxide/magnesium hydroxide/simethicone Suspension 30 milliLiter(s) every 4 hours PRN  amLODIPine   Tablet 10 milliGRAM(s) daily  atorvastatin 40 milliGRAM(s) at bedtime  dextrose 5%. 1000 milliLiter(s) <Continuous>  dextrose 50% Injectable 12.5 Gram(s) once  dextrose 50% Injectable 25 Gram(s) once  dextrose 50% Injectable 25 Gram(s) once  dextrose Gel 1 Dose(s) once PRN  enalapril 10 milliGRAM(s) two times a day  enoxaparin Injectable 30 milliGRAM(s) daily  glucagon  Injectable 1 milliGRAM(s) once PRN  hydrALAZINE Injectable 10 milliGRAM(s) every 4 hours PRN  influenza   Vaccine 0.5 milliLiter(s) once  insulin glargine Injectable (LANTUS) 14 Unit(s) at bedtime  insulin lispro (HumaLOG) corrective regimen sliding scale   three times a day before meals  insulin lispro (HumaLOG) corrective regimen sliding scale   at bedtime  labetalol 100 milliGRAM(s) three times a day  morphine  - Injectable 2 milliGRAM(s) every 4 hours PRN  oxyCODONE    5 mG/acetaminophen 325 mG 1 Tablet(s) every 4 hours PRN  pantoprazole    Tablet 40 milliGRAM(s) before breakfast  spironolactone 25 milliGRAM(s) daily      RECENT LABS/IMAGING  CBC Full  -  ( 17 Jan 2018 05:21 )  WBC Count : 12.9 K/uL  Hemoglobin : 13.9 g/dL  Hematocrit : 40.4 %  Platelet Count - Automated : 226 K/uL  Mean Cell Volume : 84.2 fl  Mean Cell Hemoglobin : 29.0 pg  Mean Cell Hemoglobin Concentration : 34.4 g/dL  Auto Neutrophil # : x  Auto Lymphocyte # : x  Auto Monocyte # : x  Auto Eosinophil # : x  Auto Basophil # : x  Auto Neutrophil % : x  Auto Lymphocyte % : x  Auto Monocyte % : x  Auto Eosinophil % : x  Auto Basophil % : x    01-17    136  |  95<L>  |  28.0<H>  ----------------------------<  129<H>  4.2   |  26.0  |  1.87<H>    Ca    9.5      17 Jan 2018 05:21  Mg     2.1     01-17      -----------------------------------------  PHYSICAL EXAM  Constitutional - NAD, Uncomfortable  Neurologic Exam -                    Cognitive - Awake, Alert, AAO to self, situation     Communication - +dysarthria     Cranial Nerves - loss left nasolabial fold     Motor                     LEFT    UE - ShAB 1/5, EF 1/5, EE 1/5, WE 1/5,  1/5                    LEFT    LE - HF 1/5, KE 1/5, DF 0/5, PF 0/5    Tone - 2/4 on EF and KE	     Sensory - decreased sensation left UE and LE      Psychiatric - Fatigued  ----------------------------------------------------------------------------------------  ASSESSMENT/PLAN  67M with right thalamic and corona radiata ICH with intraventricular extension likely hypertensive with left hemiparesis, dysphagia, functional, gait and ADL impairments   Pain - Percocet, Would recommend DC morphine, patient more fatigued than usual. Source of pain is related to immobility and should be OOB throughout the day.   Dysphagia - MS/[place]   DVT PPX - SCDs, Lovenox  Rehab -  Recommend ACUTE inpatient rehabilitation for the functional deficits consisting of 3 hours of therapy/day & 24 hour RN/daily PMR physician for comorbid medical management.     Continue bedside therapy as well as OOB throughout the day with mobilization throughout the day with staff to maintain cardiopulmonary function and prevention of secondary complications related to debility.

## 2018-01-17 NOTE — PROGRESS NOTE ADULT - SUBJECTIVE AND OBJECTIVE BOX
S/P cardiac cath  70% LAD  PCI at later date will return as outpatient when CVA resolved  BP control overnight in MICU  VS stable 141/72 SR 65 97% o2 sats on 2 L nc  OK to downgrade to telemetry for further medical management  Cath site right groin benign  No bleeding no hematoma noted  Return to hospitalist service

## 2018-01-17 NOTE — PROGRESS NOTE ADULT - SUBJECTIVE AND OBJECTIVE BOX
ACCEPTANCE NOTE:    Pt downgraded from MICU to hospitalist service    CHIEF COMPLAINT/INTERVAL HISTORY:    Patient is a 67y old  Male who presents with a chief complaint of stroke (11 Jan 2018 21:41)      HPI:  66M transferred from [facility]. PMHX of HTN He presented AMS. Head Ct showed a Right Thalamic Hemorrhagic Bleed with some Effacement of the Lateral Ventricle and surrounding Edema.  He was brought to [facility] for further management and Neurosurgical evaluation. His BP in the ER was 170 and he was placed on a Cardene Infusion. (11 Jan 2018 19:20)      SUBJECTIVE & OBJECTIVE/ ROS: Pt seen and examined at bedside.  # [id number] used. Resting comfortable. c/o HA, N/V x 1 hour, abdominal pain when woken up. Poor historian.  No chest pain, palpitations, sob, light headedness/dizziness, difficulty breathing/cough, fevers/chills,diarrhea/constipation, dysuria or increased urinary frequency.     ICU Vital Signs Last 24 Hrs  T(C): 37.4 (17 Jan 2018 14:11), Max: 37.7 (17 Jan 2018 00:39)  T(F): 99.3 (17 Jan 2018 14:11), Max: 99.8 (17 Jan 2018 00:39)  HR: 76 (17 Jan 2018 14:11) (63 - 96)  BP: 152/80 (17 Jan 2018 14:11) (123/58 - 203/100)  BP(mean): 101 (17 Jan 2018 13:00) (83 - 118)  ABP: --  ABP(mean): --  RR: 18 (17 Jan 2018 14:11) (16 - 32)  SpO2: 98% (17 Jan 2018 14:11) (93% - 100%)        MEDICATIONS  (STANDING):  amLODIPine   Tablet 10 milliGRAM(s) Oral daily  atorvastatin 40 milliGRAM(s) Oral at bedtime  carvedilol 25 milliGRAM(s) Oral every 12 hours  dextrose 5%. 1000 milliLiter(s) (50 mL/Hr) IV Continuous <Continuous>  dextrose 50% Injectable 12.5 Gram(s) IV Push once  dextrose 50% Injectable 25 Gram(s) IV Push once  dextrose 50% Injectable 25 Gram(s) IV Push once  enalapril 10 milliGRAM(s) Oral two times a day  enoxaparin Injectable 30 milliGRAM(s) SubCutaneous daily  influenza   Vaccine 0.5 milliLiter(s) IntraMuscular once  insulin glargine Injectable (LANTUS) 14 Unit(s) SubCutaneous at bedtime  insulin lispro (HumaLOG) corrective regimen sliding scale   SubCutaneous three times a day before meals  insulin lispro (HumaLOG) corrective regimen sliding scale   SubCutaneous at bedtime  pantoprazole    Tablet 40 milliGRAM(s) Oral before breakfast  sodium chloride 0.45%. 1000 milliLiter(s) (60 mL/Hr) IV Continuous <Continuous>  spironolactone 25 milliGRAM(s) Oral daily    MEDICATIONS  (PRN):  aluminum hydroxide/magnesium hydroxide/simethicone Suspension 30 milliLiter(s) Oral every 4 hours PRN Dyspepsia  dextrose Gel 1 Dose(s) Oral once PRN Blood Glucose LESS THAN 70 milliGRAM(s)/deciliter  glucagon  Injectable 1 milliGRAM(s) IntraMuscular once PRN Glucose LESS THAN 70 milligrams/deciliter  hydrALAZINE Injectable 10 milliGRAM(s) IV Push every 4 hours PRN sbp>160  morphine  - Injectable 2 milliGRAM(s) IV Push every 4 hours PRN Severe Pain (7 - 10)  oxyCODONE    5 mG/acetaminophen 325 mG 1 Tablet(s) Oral every 4 hours PRN Moderate Pain (4 - 6)      LABS:                        13.9   12.9  )-----------( 226      ( 17 Jan 2018 05:21 )             40.4     01-17    136  |  95<L>  |  28.0<H>  ----------------------------<  129<H>  4.2   |  26.0  |  1.87<H>    Ca    9.5      17 Jan 2018 05:21  Mg     2.1     01-17            CAPILLARY BLOOD GLUCOSE      POCT Blood Glucose.: 114 mg/dL (17 Jan 2018 11:12)  POCT Blood Glucose.: 142 mg/dL (17 Jan 2018 06:45)  POCT Blood Glucose.: 274 mg/dL (16 Jan 2018 21:35)  POCT Blood Glucose.: 126 mg/dL (16 Jan 2018 17:23)      RECENT CULTURES:      RADIOLOGY & ADDITIONAL TESTS:      PHYSICAL EXAM:    GENERAL: NAD  HEENT: EOMI  Neck: supple  CHEST/LUNG: CTA b/l   HEART: S1S2+ audible  ABDOMEN: Soft, Nontender, Nondistended; Bowel sounds present  EXTREMITIES:  no edema  CNS: AAO X 3, left sided hemiplegia

## 2018-01-17 NOTE — PROGRESS NOTE ADULT - SUBJECTIVE AND OBJECTIVE BOX
CARDIOLOGY PROGRESS NOTE   ([facility])                             Reason for follow up:  Cardiomyopathy , hypertensive urgency                            Overnight: BP better controlled, off IV tridil    Subjective: Feels well  Review of symptoms: Cardiac:  No chest pain. No dyspnea. No palpitations.  Respiratory:no cough. No dyspnea  Gastrointestinal: No diarrhea. No abdominal pain. No bleeding.     Past medical history: No updates.     	  Vitals:  T(C): 36.8 (01-17-18 @ 08:01), Max: 37.7 (01-17-18 @ 00:39)  HR: 65 (01-17-18 @ 10:00) (63 - 96)  BP: 149/69 (01-17-18 @ 10:00) (123/58 - 203/100)  RR: 16 (01-17-18 @ 10:00) (16 - 32)  SpO2: 98% (01-17-18 @ 10:00) (93% - 100%)  Wt(kg): --  I&O's Summary    16 Jan 2018 07:01  -  17 Jan 2018 07:00  --------------------------------------------------------  IN: 295 mL / OUT: 1500 mL / NET: -1205 mL          PHYSICAL EXAM:  Appearance: Comfortable. No acute distress  HEENT:  Head and neck: Atraumatic. Normocephalic.  Normal oral mucosa  Neck: Supple, No JVD, no carotid bruit  Neurologic: A & O x 3, left sided weakness  Lymphatic: No cervical lymphadenopathy  Cardiovascular: Normal S1 S2, No murmur, rubs/gallops  Respiratory: Lungs clear to auscultation  Gastrointestinal:  Soft, Non-tender, + BS, no HSM  Lower Extremities: No edema, DP and PT +2,groin ok  Psychiatry: Patient is calm. No agitation. Mood & affect appropriate  Skin: No rashes, No ecchymoses, No cyanosis    CURRENT MEDICATIONS:  amLODIPine   Tablet 10 milliGRAM(s) Oral daily  enalapril 10 milliGRAM(s) Oral two times a day  hydrALAZINE Injectable 10 milliGRAM(s) IV Push every 4 hours PRN  labetalol 100 milliGRAM(s) Oral three times a day  spironolactone 25 milliGRAM(s) Oral daily    pantoprazole    Tablet  atorvastatin  dextrose 50% Injectable  dextrose 50% Injectable  dextrose 50% Injectable  insulin glargine Injectable (LANTUS)  insulin lispro (HumaLOG) corrective regimen sliding scale  insulin lispro (HumaLOG) corrective regimen sliding scale  dextrose 5%.  enoxaparin Injectable  influenza   Vaccine      LABS:	 	                          13.9   12.9  )-----------( 226      ( 17 Jan 2018 05:21 )             40.4     01-17    136  |  95<L>  |  28.0<H>  ----------------------------<  129<H>  4.2   |  26.0  |  1.87<H>    Ca    9.5      17 Jan 2018 05:21  Mg     2.1     01-17      proBNP:   Lipid Profile:   HgA1c: Hemoglobin A1C, Whole Blood: 10.9 %  TSH: Thyroid Stimulating Hormone, Serum: 3.48 uIU/mL      TELEMETRY: 	 No events   ECG:  	    DIAGNOSTIC TESTING:  [ ] Echocardiogram: EF 30%-35%  [ ]  Catheterization: LAD 70% proximal stenosis, for medical therapy due to recent ICH

## 2018-01-17 NOTE — PROGRESS NOTE ADULT - SUBJECTIVE AND OBJECTIVE BOX
Patient is a 67y old  Male who presents with a chief complaint of stroke (11 Jan 2018 21:41)      BRIEF HOSPITAL COURSE: 67 yom transferred from [place] after having left sided weakness found to have ICH likely hypertensive. Pt was initially managed in ICU until stable, stroke workup complete, neurology following.  Cardio decided to cath pt yesterday as part of workup and pt was NPO, post procedure he was found to be extremely hypertensive with HTN crisis and was brought to ICU overnight last night for BP mgt.  Pt was found to have a 70% midLAD lesion that will be medically managed.    Events last 24 hours: Off tridil gtt, PO meds restarted and pt now normotensive.  Will be downgraded today and signoff given to Dr Marquez.    PAST MEDICAL & SURGICAL HISTORY:  No pertinent past medical history  HTN (hypertension)  CVA (cerebral vascular accident)  Diabetes  No significant past surgical history  No significant past surgical history      Medications:    amLODIPine   Tablet 10 milliGRAM(s) Oral daily  enalapril 10 milliGRAM(s) Oral two times a day  labetalol 200 milliGRAM(s) Oral three times a day  labetalol Injectable 10 milliGRAM(s) IV Push every 1 hour PRN      HYDROmorphone  Injectable 0.5 milliGRAM(s) IV Push every 2 hours PRN    dextrose 50% Injectable 12.5 Gram(s) IV Push once  dextrose 50% Injectable 25 Gram(s) IV Push once  dextrose 50% Injectable 25 Gram(s) IV Push once  dextrose Gel 1 Dose(s) Oral once PRN  glucagon  Injectable 1 milliGRAM(s) IntraMuscular once PRN  insulin lispro (HumaLOG) corrective regimen sliding scale   SubCutaneous three times a day before meals  insulin lispro (HumaLOG) corrective regimen sliding scale   SubCutaneous at bedtime    dextrose 5%. 1000 milliLiter(s) IV Continuous <Continuous>  potassium chloride    Tablet ER 20 milliEquivalent(s) Oral once    influenza   Vaccine 0.5 milliLiter(s) IntraMuscular once      ICU Vital Signs Last 24 Hrs  T(C): 36.9 (13 Jan 2018 07:05), Max: 37.2 (13 Jan 2018 03:00)  T(F): 98.4 (13 Jan 2018 07:05), Max: 99 (13 Jan 2018 03:00)  HR: 61 (13 Jan 2018 11:00) (51 - 85)  BP: 164/72 (13 Jan 2018 11:00) (118/67 - 195/94)  BP(mean): 103 (13 Jan 2018 11:00) (84 - 138)  ABP: --  ABP(mean): --  RR: 18 (13 Jan 2018 11:00) (10 - 40)  SpO2: 97% (13 Jan 2018 11:00) (94% - 100%)          I&O's Detail    12 Jan 2018 07:01  -  13 Jan 2018 07:00  --------------------------------------------------------  IN:    IV PiggyBack: 100 mL    Oral Fluid: 240 mL  Total IN: 340 mL    OUT:    Voided: 925 mL  Total OUT: 925 mL    Total NET: -585 mL      13 Jan 2018 07:01  -  13 Jan 2018 11:49  --------------------------------------------------------  IN:    Oral Fluid: 100 mL  Total IN: 100 mL    OUT:    Voided: 200 mL  Total OUT: 200 mL    Total NET: -100 mL            LABS:                        12.1   7.5   )-----------( 250      ( 13 Jan 2018 05:47 )             35.8     01-13    137  |  99  |  30.0<H>  ----------------------------<  293<H>  3.6   |  26.0  |  1.59<H>    Ca    9.4      13 Jan 2018 05:47  Phos  3.4     01-13  Mg     2.3     01-13    TPro  8.2  /  Alb  3.9  /  TBili  0.6  /  DBili  x   /  AST  22  /  ALT  20  /  AlkPhos  153<H>  01-11      CARDIAC MARKERS ( 12 Jan 2018 20:08 )  x     / <0.01 ng/mL / x     / x     / x      CARDIAC MARKERS ( 11 Jan 2018 15:22 )  <.015 ng/mL / x     / 121 U/L / x     / 2.1 ng/mL      CAPILLARY BLOOD GLUCOSE      POCT Blood Glucose.: 292 mg/dL (13 Jan 2018 06:42)    PT/INR - ( 11 Jan 2018 15:22 )   PT: 10.8 sec;   INR: 0.99 ratio         PTT - ( 11 Jan 2018 15:22 )  PTT:33.4 sec    CULTURES:      Physical Examination:    General: No acute distress.  Alert, oriented, interactive, nonfocal    HEENT: Pupils equal, reactive to light.  Symmetric.    PULM: Clear to auscultation bilaterally, no significant sputum production    CVS: Regular rate and rhythm, no murmurs, rubs, or gallops    ABD: Soft, nondistended, nontender, normoactive bowel sounds, no masses    EXT: No edema, nontender    SKIN: Warm and well perfused, no rashes noted.    Neuro: left sided weakness, mild dysartheria, right side intac Patient is a 67y old  Male who presents with a chief complaint of stroke (11 Jan 2018 21:41)      BRIEF HOSPITAL COURSE: 67 yom transferred from [place] after having left sided weakness found to have ICH likely hypertensive. Pt was initially managed in ICU until stable, stroke workup complete, neurology following.  Cardio decided to cath pt yesterday as part of workup and pt was NPO, post procedure he was found to be extremely hypertensive with HTN crisis and was brought to ICU overnight last night for BP mgt.  Pt was found to have a 70% midLAD lesion that will be medically managed for now due to fact that he is currently not candidate for ASA/Plavix due to ICH> spoke with cardio NP and pt not on ASA/Plavix at this time due to ICH and they are aware of this, eventually they will d/w neurology/NS about when it will be safe to restart these meds.      Events last 24 hours: Off tridil gtt, PO meds restarted and pt now normotensive.  Will be downgraded today and signoff given to Dr Marquez.    PAST MEDICAL & SURGICAL HISTORY:  No pertinent past medical history  HTN (hypertension)  CVA (cerebral vascular accident)  Diabetes  No significant past surgical history  No significant past surgical history    MEDICATIONS  (STANDING):  amLODIPine   Tablet 10 milliGRAM(s) Oral daily  atorvastatin 40 milliGRAM(s) Oral at bedtime  dextrose 5%. 1000 milliLiter(s) (50 mL/Hr) IV Continuous <Continuous>  dextrose 50% Injectable 12.5 Gram(s) IV Push once  dextrose 50% Injectable 25 Gram(s) IV Push once  dextrose 50% Injectable 25 Gram(s) IV Push once  enalapril 10 milliGRAM(s) Oral two times a day  enoxaparin Injectable 30 milliGRAM(s) SubCutaneous daily  influenza   Vaccine 0.5 milliLiter(s) IntraMuscular once  insulin glargine Injectable (LANTUS) 14 Unit(s) SubCutaneous at bedtime  insulin lispro (HumaLOG) corrective regimen sliding scale   SubCutaneous three times a day before meals  insulin lispro (HumaLOG) corrective regimen sliding scale   SubCutaneous at bedtime  labetalol 100 milliGRAM(s) Oral three times a day  pantoprazole    Tablet 40 milliGRAM(s) Oral before breakfast  spironolactone 25 milliGRAM(s) Oral daily    ICU Vital Signs Last 24 Hrs  T(C): 36.8 (17 Jan 2018 08:01), Max: 37.7 (17 Jan 2018 00:39)  T(F): 98.3 (17 Jan 2018 08:01), Max: 99.8 (17 Jan 2018 00:39)  HR: 65 (17 Jan 2018 10:00) (63 - 96)  BP: 149/69 (17 Jan 2018 10:00) (123/58 - 203/100)  BP(mean): 99 (17 Jan 2018 10:00) (83 - 118)  ABP: --  ABP(mean): --  RR: 16 (17 Jan 2018 10:00) (16 - 32)  SpO2: 98% (17 Jan 2018 10:00) (93% - 100%)                 13.9   12.9   )----------(  226       ( 17 Jan 2018 05:21 )               40.4      136    |  95     |  28.0   ----------------------------<  129        ( 17 Jan 2018 05:21 )  4.2     |  26.0   |  1.87     Ca    9.5        ( 17 Jan 2018 05:21 )  Mg     2.1       ( 17 Jan 2018 05:21 )            CAPILLARY BLOOD GLUCOSE          Physical Examination:    General: No acute distress.  Alert, oriented, interactive, nonfocal    HEENT: Pupils equal, reactive to light.  Symmetric.    PULM: Course with occ rhonci on right, no significant sputum production    CVS: Regular rate and rhythm, no murmurs, rubs, or gallops    ABD: Soft, nondistended, nontender, normoactive bowel sounds, no masses    EXT: No edema, nontender    SKIN: Warm and well perfused, no rashes noted.    Neuro: left sided weakness, mild dysarthria right side intact

## 2018-01-17 NOTE — PROGRESS NOTE ADULT - ASSESSMENT
CKD- possibly d/t Htn and DM; has mildly atrophic RK also; now creatinine sl worse, could be d/t ACEI - need to watch  check 24 H urine studies once creat stable  Cardiology noted ; Needs LAD stent  Watch BP

## 2018-01-17 NOTE — PROGRESS NOTE ADULT - PROBLEM SELECTOR PLAN 1
Likely related to uncontrolled HTN  c/o HA, N/V x 1 hour, abdominal pain, will order CT head stat  NSG signed off, neurology consulted  stroke workup completed  will need acute rehab on d/c  PT/OT evals ordered  DVT ppx resumed

## 2018-01-17 NOTE — PROGRESS NOTE ADULT - PROBLEM SELECTOR PLAN 1
Likely related to uncontrolled HTN  NSG signed off neurology consulted  downgrade to q4 hr neuro cks Likely related to uncontrolled HTN  NSG signed off neurology consulted  stroke workup  will need acute rehab after this hospitalization

## 2018-01-17 NOTE — PROGRESS NOTE ADULT - PROBLEM SELECTOR PLAN 4
mid LAD 70% lesion will need eventual stents when pt cleared for ASA/Plavix  likely will be done as outpt after pt recovered  medical mgt for now.

## 2018-01-17 NOTE — PROGRESS NOTE ADULT - PROBLEM SELECTOR PROBLEM 4
Diabetes Coronary artery disease involving native coronary artery of native heart with angina pectoris

## 2018-01-17 NOTE — PROGRESS NOTE ADULT - ASSESSMENT
67 yom with HTN ICH, DM, left side weakness    Stable for transfer to the floor  Updated daughter via phone interpretor 67 yom with HTN crisis, s/p ICH due to prior episode HTN crisis, CVA, CAD

## 2018-01-17 NOTE — PROGRESS NOTE ADULT - PROBLEM SELECTOR PLAN 5
Cr in Jan 11th was wnl  Likely from Hypertensive crisis & contrast from cath  Add gentle IVF  Consider holding ACE & aldactone for now if okay with cardio  Work up per renal

## 2018-01-17 NOTE — PROGRESS NOTE ADULT - SUBJECTIVE AND OBJECTIVE BOX
Patient seen and examined in MICU  Being downgraded to med floor soon  sitting in bed  BP better controlled with PO meds    I&O's Summary    16 Jan 2018 07:01  -  17 Jan 2018 07:00  --------------------------------------------------------  IN: 295 mL / OUT: 1500 mL / NET: -1205 mL    17 Jan 2018 07:01  -  17 Jan 2018 14:49  --------------------------------------------------------  IN: 0 mL / OUT: 200 mL / NET: -200 mL        REVIEW OF SYSTEMS:    CONSTITUTIONAL: No F/C  RESPIRATORY: No cough,  No SOB  CARDIOVASCULAR: No CP/palpitations,    GASTROINTESTINAL: No abdominal pain  or NVD   GENITOURINARY: No UTI sx  NEUROLOGICAL: Denies headaches, wk/numbness  MUSCULOSKELETAL: no pains  EXTREMITIES : no swelling,    Vital Signs Last 24 Hrs  T(C): 37.4 (17 Jan 2018 14:11), Max: 37.7 (17 Jan 2018 00:39)  T(F): 99.3 (17 Jan 2018 14:11), Max: 99.8 (17 Jan 2018 00:39)  HR: 76 (17 Jan 2018 14:11) (63 - 96)  BP: 152/80 (17 Jan 2018 14:11) (123/58 - 203/100)  BP(mean): 101 (17 Jan 2018 13:00) (83 - 118)  RR: 18 (17 Jan 2018 14:11) (16 - 32)  SpO2: 98% (17 Jan 2018 14:11) (93% - 100%)    PHYSICAL EXAM:    GENERAL: NAD,   EYES:  conjunctiva and sclera clear  NECK: Supple, No JVD/Bruit  NERVOUS SYSTEM:  A/O x3,   CHEST:  No rales or rhonchi  HEART:  RRR, gr 2 murmur  ABDOMEN: Soft, NT/ND BS+  EXTREMITIES:  No Edema;  SKIN: No rashes    LABS:                          13.9   12.9  )-----------( 226      ( 17 Jan 2018 05:21 )             40.4     01-17    136  |  95<L>  |  28.0<H>  ----------------------------<  129<H>  4.2   |  26.0  |  1.87<H>    Ca    9.5      17 Jan 2018 05:21  Mg     2.1     01-17        MEDICATIONS  (STANDING):  aluminum hydroxide/magnesium hydroxide/simethicone Suspension PRN  amLODIPine   Tablet  atorvastatin  carvedilol  dextrose 5%.  dextrose 50% Injectable  dextrose 50% Injectable  dextrose 50% Injectable  dextrose Gel PRN  enalapril  enoxaparin Injectable  glucagon  Injectable PRN  hydrALAZINE Injectable PRN  influenza   Vaccine  insulin glargine Injectable (LANTUS)  insulin lispro (HumaLOG) corrective regimen sliding scale  insulin lispro (HumaLOG) corrective regimen sliding scale  morphine  - Injectable PRN  oxyCODONE    5 mG/acetaminophen 325 mG PRN  pantoprazole    Tablet  spironolactone

## 2018-01-17 NOTE — PROGRESS NOTE ADULT - PROBLEM SELECTOR PLAN 2
Second episode after cardiac cath yesterday  Restarted all PO meds  Tridil gtt overnight now off  BP improved

## 2018-01-17 NOTE — PROGRESS NOTE ADULT - ASSESSMENT
68 y/o male with hypertensive emergency, ICH, and newly diagnosed cardiomyopathy with 70% LAD proximal stenosis   Medical therapy for now due to inability to give ASA and PLavix  Cardiomyopathy is likely a mixture of ischemic and non ischemic (HTN) cardiomyopathy  continue enalapril can increase the dose to 20 mg twice daily   Switch labetalol to carvedilol 25 mg twice daily   continue spirinolactone

## 2018-01-17 NOTE — PROGRESS NOTE ADULT - PROBLEM SELECTOR PLAN 4
SSI mid LAD 70% lesion will need eventual stents when pt cleared for ASA/Plavix  likely will be done as outpt after pt recovered  medical mgt for now.

## 2018-01-18 DIAGNOSIS — K80.00 CALCULUS OF GALLBLADDER WITH ACUTE CHOLECYSTITIS WITHOUT OBSTRUCTION: ICD-10-CM

## 2018-01-18 LAB
ALBUMIN SERPL ELPH-MCNC: 3.4 G/DL — SIGNIFICANT CHANGE UP (ref 3.3–5.2)
ALP SERPL-CCNC: 89 U/L — SIGNIFICANT CHANGE UP (ref 40–120)
ALT FLD-CCNC: 16 U/L — SIGNIFICANT CHANGE UP
AMYLASE P1 CFR SERPL: 50 U/L — SIGNIFICANT CHANGE UP (ref 36–128)
ANION GAP SERPL CALC-SCNC: 15 MMOL/L — SIGNIFICANT CHANGE UP (ref 5–17)
APTT BLD: 33.5 SEC — SIGNIFICANT CHANGE UP (ref 27.5–37.4)
AST SERPL-CCNC: 21 U/L — SIGNIFICANT CHANGE UP
BASOPHILS # BLD AUTO: 0 K/UL — SIGNIFICANT CHANGE UP (ref 0–0.2)
BASOPHILS NFR BLD AUTO: 0.1 % — SIGNIFICANT CHANGE UP (ref 0–2)
BILIRUB DIRECT SERPL-MCNC: 0.3 MG/DL — SIGNIFICANT CHANGE UP (ref 0–0.3)
BILIRUB INDIRECT FLD-MCNC: 0.8 MG/DL — SIGNIFICANT CHANGE UP (ref 0.2–1)
BILIRUB SERPL-MCNC: 1.1 MG/DL — SIGNIFICANT CHANGE UP (ref 0.4–2)
BUN SERPL-MCNC: 37 MG/DL — HIGH (ref 8–20)
CALCIUM SERPL-MCNC: 9 MG/DL — SIGNIFICANT CHANGE UP (ref 8.6–10.2)
CHLORIDE SERPL-SCNC: 94 MMOL/L — LOW (ref 98–107)
CO2 SERPL-SCNC: 26 MMOL/L — SIGNIFICANT CHANGE UP (ref 22–29)
CREAT SERPL-MCNC: 2.62 MG/DL — HIGH (ref 0.5–1.3)
EOSINOPHIL # BLD AUTO: 0.2 K/UL — SIGNIFICANT CHANGE UP (ref 0–0.5)
EOSINOPHIL NFR BLD AUTO: 1.7 % — SIGNIFICANT CHANGE UP (ref 0–5)
GLUCOSE BLDC GLUCOMTR-MCNC: 125 MG/DL — HIGH (ref 70–99)
GLUCOSE BLDC GLUCOMTR-MCNC: 157 MG/DL — HIGH (ref 70–99)
GLUCOSE BLDC GLUCOMTR-MCNC: 221 MG/DL — HIGH (ref 70–99)
GLUCOSE BLDC GLUCOMTR-MCNC: 333 MG/DL — HIGH (ref 70–99)
GLUCOSE SERPL-MCNC: 133 MG/DL — HIGH (ref 70–115)
HCT VFR BLD CALC: 33.9 % — LOW (ref 42–52)
HGB BLD-MCNC: 11.6 G/DL — LOW (ref 14–18)
INR BLD: 1.12 RATIO — SIGNIFICANT CHANGE UP (ref 0.88–1.16)
LIDOCAIN IGE QN: 18 U/L — LOW (ref 22–51)
LYMPHOCYTES # BLD AUTO: 1.7 K/UL — SIGNIFICANT CHANGE UP (ref 1–4.8)
LYMPHOCYTES # BLD AUTO: 12 % — LOW (ref 20–55)
MAGNESIUM SERPL-MCNC: 2.1 MG/DL — SIGNIFICANT CHANGE UP (ref 1.6–2.6)
MCHC RBC-ENTMCNC: 28.9 PG — SIGNIFICANT CHANGE UP (ref 27–31)
MCHC RBC-ENTMCNC: 34.2 G/DL — SIGNIFICANT CHANGE UP (ref 32–36)
MCV RBC AUTO: 84.5 FL — SIGNIFICANT CHANGE UP (ref 80–94)
MONOCYTES # BLD AUTO: 1.4 K/UL — HIGH (ref 0–0.8)
MONOCYTES NFR BLD AUTO: 9.7 % — SIGNIFICANT CHANGE UP (ref 3–10)
NEUTROPHILS # BLD AUTO: 10.5 K/UL — HIGH (ref 1.8–8)
NEUTROPHILS NFR BLD AUTO: 76.1 % — HIGH (ref 37–73)
PHOSPHATE SERPL-MCNC: 4 MG/DL — SIGNIFICANT CHANGE UP (ref 2.4–4.7)
PLATELET # BLD AUTO: 188 K/UL — SIGNIFICANT CHANGE UP (ref 150–400)
POTASSIUM SERPL-MCNC: 4.4 MMOL/L — SIGNIFICANT CHANGE UP (ref 3.5–5.3)
POTASSIUM SERPL-SCNC: 4.4 MMOL/L — SIGNIFICANT CHANGE UP (ref 3.5–5.3)
PROT SERPL-MCNC: 6.8 G/DL — SIGNIFICANT CHANGE UP (ref 6.6–8.7)
PROTHROM AB SERPL-ACNC: 12.4 SEC — SIGNIFICANT CHANGE UP (ref 9.8–12.7)
RBC # BLD: 4.01 M/UL — LOW (ref 4.6–6.2)
RBC # FLD: 13 % — SIGNIFICANT CHANGE UP (ref 11–15.6)
SODIUM SERPL-SCNC: 135 MMOL/L — SIGNIFICANT CHANGE UP (ref 135–145)
WBC # BLD: 13.8 K/UL — HIGH (ref 4.8–10.8)
WBC # FLD AUTO: 13.8 K/UL — HIGH (ref 4.8–10.8)

## 2018-01-18 PROCEDURE — 99233 SBSQ HOSP IP/OBS HIGH 50: CPT

## 2018-01-18 PROCEDURE — 76700 US EXAM ABDOM COMPLETE: CPT | Mod: 26

## 2018-01-18 RX ORDER — PIPERACILLIN AND TAZOBACTAM 4; .5 G/20ML; G/20ML
3.38 INJECTION, POWDER, LYOPHILIZED, FOR SOLUTION INTRAVENOUS EVERY 8 HOURS
Refills: 0 | Status: DISCONTINUED | OUTPATIENT
Start: 2018-01-18 | End: 2018-01-24

## 2018-01-18 RX ORDER — SODIUM CHLORIDE 9 MG/ML
1000 INJECTION INTRAMUSCULAR; INTRAVENOUS; SUBCUTANEOUS
Refills: 0 | Status: DISCONTINUED | OUTPATIENT
Start: 2018-01-18 | End: 2018-01-24

## 2018-01-18 RX ORDER — PIPERACILLIN AND TAZOBACTAM 4; .5 G/20ML; G/20ML
INJECTION, POWDER, LYOPHILIZED, FOR SOLUTION INTRAVENOUS
Refills: 0 | Status: DISCONTINUED | OUTPATIENT
Start: 2018-01-18 | End: 2018-01-18

## 2018-01-18 RX ORDER — HYDRALAZINE HCL 50 MG
25 TABLET ORAL THREE TIMES A DAY
Refills: 0 | Status: DISCONTINUED | OUTPATIENT
Start: 2018-01-18 | End: 2018-01-24

## 2018-01-18 RX ORDER — MORPHINE SULFATE 50 MG/1
0.5 CAPSULE, EXTENDED RELEASE ORAL EVERY 4 HOURS
Refills: 0 | Status: DISCONTINUED | OUTPATIENT
Start: 2018-01-18 | End: 2018-01-25

## 2018-01-18 RX ADMIN — Medication 25 MILLIGRAM(S): at 22:41

## 2018-01-18 RX ADMIN — PIPERACILLIN AND TAZOBACTAM 25 GRAM(S): 4; .5 INJECTION, POWDER, LYOPHILIZED, FOR SOLUTION INTRAVENOUS at 22:41

## 2018-01-18 RX ADMIN — OXYCODONE AND ACETAMINOPHEN 1 TABLET(S): 5; 325 TABLET ORAL at 22:47

## 2018-01-18 RX ADMIN — CARVEDILOL PHOSPHATE 25 MILLIGRAM(S): 80 CAPSULE, EXTENDED RELEASE ORAL at 17:51

## 2018-01-18 RX ADMIN — OXYCODONE AND ACETAMINOPHEN 1 TABLET(S): 5; 325 TABLET ORAL at 23:20

## 2018-01-18 RX ADMIN — Medication 25 MILLIGRAM(S): at 14:42

## 2018-01-18 RX ADMIN — ATORVASTATIN CALCIUM 40 MILLIGRAM(S): 80 TABLET, FILM COATED ORAL at 22:41

## 2018-01-18 RX ADMIN — PANTOPRAZOLE SODIUM 40 MILLIGRAM(S): 20 TABLET, DELAYED RELEASE ORAL at 05:31

## 2018-01-18 RX ADMIN — Medication 10 MILLIGRAM(S): at 05:31

## 2018-01-18 RX ADMIN — CARVEDILOL PHOSPHATE 25 MILLIGRAM(S): 80 CAPSULE, EXTENDED RELEASE ORAL at 05:31

## 2018-01-18 RX ADMIN — SODIUM CHLORIDE 75 MILLILITER(S): 9 INJECTION INTRAMUSCULAR; INTRAVENOUS; SUBCUTANEOUS at 14:42

## 2018-01-18 RX ADMIN — AMLODIPINE BESYLATE 10 MILLIGRAM(S): 2.5 TABLET ORAL at 05:31

## 2018-01-18 RX ADMIN — SPIRONOLACTONE 25 MILLIGRAM(S): 25 TABLET, FILM COATED ORAL at 05:31

## 2018-01-18 RX ADMIN — INSULIN GLARGINE 14 UNIT(S): 100 INJECTION, SOLUTION SUBCUTANEOUS at 22:41

## 2018-01-18 RX ADMIN — PIPERACILLIN AND TAZOBACTAM 25 GRAM(S): 4; .5 INJECTION, POWDER, LYOPHILIZED, FOR SOLUTION INTRAVENOUS at 14:35

## 2018-01-18 NOTE — PROGRESS NOTE ADULT - SUBJECTIVE AND OBJECTIVE BOX
NEPHROLOGY INTERVAL HPI/OVERNIGHT EVENTS:  pt clinically stable  no acute distress when seen earlier    MEDICATIONS  (STANDING):  amLODIPine   Tablet 10 milliGRAM(s) Oral daily  atorvastatin 40 milliGRAM(s) Oral at bedtime  carvedilol 25 milliGRAM(s) Oral every 12 hours  dextrose 5%. 1000 milliLiter(s) (50 mL/Hr) IV Continuous <Continuous>  dextrose 50% Injectable 12.5 Gram(s) IV Push once  dextrose 50% Injectable 25 Gram(s) IV Push once  dextrose 50% Injectable 25 Gram(s) IV Push once  enalapril 10 milliGRAM(s) Oral two times a day  enoxaparin Injectable 30 milliGRAM(s) SubCutaneous daily  influenza   Vaccine 0.5 milliLiter(s) IntraMuscular once  insulin glargine Injectable (LANTUS) 14 Unit(s) SubCutaneous at bedtime  insulin lispro (HumaLOG) corrective regimen sliding scale   SubCutaneous three times a day before meals  insulin lispro (HumaLOG) corrective regimen sliding scale   SubCutaneous at bedtime  pantoprazole    Tablet 40 milliGRAM(s) Oral before breakfast  sodium chloride 0.45%. 1000 milliLiter(s) (60 mL/Hr) IV Continuous <Continuous>  spironolactone 25 milliGRAM(s) Oral daily    MEDICATIONS  (PRN):  aluminum hydroxide/magnesium hydroxide/simethicone Suspension 30 milliLiter(s) Oral every 4 hours PRN Dyspepsia  dextrose Gel 1 Dose(s) Oral once PRN Blood Glucose LESS THAN 70 milliGRAM(s)/deciliter  glucagon  Injectable 1 milliGRAM(s) IntraMuscular once PRN Glucose LESS THAN 70 milligrams/deciliter  hydrALAZINE Injectable 10 milliGRAM(s) IV Push every 4 hours PRN sbp>160  morphine  - Injectable 2 milliGRAM(s) IV Push every 4 hours PRN Severe Pain (7 - 10)  oxyCODONE    5 mG/acetaminophen 325 mG 1 Tablet(s) Oral every 4 hours PRN Moderate Pain (4 - 6)      Allergies    No Known Allergies          Vital Signs Last 24 Hrs  T(C): 37.3 (18 Jan 2018 05:27), Max: 37.4 (17 Jan 2018 14:11)  T(F): 99.1 (18 Jan 2018 05:27), Max: 99.4 (17 Jan 2018 20:21)  HR: 68 (18 Jan 2018 08:10) (65 - 82)  BP: 138/70 (18 Jan 2018 08:10) (116/60 - 164/72)  BP(mean): 101 (17 Jan 2018 13:00) (99 - 109)  RR: 16 (18 Jan 2018 08:10) (16 - 20)  SpO2: 99% (18 Jan 2018 08:10) (95% - 99%)    PHYSICAL EXAM:  GENERAL: NAD  HEAD:  NC/AT  NECK: Supple, No JVD,   NERVOUS SYSTEM:  L hemiplegia   CHEST/LUNG: Clear with diminished BS at bases  HEART: Regular rate; no rub  ABDOMEN: Soft, Nontender, Nondistended; +BS  EXTREMITIES:  No edema     LABS:                        11.6   13.8  )-----------( 188      ( 18 Jan 2018 06:36 )             33.9     01-18    135  |  94<L>  |  37.0<H>  ----------------------------<  133<H>  4.4   |  26.0  |  2.62<H>    Creatinine, Serum: 1.87 mg/dL (01.17.18 @ 05:21)        Ca    9.0      18 Jan 2018 06:36  Phos  4.0     01-18  Mg     2.1     01-18          Magnesium, Serum: 2.1 mg/dL (01-18 @ 06:36)  Phosphorus Level, Serum: 4.0 mg/dL (01-18 @ 06:36)      RADIOLOGY & ADDITIONAL TESTS:  < from: US Renal (01.15.18 @ 12:13) >     EXAM:  US KIDNEY(S)                          PROCEDURE DATE:  01/15/2018          INTERPRETATION:  CLINICAL INFORMATION: Acute renal injury. Evaluate for   obstruction.    COMPARISON: None available.    TECHNIQUE: Sonography of the kidneys and bladder.     FINDINGS:    Right kidney:  8.5 cm. There are cysts including a 1.5 cm cyst with thin   septations.    Left kidney:  12.0 cm. There are cysts measuring up to 7.6 cm. The   largest cyst contains a thin septation.    Urinary bladder: Within normal limits.    Gallstones are incidentally noted.    IMPRESSION:     Mildly atrophic right kidney.    Bilateral renal cysts.    < end of copied text >

## 2018-01-18 NOTE — OCCUPATIONAL THERAPY INITIAL EVALUATION ADULT - MANUAL MUSCLE TESTING RESULTS, REHAB EVAL
0/5 left UE, 4/5 right UE
Right UE 5/5 strength and Left UE 2/5 strength shoulder and elbow sitting up and hand 3/5/grossly assessed due to

## 2018-01-18 NOTE — PHYSICAL THERAPY INITIAL EVALUATION ADULT - MANUAL MUSCLE TESTING RESULTS, REHAB EVAL
in the right UE/LE/no strength deficits were identified
grossly 3+/5 in the right UE and LE; left UE and LE 0/5

## 2018-01-18 NOTE — OCCUPATIONAL THERAPY INITIAL EVALUATION ADULT - RANGE OF MOTION EXAMINATION, UPPER EXTREMITY
Right UE Active ROM was WNL (within normal limits)/Right UE Passive ROM was WNL (within normal limits)
Left UE Passive ROM was WFL  (within functional limits)/Right UE Active ROM was WNL (within normal limits)

## 2018-01-18 NOTE — OCCUPATIONAL THERAPY INITIAL EVALUATION ADULT - PLANNED THERAPY INTERVENTIONS, OT EVAL
ADL retraining/IADL retraining/balance training/neuromuscular re-education/transfer training
ADL retraining/balance training/fine motor coordination training/neuromuscular re-education/ROM/strengthening/stretching/transfer training

## 2018-01-18 NOTE — PROGRESS NOTE ADULT - SUBJECTIVE AND OBJECTIVE BOX
CHIEF COMPLAINT/INTERVAL HISTORY:    Patient is a 67y old  Male who presents with a chief complaint of stroke (11 Jan 2018 21:41)      HPI:  66M transferred from [facility]. PMHX of HTN He presented AMS. Head Ct showed a Right Thalamic Hemorrhagic Bleed with some Effacement of the Lateral Ventricle and surrounding Edema.  He was brought to [facility] for further management and Neurosurgical evaluation. His BP in the ER was 170 and he was placed on a Cardene Infusion. (11 Jan 2018 19:20)      SUBJECTIVE & OBJECTIVE/ ROS: Pt seen and examined at bedside. Pt seen and examined at bedside with . Denies  any HA, N/V today but does have abdominal pain. Did have a BM yesterday, no diarrhea. Denies  alcohol intake. No other complaints expressed.     ICU Vital Signs Last 24 Hrs  T(C): 36.3 (18 Jan 2018 10:16), Max: 37.4 (17 Jan 2018 14:11)  T(F): 97.4 (18 Jan 2018 10:16), Max: 99.4 (17 Jan 2018 20:21)  HR: 67 (18 Jan 2018 10:16) (67 - 82)  BP: 136/58 (18 Jan 2018 10:16) (116/60 - 164/72)  BP(mean): 101 (17 Jan 2018 13:00) (101 - 101)  ABP: --  ABP(mean): --  RR: 18 (18 Jan 2018 10:16) (16 - 20)  SpO2: 99% (18 Jan 2018 08:10) (95% - 99%)        MEDICATIONS  (STANDING):  amLODIPine   Tablet 10 milliGRAM(s) Oral daily  atorvastatin 40 milliGRAM(s) Oral at bedtime  carvedilol 25 milliGRAM(s) Oral every 12 hours  dextrose 5%. 1000 milliLiter(s) (50 mL/Hr) IV Continuous <Continuous>  dextrose 50% Injectable 12.5 Gram(s) IV Push once  dextrose 50% Injectable 25 Gram(s) IV Push once  dextrose 50% Injectable 25 Gram(s) IV Push once  enoxaparin Injectable 30 milliGRAM(s) SubCutaneous daily  hydrALAZINE 25 milliGRAM(s) Oral three times a day  influenza   Vaccine 0.5 milliLiter(s) IntraMuscular once  insulin glargine Injectable (LANTUS) 14 Unit(s) SubCutaneous at bedtime  insulin lispro (HumaLOG) corrective regimen sliding scale   SubCutaneous three times a day before meals  insulin lispro (HumaLOG) corrective regimen sliding scale   SubCutaneous at bedtime  pantoprazole    Tablet 40 milliGRAM(s) Oral before breakfast  piperacillin/tazobactam IVPB.      sodium chloride 0.9%. 1000 milliLiter(s) (75 mL/Hr) IV Continuous <Continuous>    MEDICATIONS  (PRN):  aluminum hydroxide/magnesium hydroxide/simethicone Suspension 30 milliLiter(s) Oral every 4 hours PRN Dyspepsia  dextrose Gel 1 Dose(s) Oral once PRN Blood Glucose LESS THAN 70 milliGRAM(s)/deciliter  glucagon  Injectable 1 milliGRAM(s) IntraMuscular once PRN Glucose LESS THAN 70 milligrams/deciliter  hydrALAZINE Injectable 10 milliGRAM(s) IV Push every 4 hours PRN sbp>160  morphine  - Injectable 2 milliGRAM(s) IV Push every 4 hours PRN Severe Pain (7 - 10)  oxyCODONE    5 mG/acetaminophen 325 mG 1 Tablet(s) Oral every 4 hours PRN Moderate Pain (4 - 6)      LABS:                        11.6   13.8  )-----------( 188      ( 18 Jan 2018 06:36 )             33.9     01-18    135  |  94<L>  |  37.0<H>  ----------------------------<  133<H>  4.4   |  26.0  |  2.62<H>    Ca    9.0      18 Jan 2018 06:36  Phos  4.0     01-18  Mg     2.1     01-18            CAPILLARY BLOOD GLUCOSE      POCT Blood Glucose.: 157 mg/dL (18 Jan 2018 12:06)  POCT Blood Glucose.: 181 mg/dL (17 Jan 2018 20:45)  POCT Blood Glucose.: 253 mg/dL (17 Jan 2018 17:04)      RECENT CULTURES:      RADIOLOGY & ADDITIONAL TESTS:      PHYSICAL EXAM:  GENERAL: NAD  HEENT: EOMI  Neck: supple  CHEST/LUNG: CTA b/l   HEART: S1S2+ audible  ABDOMEN: Soft, +Rebolledo's, diffuse abd tenderness to palp (highest in RUQ), Nondistended; Bowel sounds present  EXTREMITIES:  no edema  CNS: AAO X 3, left sided hemiplegia

## 2018-01-18 NOTE — CONSULT NOTE ADULT - ASSESSMENT
CKD - DM, HTN  complicated by L thalamic bleed , R/O Secondary HTN . BP Under better control   + new cardiomyopathy   For cardiac cath in am     CT angio shows no gross BRIAN or adrenal lesions   Aldactone added ( this will alter ability to detect Conn's syndrome )     Recommend   Mucomyst bid x 4 doses   IV bicarb 12 h pre and post cath ( see orders )   Check UDS , cortisol, TFT's, [place]/PRA , PMN/ NMN   UA and Urine P/C  Watch K+ on ACE and Aldactone combination         Mild complex L renal cyst on sono today ---> No enhancement on recent CT angio
66 yo m w/ hermorrhagic stroke admitted 9 days ago now with acute cholecystitis.    Plan  - IR cholecystostomy tube tomorrow   - hold am lovenox  - cont npo/ivf  - IV abx  - f/u liver function test/lipase    Pt was seen and examined along with Dr. Peralta
The patient is a 67y Male with what appears to be hypertensive right thalamic intracranial hemorrhage.    Recommend blood pressure control.     Mobilize with physical therapy.     Will need acute rehab when blood pressure stable.
66 y/o male with history of HTN presents with somewhat indeterminate history - through  patient reports he fell from a laddy while trying to place some sort of shingle. He does not remember anything else about the fall and believes he lost consciousness. Denies history of seizures. Denies other prodromal or associated factors.   - Transferred from [facility] after CTH revealed R thalamic hemorrhage and was hypertensive at presentation  - Exam reveals L hemiparesis and hemisensory loss if exhibited with clonus and L sided spasticity    PLAN:  - Repeat HCT w/o  - Additional films: CTA/CTV/CT C-spine d/t UMN findings on exam  - C-collar in place until further recommendations upon imaging review  - Neurochecks q1h  - Recommend systolic BP goal <160mmHg to allow for permissive HTN    Further recommendations pending above work up.
65 y/o transferred from [facility] with a PMH of uncontrolled HTN after falling off ladder and found to have a left thalmic intracranial hemmorhage.  Treated for hypertensive crisis with cardene  Asked to see patient for chest discomfort which has been occurring for several days.  Also having upper back pain  Pain is reproducible and also has a pleurtic component to it  Cardiac monitoring reveals sinus rhythmn in the 60s

## 2018-01-18 NOTE — OCCUPATIONAL THERAPY INITIAL EVALUATION ADULT - GENERAL OBSERVATIONS, REHAB EVAL
pt received lying in bed ad left as found, + telemetry active right forearm IV
Pt received sitting up in bed post testing- RN OK with therapy

## 2018-01-18 NOTE — PROGRESS NOTE ADULT - SUBJECTIVE AND OBJECTIVE BOX
Patient in bed.  Comfortable.  Having some lower abdominal discomfort and pain.   Had abdominal US and shows likely acute cholecystitis with cholelithiasis. WBC elevated. Has no fevers and clinically stable.   ACS saw patient prior and to follow up for a plan/management.  Patient continues to have left UE and LE weakness with more spasticity.    FUNCTIONAL PROGRESS  1/18  Bed Mobility: Rolling/Turning:     · Level of [place]	minimum assist (75% patients effort)	  · Physical Assist/Nonphysical Assist	1 person assist	  · Assistive Device	bed rails	    Bed Mobility: Scooting/Bridging:     · Level of [place]	maximum assist (25% patients effort)	  · Physical Assist/Nonphysical Assist	verbal cues; 1 person assist	  · Assistive Device	bed rails	    Bed Mobility: Sit to Supine:     · Level of [place]	maximum assist (25% patients effort)	  · Physical Assist/Nonphysical Assist	1 person assist	  · Assistive Device	bed rails	    Bed Mobility: Supine to Sit:     · Level of [place]	maximum assist (25% patients effort)	  · Physical Assist/Nonphysical Assist	verbal cues; 1 person assist	  · Assistive Device	bed rails	    Bed Mobility Analysis:     · Bed Mobility Limitations	decreased ability to use arms for pushing/pulling; decreased ability to use legs for bridging/pushing	  · Impairments Contributing to Impaired Bed Mobility	impaired balance; impaired coordination; decreased flexibility; impaired motor control; abnormal muscle tone; impaired postural control; decreased ROM; decreased strength; impaired sensory feedback; decreased sensation	    Transfer: Sit to Stand:     · Level of [place]	maximum assist (25% patients effort)	  · Physical Assist/Nonphysical Assist	2 person assist	  · Weight-Bearing Restrictions	full weight-bearing	  · Assistive Device	rolling walker	    Transfer: Stand to Sit:     · Level of [place]	maximum assist (25% patients effort)	  · Physical Assist/Nonphysical Assist	2 person assist	  · Weight-Bearing Restrictions	full weight-bearing	  · Assistive Device	rolling walker	    Sit/Stand Transfer Safety Analysis:     · Transfer Safety Concerns Noted	decreased safety awareness; losing balance; decreased sequencing ability; decreased weight-shifting ability	  · Impairments Contributing to Impaired Transfers	impaired coordination; decreased flexibility; impaired motor control; abnormal muscle tone; decreased ROM; decreased strength; impaired sensory feedback; decreased sensation	    Gait Skills:     · Level of [place]	TBA	      REVIEW OF SYSTEMS  Constitutional - No fever,  +fatigue  Neurological - +loss of strength, No numbness, No tremors  Musculoskeletal - +joint pain, No joint swelling, No muscle pain  Psychiatric - No depression, No anxiety    VITALS  T(C): 36.3 (01-18-18 @ 10:16), Max: 37.4 (01-17-18 @ 20:21)  HR: 67 (01-18-18 @ 10:16) (67 - 82)  BP: 136/58 (01-18-18 @ 10:16) (116/60 - 164/72)  RR: 18 (01-18-18 @ 10:16) (16 - 20)  SpO2: 99% (01-18-18 @ 08:10) (95% - 99%)  Wt(kg): --    MEDICATIONS   aluminum hydroxide/magnesium hydroxide/simethicone Suspension 30 milliLiter(s) every 4 hours PRN  amLODIPine   Tablet 10 milliGRAM(s) daily  atorvastatin 40 milliGRAM(s) at bedtime  carvedilol 25 milliGRAM(s) every 12 hours  dextrose 5%. 1000 milliLiter(s) <Continuous>  dextrose 50% Injectable 12.5 Gram(s) once  dextrose 50% Injectable 25 Gram(s) once  dextrose 50% Injectable 25 Gram(s) once  dextrose Gel 1 Dose(s) once PRN  enoxaparin Injectable 30 milliGRAM(s) daily  glucagon  Injectable 1 milliGRAM(s) once PRN  hydrALAZINE 25 milliGRAM(s) three times a day  hydrALAZINE Injectable 10 milliGRAM(s) every 4 hours PRN  influenza   Vaccine 0.5 milliLiter(s) once  insulin glargine Injectable (LANTUS) 14 Unit(s) at bedtime  insulin lispro (HumaLOG) corrective regimen sliding scale   three times a day before meals  insulin lispro (HumaLOG) corrective regimen sliding scale   at bedtime  morphine  - Injectable 0.5 milliGRAM(s) every 4 hours PRN  oxyCODONE    5 mG/acetaminophen 325 mG 1 Tablet(s) every 4 hours PRN  pantoprazole    Tablet 40 milliGRAM(s) before breakfast  piperacillin/tazobactam IVPB. 3.375 Gram(s) every 8 hours  sodium chloride 0.9%. 1000 milliLiter(s) <Continuous>      RECENT LABS/IMAGING  CBC Full  -  ( 18 Jan 2018 06:36 )  WBC Count : 13.8 K/uL  Hemoglobin : 11.6 g/dL  Hematocrit : 33.9 %  Platelet Count - Automated : 188 K/uL  Mean Cell Volume : 84.5 fl  Mean Cell Hemoglobin : 28.9 pg  Mean Cell Hemoglobin Concentration : 34.2 g/dL  Auto Neutrophil # : 10.5 K/uL  Auto Lymphocyte # : 1.7 K/uL  Auto Monocyte # : 1.4 K/uL  Auto Eosinophil # : 0.2 K/uL  Auto Basophil # : 0.0 K/uL  Auto Neutrophil % : 76.1 %  Auto Lymphocyte % : 12.0 %  Auto Monocyte % : 9.7 %  Auto Eosinophil % : 1.7 %  Auto Basophil % : 0.1 %    01-18    135  |  94<L>  |  37.0<H>  ----------------------------<  133<H>  4.4   |  26.0  |  2.62<H>    Ca    9.0      18 Jan 2018 06:36  Phos  4.0     01-18  Mg     2.1     01-18        -----------------------------------------  PHYSICAL EXAM  Constitutional - NAD, Comfortable  Neurologic Exam -                    Cognitive - Awake, Alert, AAO to self, situation     Communication - +dysarthria     Cranial Nerves - loss left nasolabial fold     Motor                     LEFT    UE - ShAB 1/5, EF 1/5, EE 1/5, WE 1/5,  1/5                    LEFT    LE - HF 1/5, KE 1/5, DF 0/5, PF 0/5    Tone - 2/4 on EF and KE	     Sensory - decreased sensation left UE and LE      Psychiatric - Fatigued  ----------------------------------------------------------------------------------------  ASSESSMENT/PLAN  67M with 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 Recommend ACUTE inpatient rehabilitation for the functional deficits consisting of 3 hours of therapy/day & 24 hour RN/daily PMR physician for comorbid medical management.     Continue bedside therapy as well as OOB throughout the day with mobilization throughout the day with staff to maintain cardiopulmonary function and prevention of secondary complications related to debility.

## 2018-01-18 NOTE — PROGRESS NOTE ADULT - ASSESSMENT
Assessment and Plan:   · Assessment		  CKD - DM, HTN (CT angio shows no gross BRIAN or adrenal lesions)  Thalamic bleed==> BP better control now  s/p cardiac cath  ARF now==> r/o BRIANNA, renal hypoperfusion, meds  - hold ACE and aldactone  - check urine studies  - gentle IVF  - monitor labs

## 2018-01-18 NOTE — PROGRESS NOTE ADULT - PROBLEM SELECTOR PLAN 2
Likely related to uncontrolled HTN  c/o HA, N/V x 1 hour yesterday, resolved today, repeat CT head with stable ICH  NSG signed off, neurology consulted  stroke workup completed  will need acute rehab on d/c  PT/OT evals ordered- Acute rehab when stable  DVT ppx resumed

## 2018-01-18 NOTE — CONSULT NOTE ADULT - SUBJECTIVE AND OBJECTIVE BOX
INTERVAL HPI/OVERNIGHT EVENTS:  66 yo m w/ was admitted 9 days ago for altered mental status was found to have hemorrhagic stroke on the R basal ganglia; likely due to uncontrolled HTN.   pt had some nausea, and one episode of emesis one day ago, pain sharp, RUQ, radiating to the back; no fever/ no chills.   on exam RUQ ttp w/ piper sign. on labs leukocytosis worsening.   US consistent with acute cholecystitis.   surgery consulted for possible intervention.    PMH/PSH: Hemorrhagic stroke, HTN, DM, cardiomyopathy        MEDICATIONS  (STANDING):  amLODIPine   Tablet 10 milliGRAM(s) Oral daily  atorvastatin 40 milliGRAM(s) Oral at bedtime  carvedilol 25 milliGRAM(s) Oral every 12 hours  dextrose 5%. 1000 milliLiter(s) (50 mL/Hr) IV Continuous <Continuous>  dextrose 50% Injectable 12.5 Gram(s) IV Push once  dextrose 50% Injectable 25 Gram(s) IV Push once  dextrose 50% Injectable 25 Gram(s) IV Push once  hydrALAZINE 25 milliGRAM(s) Oral three times a day  influenza   Vaccine 0.5 milliLiter(s) IntraMuscular once  insulin glargine Injectable (LANTUS) 14 Unit(s) SubCutaneous at bedtime  insulin lispro (HumaLOG) corrective regimen sliding scale   SubCutaneous three times a day before meals  insulin lispro (HumaLOG) corrective regimen sliding scale   SubCutaneous at bedtime  pantoprazole    Tablet 40 milliGRAM(s) Oral before breakfast  piperacillin/tazobactam IVPB. 3.375 Gram(s) IV Intermittent every 8 hours  sodium chloride 0.9%. 1000 milliLiter(s) (75 mL/Hr) IV Continuous <Continuous>    MEDICATIONS  (PRN):  aluminum hydroxide/magnesium hydroxide/simethicone Suspension 30 milliLiter(s) Oral every 4 hours PRN Dyspepsia  dextrose Gel 1 Dose(s) Oral once PRN Blood Glucose LESS THAN 70 milliGRAM(s)/deciliter  glucagon  Injectable 1 milliGRAM(s) IntraMuscular once PRN Glucose LESS THAN 70 milligrams/deciliter  hydrALAZINE Injectable 10 milliGRAM(s) IV Push every 4 hours PRN sbp>160  morphine  - Injectable 0.5 milliGRAM(s) IV Push every 4 hours PRN Moderate Pain (4 - 6)  oxyCODONE    5 mG/acetaminophen 325 mG 1 Tablet(s) Oral every 4 hours PRN Moderate Pain (4 - 6)      Vital Signs Last 24 Hrs  T(C): 36.7 (18 Jan 2018 15:41), Max: 37.4 (17 Jan 2018 20:21)  T(F): 98.1 (18 Jan 2018 15:41), Max: 99.4 (17 Jan 2018 20:21)  HR: 70 (18 Jan 2018 15:41) (67 - 82)  BP: 138/63 (18 Jan 2018 15:41) (116/60 - 164/72)  BP(mean): --  RR: 18 (18 Jan 2018 15:41) (16 - 20)  SpO2: 99% (18 Jan 2018 08:10) (95% - 99%)    PE  Gen: NAD  Pulm: no respiratory distress  CV: no tachycardia  Abd: s/nd/RUQ ttp, + piper sign.   Neuro: decreased motor/sensory function in the LUE/LLE's      I&O's Detail    17 Jan 2018 07:01  -  18 Jan 2018 07:00  --------------------------------------------------------  IN:    Oral Fluid: 120 mL    sodium chloride 0.45%: 900 mL  Total IN: 1020 mL    OUT:    Voided: 425 mL  Total OUT: 425 mL    Total NET: 595 mL      18 Jan 2018 07:01  -  18 Jan 2018 15:51  --------------------------------------------------------  IN:    sodium chloride 0.9%.: 375 mL  Total IN: 375 mL    OUT:    Voided: 850 mL  Total OUT: 850 mL    Total NET: -475 mL          LABS:                        11.6   13.8  )-----------( 188      ( 18 Jan 2018 06:36 )             33.9     01-18    133<L>  |  93<L>  |  37.0<H>  ----------------------------<  124<H>  4.1   |  26.0  |  2.33<H>    Ca    9.0      18 Jan 2018 14:42  Phos  4.0     01-18  Mg     2.1     01-18    TPro  6.7  /  Alb  3.4  /  TBili  1.1  /  DBili  0.3  /  AST  21  /  ALT  15  /  AlkPhos  88  01-18    PT/INR - ( 18 Jan 2018 14:42 )   PT: 12.4 sec;   INR: 1.12 ratio         PTT - ( 18 Jan 2018 14:42 )  PTT:33.5 sec      RADIOLOGY & ADDITIONAL STUDIES:

## 2018-01-18 NOTE — PROGRESS NOTE ADULT - PROBLEM SELECTOR PLAN 5
mid LAD 70% lesion will need eventual stents when pt cleared for ASA/Plavix, f/u with neurosurgery about when to start ASA  PCI likely will be done as outpt after pt recovered  medical mgt for now.

## 2018-01-18 NOTE — OCCUPATIONAL THERAPY INITIAL EVALUATION ADULT - ADDITIONAL COMMENTS
pt independent prior and does not own any DME.  Pt is single and lives alone
pt states has friends who can help no family

## 2018-01-18 NOTE — CONSULT NOTE ADULT - CONSULT REASON
transfer for thalamic ICH
CKD, for cath
Acute cholecystitis
Chest discomfort
intracranial hemorrhage

## 2018-01-18 NOTE — PROGRESS NOTE ADULT - ASSESSMENT
67 yom transferred from [place] after having left sided weakness found to have ICH likely hypertensive. Pt was initially managed in ICU until stable, stroke workup complete, neurology following.  Cardio decided to cath pt yesterday as part of workup and pt was NPO, post procedure he was found to be extremely hypertensive with HTN crisis and was brought to ICU overnight last night for BP mgt.  Pt was found to have a 70% midLAD lesion that will be medically managed for now due to fact that he is currently not candidate for ASA/Plavix due to ICH> spoke with cardio NP and pt not on ASA/Plavix at this time due to ICH and they are aware of this, eventually they will d/w neurology/NS about when it will be safe to restart these meds.  Dx: HTN crisis, s/p ICH due to prior episode HTN crisis, CVA, CAD. s/p ICU downgrade. Hospital course complicated by abdominal pain, N/V suspected acute brooks

## 2018-01-18 NOTE — PROGRESS NOTE ADULT - PROBLEM SELECTOR PLAN 1
+Rebolledo's, diffuse abd tenderness to palp (highest in RUQ), N/V , leukocytosis, suspected acute brooks  Abd US: Gallbladder with Markedly distended with sludge and stones. Extensive wall thickening and pericholecystic inflammatory changes. Positive sonographic   Rebolledo sign. Cholelithiasis and Acute cholecystitis highly suspected.  No fevers yet  ACS called  NPO  IVF  IV Zosyn started (renal dose)  Blood cx  Vitals stable

## 2018-01-19 PROBLEM — Z00.00 ENCOUNTER FOR PREVENTIVE HEALTH EXAMINATION: Status: ACTIVE | Noted: 2018-01-19

## 2018-01-19 LAB
GLUCOSE BLDC GLUCOMTR-MCNC: 114 MG/DL — HIGH (ref 70–99)
GLUCOSE BLDC GLUCOMTR-MCNC: 127 MG/DL — HIGH (ref 70–99)
GLUCOSE BLDC GLUCOMTR-MCNC: 180 MG/DL — HIGH (ref 70–99)
GLUCOSE BLDC GLUCOMTR-MCNC: 277 MG/DL — HIGH (ref 70–99)
GRAM STN FLD: SIGNIFICANT CHANGE UP
MAGNESIUM SERPL-MCNC: 2.2 MG/DL — SIGNIFICANT CHANGE UP (ref 1.8–2.6)
PHOSPHATE SERPL-MCNC: 3.1 MG/DL — SIGNIFICANT CHANGE UP (ref 2.4–4.7)
SPECIMEN SOURCE: SIGNIFICANT CHANGE UP

## 2018-01-19 PROCEDURE — 99233 SBSQ HOSP IP/OBS HIGH 50: CPT

## 2018-01-19 PROCEDURE — 47490 INCISION OF GALLBLADDER: CPT

## 2018-01-19 RX ADMIN — ATORVASTATIN CALCIUM 40 MILLIGRAM(S): 80 TABLET, FILM COATED ORAL at 21:36

## 2018-01-19 RX ADMIN — CARVEDILOL PHOSPHATE 25 MILLIGRAM(S): 80 CAPSULE, EXTENDED RELEASE ORAL at 05:48

## 2018-01-19 RX ADMIN — Medication 25 MILLIGRAM(S): at 05:48

## 2018-01-19 RX ADMIN — CARVEDILOL PHOSPHATE 25 MILLIGRAM(S): 80 CAPSULE, EXTENDED RELEASE ORAL at 18:19

## 2018-01-19 RX ADMIN — PANTOPRAZOLE SODIUM 40 MILLIGRAM(S): 20 TABLET, DELAYED RELEASE ORAL at 05:48

## 2018-01-19 RX ADMIN — PIPERACILLIN AND TAZOBACTAM 25 GRAM(S): 4; .5 INJECTION, POWDER, LYOPHILIZED, FOR SOLUTION INTRAVENOUS at 05:48

## 2018-01-19 RX ADMIN — PIPERACILLIN AND TAZOBACTAM 25 GRAM(S): 4; .5 INJECTION, POWDER, LYOPHILIZED, FOR SOLUTION INTRAVENOUS at 21:35

## 2018-01-19 RX ADMIN — Medication 25 MILLIGRAM(S): at 14:12

## 2018-01-19 RX ADMIN — MORPHINE SULFATE 0.5 MILLIGRAM(S): 50 CAPSULE, EXTENDED RELEASE ORAL at 21:17

## 2018-01-19 RX ADMIN — INSULIN GLARGINE 14 UNIT(S): 100 INJECTION, SOLUTION SUBCUTANEOUS at 22:12

## 2018-01-19 RX ADMIN — Medication 1: at 22:12

## 2018-01-19 RX ADMIN — Medication 25 MILLIGRAM(S): at 21:36

## 2018-01-19 RX ADMIN — MORPHINE SULFATE 0.5 MILLIGRAM(S): 50 CAPSULE, EXTENDED RELEASE ORAL at 21:00

## 2018-01-19 RX ADMIN — AMLODIPINE BESYLATE 10 MILLIGRAM(S): 2.5 TABLET ORAL at 05:48

## 2018-01-19 RX ADMIN — SODIUM CHLORIDE 75 MILLILITER(S): 9 INJECTION INTRAMUSCULAR; INTRAVENOUS; SUBCUTANEOUS at 21:35

## 2018-01-19 RX ADMIN — PIPERACILLIN AND TAZOBACTAM 25 GRAM(S): 4; .5 INJECTION, POWDER, LYOPHILIZED, FOR SOLUTION INTRAVENOUS at 14:12

## 2018-01-19 NOTE — PROGRESS NOTE ADULT - SUBJECTIVE AND OBJECTIVE BOX
Patient was in pre-op and refused cholecystostomy tube for  acute cholecystitis.  He is still NPO, unclear what the plan is.   he feels his abdominal pain is better.  Patient feels fatigued and soar because he has been in bed all these days and wants to be able to get up. He is also hungry.   Continues to have weakness and tightness of the left side is worsened.  WBC is downtrending. LFTs are worsened.     FUNCTIONAL PROGRESS  1/19  Bed Mobility  Bed Mobility Training Rolling/Turning: moderate assist (50% patient effort);  1 person assist;  verbal cues;  bed rails  Bed Mobility Training Sit-to-Supine: maximum assist (25% patient effort);  1 person assist;  verbal cues;  bed rails  Bed Mobility Training Supine-to-Sit: maximum assist (25% patient effort);  1 person assist;  verbal cues;  bed rails  Bed Mobility Training Limitations: decreased strength;  cognitive, decreased safety awareness;  decreased ability to use arms for pushing/pulling;  decreased ability to use legs for bridging/pushing;  impaired ability to control trunk for mobility    Bed-Chair Transfer Training  Transfer Training Bed-to-Chair Transfer: maximum assist (25% patient effort);  1 person assist;  verbal cues;  full weight-bearing   No AD;  Squat pivot  Bed-to-Chair Transfer Training Transfer Safety Analysis: decreased weight-shifting ability;  decreased sequencing ability;  decreased strength;  cognitive, decreased safety awareness;  unable to maintain weight bearing status    Sit-Stand Transfer Training  Transfer Training Sit-to-Stand Transfer: maximum assist (25% patient effort);  1 person assist;  verbal cues;  weight-bearing as tolerated   rolling walker  Transfer Training Stand-to-Sit Transfer: maximum assist (25% patient effort);  1 person assist;  verbal cues;  weight-bearing as tolerated   rolling walker  Sit-to-Stand Transfer Training Transfer Safety Analysis: decreased weight-shifting ability;  decreased sequencing ability;  decreased strength;  unable to maintain weight bearing status;  cognitive, decreased safety awareness;  rolling walker    Gait Training  Gait Training: maximum assist (25% patient effort);  1 person assist;  verbal cues;  weight-bearing as tolerated   rolling walker;  1 Step  Gait Analysis: 3-point gait   shuffling;  decreased strength;  unable to maintain weight bearing status;  cognitive, decreased safety awareness;  rolling walker    REVIEW OF SYSTEMS  Constitutional - No fever,  +fatigue  Neurological - No headaches, +loss of strength, +numbness, No tremors  Musculoskeletal - +joint pain   Psychiatric - +depression, No anxiety    VITALS  T(C): 37.2 (01-19-18 @ 07:48), Max: 37.2 (01-19-18 @ 07:48)  HR: 68 (01-19-18 @ 07:48) (68 - 74)  BP: 128/70 (01-19-18 @ 07:48) (128/70 - 156/62)  RR: 16 (01-19-18 @ 07:48) (16 - 18)  SpO2: 97% (01-19-18 @ 07:48) (96% - 99%)  Wt(kg): --    MEDICATIONS   aluminum hydroxide/magnesium hydroxide/simethicone Suspension 30 milliLiter(s) every 4 hours PRN  amLODIPine   Tablet 10 milliGRAM(s) daily  atorvastatin 40 milliGRAM(s) at bedtime  carvedilol 25 milliGRAM(s) every 12 hours  dextrose 5%. 1000 milliLiter(s) <Continuous>  dextrose 50% Injectable 12.5 Gram(s) once  dextrose 50% Injectable 25 Gram(s) once  dextrose 50% Injectable 25 Gram(s) once  dextrose Gel 1 Dose(s) once PRN  glucagon  Injectable 1 milliGRAM(s) once PRN  hydrALAZINE 25 milliGRAM(s) three times a day  hydrALAZINE Injectable 10 milliGRAM(s) every 4 hours PRN  influenza   Vaccine 0.5 milliLiter(s) once  insulin glargine Injectable (LANTUS) 14 Unit(s) at bedtime  insulin lispro (HumaLOG) corrective regimen sliding scale   three times a day before meals  insulin lispro (HumaLOG) corrective regimen sliding scale   at bedtime  morphine  - Injectable 0.5 milliGRAM(s) every 4 hours PRN  oxyCODONE    5 mG/acetaminophen 325 mG 1 Tablet(s) every 4 hours PRN  pantoprazole    Tablet 40 milliGRAM(s) before breakfast  piperacillin/tazobactam IVPB. 3.375 Gram(s) every 8 hours  sodium chloride 0.9%. 1000 milliLiter(s) <Continuous>      RECENT LABS/IMAGING  CBC Full  -  ( 19 Jan 2018 07:06 )  WBC Count : 11.4 K/uL  Hemoglobin : 11.9 g/dL  Hematocrit : 34.7 %  Platelet Count - Automated : 199 K/uL  Mean Cell Volume : 84.4 fl  Mean Cell Hemoglobin : 29.0 pg  Mean Cell Hemoglobin Concentration : 34.3 g/dL  Auto Neutrophil # : 8.8 K/uL  Auto Lymphocyte # : 1.0 K/uL  Auto Monocyte # : 1.1 K/uL  Auto Eosinophil # : 0.4 K/uL  Auto Basophil # : 0.0 K/uL  Auto Neutrophil % : 77.4 %  Auto Lymphocyte % : 8.8 %  Auto Monocyte % : 9.6 %  Auto Eosinophil % : 3.8 %  Auto Basophil % : 0.2 %    01-19    137  |  98  |  36.0<H>  ----------------------------<  151<H>  4.2   |  23.0  |  2.31<H>    Ca    8.5<L>      19 Jan 2018 07:06  Phos  3.1     01-19  Mg     2.2     01-19    TPro  6.6  /  Alb  3.2<L>  /  TBili  0.9  /  DBili  0.2  /  AST  62<H>  /  ALT  43<H>  /  AlkPhos  223<H>  01-19      -----------------------------------------  PHYSICAL EXAM  Constitutional - NAD, Comfortable  Neurologic Exam -                    Cognitive - Awake, Alert, AAO to self, situation     Communication - +dysarthria     Cranial Nerves - loss left nasolabial fold     Motor                     LEFT    UE - ShAB 1/5, EF 1/5, EE 1/5, WE 1/5,  1/5                    LEFT    LE - HF 1/5, KE 1/5, DF 0/5, PF 0/5     Tone - 3/4 on EF and KE	     Sensory - decreased sensation left UE and LE      Psychiatric - Fatigued, depressed, frustrated  ----------------------------------------------------------------------------------------  ASSESSMENT/PLAN  67M with right thalamic and corona radiata ICH with intraventricular extension likely hypertensive with left hemiparesis, dysphagia, functional, gait and ADL impairments   Acute cholecystitis - Refused intervention IVF, Zosyn   Pain - Morphine, Percocet  Dysphagia - MS/Nectar, currently NPO  DVT PPX - SCDs, Lovenox  Rehab -  Medically/surgically being optimized. Recommend ACUTE inpatient rehabilitation for the functional deficits consisting of 3 hours of therapy/day & 24 hour RN/daily PMR physician for comorbid medical management.     Continue bedside therapy as well as OOB throughout the day with mobilization throughout the day with staff to maintain cardiopulmonary function and prevention of secondary complications related to debility.

## 2018-01-19 NOTE — PROGRESS NOTE ADULT - ASSESSMENT
67 yom transferred from [place] after having left sided weakness found to have ICH likely hypertensive. Pt was initially managed in ICU until stable, stroke workup complete, neurology following.  Cardio decided to cath pt yesterday as part of workup and pt was NPO, post procedure he was found to be extremely hypertensive with HTN crisis and was brought to ICU overnight last night for BP mgt.  Pt was found to have a 70% midLAD lesion that will be medically managed for now due to fact that he is currently not candidate for ASA/Plavix due to ICH> spoke with cardio NP and pt not on ASA/Plavix at this time due to ICH and they are aware of this, eventually they will d/w neurology/NS about when it will be safe to restart these meds.  Dx: HTN crisis, s/p ICH due to prior episode HTN crisis, CVA, CAD. s/p ICU downgrade. Hospital course complicated by abdominal pain, N/V suspected acute brooks

## 2018-01-19 NOTE — PROGRESS NOTE ADULT - ASSESSMENT
CKD(III) - DM, HTN (CT angio shows no gross BRIAN or adrenal lesions)  Thalamic bleed  s/p cardiac cath  ARF now==> r/o BRIANNA, renal hypoperfusion, meds  - holding ACE and aldactone  - IVF  - monitor labs    Acute cholycystitis:   - cont IVF and antibiotics  - IR noted; cholycystostomy tube tomorrow CKD(III) - DM, HTN (CT angio shows no gross BRIAN or adrenal lesions)  Thalamic bleed  s/p cardiac cath  ARF now==> r/o BRIANNA, renal hypoperfusion, meds  - holding ACE and aldactone  - IVF  - monitor labs    Acute cholecystitis:   - cont IVF and antibiotics  - IR noted; cholecystostomy tube ?

## 2018-01-19 NOTE — BRIEF OPERATIVE NOTE - PROCEDURE
<<-----Click on this checkbox to enter Procedure Cholecystostomy, percutaneous, w image guidance  01/19/2018    Active  HALPER

## 2018-01-19 NOTE — PROGRESS NOTE ADULT - PROBLEM SELECTOR PLAN 1
Pt refusing cholecystostomy procedure. Was sent back up from IR.  Vilma at bedside. When asked why he doesnt want it he said because he is fine. When examined pt had 10/10 RUQ pain with Rebolledo's positive. Pt was explained about risks of not having it done, including death. Agreeable to procedure now. Dr. Flores informed. No N/V. WBC & LFTs trending up   +Rebolledo's, diffuse abd tenderness to palp (highest in RUQ), N/V , leukocytosis, suspected acute brooks  Abd US: Gallbladder with Markedly distended with sludge and stones. Extensive wall thickening and pericholecystic inflammatory changes. Positive sonographic   Rebolledo sign. Cholelithiasis and Acute cholecystitis highly suspected.  No fevers yet  ACS consult appreciated  c/w NPO  IVF  IV Zosyn started (renal dose)  Blood cx  Vitals stable

## 2018-01-19 NOTE — PROGRESS NOTE ADULT - SUBJECTIVE AND OBJECTIVE BOX
NEPHROLOGY INTERVAL HPI/OVERNIGHT EVENTS:  pt seen earlier  no acute distress; no cp, sob, n/v/d  reportedly refused IR placement of biliary tube    MEDICATIONS  (STANDING):  amLODIPine   Tablet 10 milliGRAM(s) Oral daily  atorvastatin 40 milliGRAM(s) Oral at bedtime  carvedilol 25 milliGRAM(s) Oral every 12 hours  dextrose 5%. 1000 milliLiter(s) (50 mL/Hr) IV Continuous <Continuous>  dextrose 50% Injectable 12.5 Gram(s) IV Push once  dextrose 50% Injectable 25 Gram(s) IV Push once  dextrose 50% Injectable 25 Gram(s) IV Push once  hydrALAZINE 25 milliGRAM(s) Oral three times a day  influenza   Vaccine 0.5 milliLiter(s) IntraMuscular once  insulin glargine Injectable (LANTUS) 14 Unit(s) SubCutaneous at bedtime  insulin lispro (HumaLOG) corrective regimen sliding scale   SubCutaneous three times a day before meals  insulin lispro (HumaLOG) corrective regimen sliding scale   SubCutaneous at bedtime  pantoprazole    Tablet 40 milliGRAM(s) Oral before breakfast  piperacillin/tazobactam IVPB. 3.375 Gram(s) IV Intermittent every 8 hours  sodium chloride 0.9%. 1000 milliLiter(s) (75 mL/Hr) IV Continuous <Continuous>    MEDICATIONS  (PRN):  aluminum hydroxide/magnesium hydroxide/simethicone Suspension 30 milliLiter(s) Oral every 4 hours PRN Dyspepsia  dextrose Gel 1 Dose(s) Oral once PRN Blood Glucose LESS THAN 70 milliGRAM(s)/deciliter  glucagon  Injectable 1 milliGRAM(s) IntraMuscular once PRN Glucose LESS THAN 70 milligrams/deciliter  hydrALAZINE Injectable 10 milliGRAM(s) IV Push every 4 hours PRN sbp>160  morphine  - Injectable 0.5 milliGRAM(s) IV Push every 4 hours PRN Moderate Pain (4 - 6)  oxyCODONE    5 mG/acetaminophen 325 mG 1 Tablet(s) Oral every 4 hours PRN Moderate Pain (4 - 6)      Allergies    No Known Allergies          Vital Signs Last 24 Hrs  T(C): 37.2 (19 Jan 2018 07:48), Max: 37.2 (19 Jan 2018 07:48)  T(F): 98.9 (19 Jan 2018 07:48), Max: 98.9 (19 Jan 2018 07:48)  HR: 68 (19 Jan 2018 07:48) (68 - 74)  BP: 128/70 (19 Jan 2018 07:48) (128/70 - 156/62)  BP(mean): --  RR: 16 (19 Jan 2018 07:48) (16 - 18)  SpO2: 97% (19 Jan 2018 07:48) (96% - 99%)    PHYSICAL EXAM:  GENERAL: NAD  HEAD:  NC/AT  NECK: Supple, No JVD,   NERVOUS SYSTEM:  L hemiplegia   CHEST/LUNG: Clear with diminished BS at bases  HEART: Regular rate; no rub  ABDOMEN: + RUQ tender  EXTREMITIES:  No edema     LABS:                        11.9   11.4  )-----------( 199      ( 19 Jan 2018 07:06 )             34.7     01-19    137  |  98  |  36.0<H>  ----------------------------<  151<H>  4.2   |  23.0  |  2.31<H>    Creatinine, Serum: 2.62 mg/dL (01.18.18 @ 06:36)    Ca    8.5<L>      19 Jan 2018 07:06  Phos  3.1     01-19  Mg     2.2     01-19    TPro  6.6  /  Alb  3.2<L>  /  TBili  0.9  /  DBili  0.2  /  AST  62<H>  /  ALT  43<H>  /  AlkPhos  223<H>  01-19    PT/INR - ( 19 Jan 2018 07:06 )   PT: 11.9 sec;   INR: 1.08 ratio         PTT - ( 19 Jan 2018 07:06 )  PTT:30.0 sec    Magnesium, Serum: 2.2 mg/dL (01-19 @ 07:06)  Phosphorus Level, Serum: 3.1 mg/dL (01-19 @ 07:06)      RADIOLOGY & ADDITIONAL TESTS:  < from: US Abdomen Complete (01.18.18 @ 09:58) >     EXAM:  US ABDOMEN COMPLETE                          PROCEDURE DATE:  01/18/2018          INTERPRETATION:  CLINICAL INFORMATION: abd pain, RUQ pain,  N/V    COMPARISON: CT 1/14    TECHNIQUE: Sonography of the abdomen.     FINDINGS:    Liver: Withinnormal limits.    Bile ducts: Normal caliber. Common bile duct measures 5 mm.     Gallbladder: Markedly distended with sludge and stones. Extensive wall   thickening and pericholecystic inflammatory changes. Positive sonographic   Rebolledo sign.        Pancreas: Limited    Spleen: 10.3 cm. Within normal limits.    Right kidney: 9.2 cm. No hydronephrosis. Multiple cysts, some septated   the largest 1.3 cm.    Left kidney: 11.6 cm.  No hydronephrosis. Multiple cysts, some septated   the largest 6.6 cm.    Both kidneys have increased echogenicity.    Ascites: None.    Aorta and IVC: Extensively atherosclerotic aorta.    IMPRESSION:     Cholelithiasis and Acute cholecystitis highly suspected. Other incidental   comments as above.    < end of copied text >

## 2018-01-19 NOTE — PROGRESS NOTE ADULT - PROBLEM SELECTOR PLAN 2
Likely related to uncontrolled HTN  repeat CT head with stable ICH  NSG signed off, neurology consulted  stroke workup completed  will need acute rehab on d/c  PT/OT evals ordered- Acute rehab when stable  DVT ppx resumed

## 2018-01-19 NOTE — PROGRESS NOTE ADULT - SUBJECTIVE AND OBJECTIVE BOX
CHIEF COMPLAINT/INTERVAL HISTORY:    Patient is a 67y old  Male who presents with a chief complaint of stroke (11 Jan 2018 21:41)      HPI:  66M transferred from [facility]. PMHX of HTN He presented AMS. Head Ct showed a Right Thalamic Hemorrhagic Bleed with some Effacement of the Lateral Ventricle and surrounding Edema.  He was brought to [facility] for further management and Neurosurgical evaluation. His BP in the ER was 170 and he was placed on a Cardene Infusion. (11 Jan 2018 19:20)      SUBJECTIVE & OBJECTIVE/ ROS: Pt seen and examined at bedside. Pt refusing cholecystostomy procedure. Was sent back up from IR.  Vilma at bedside. When asked why he doesnt want it he said because he is fine. When examined pt had 10/10 RUQ pain with Rebolledo's positive. Pt was explained about risks of not having it done, including death. Agreeable to procedure now. Dr. Flores informed. No N/V. WBC & LFTs trending up     ICU Vital Signs Last 24 Hrs  T(C): 36.7 (19 Jan 2018 10:30), Max: 37.2 (19 Jan 2018 07:48)  T(F): 98.1 (19 Jan 2018 10:30), Max: 98.9 (19 Jan 2018 07:48)  HR: 61 (19 Jan 2018 10:30) (61 - 74)  BP: 137/71 (19 Jan 2018 10:30) (128/70 - 156/62)  BP(mean): --  ABP: --  ABP(mean): --  RR: 17 (19 Jan 2018 10:30) (16 - 18)  SpO2: 97% (19 Jan 2018 07:48) (96% - 99%)        MEDICATIONS  (STANDING):  amLODIPine   Tablet 10 milliGRAM(s) Oral daily  atorvastatin 40 milliGRAM(s) Oral at bedtime  carvedilol 25 milliGRAM(s) Oral every 12 hours  dextrose 5%. 1000 milliLiter(s) (50 mL/Hr) IV Continuous <Continuous>  dextrose 50% Injectable 12.5 Gram(s) IV Push once  dextrose 50% Injectable 25 Gram(s) IV Push once  dextrose 50% Injectable 25 Gram(s) IV Push once  hydrALAZINE 25 milliGRAM(s) Oral three times a day  influenza   Vaccine 0.5 milliLiter(s) IntraMuscular once  insulin glargine Injectable (LANTUS) 14 Unit(s) SubCutaneous at bedtime  insulin lispro (HumaLOG) corrective regimen sliding scale   SubCutaneous three times a day before meals  insulin lispro (HumaLOG) corrective regimen sliding scale   SubCutaneous at bedtime  pantoprazole    Tablet 40 milliGRAM(s) Oral before breakfast  piperacillin/tazobactam IVPB. 3.375 Gram(s) IV Intermittent every 8 hours  sodium chloride 0.9%. 1000 milliLiter(s) (75 mL/Hr) IV Continuous <Continuous>    MEDICATIONS  (PRN):  aluminum hydroxide/magnesium hydroxide/simethicone Suspension 30 milliLiter(s) Oral every 4 hours PRN Dyspepsia  dextrose Gel 1 Dose(s) Oral once PRN Blood Glucose LESS THAN 70 milliGRAM(s)/deciliter  glucagon  Injectable 1 milliGRAM(s) IntraMuscular once PRN Glucose LESS THAN 70 milligrams/deciliter  hydrALAZINE Injectable 10 milliGRAM(s) IV Push every 4 hours PRN sbp>160  morphine  - Injectable 0.5 milliGRAM(s) IV Push every 4 hours PRN Moderate Pain (4 - 6)  oxyCODONE    5 mG/acetaminophen 325 mG 1 Tablet(s) Oral every 4 hours PRN Moderate Pain (4 - 6)      LABS:                        11.9   11.4  )-----------( 199      ( 19 Jan 2018 07:06 )             34.7     01-19    137  |  98  |  36.0<H>  ----------------------------<  151<H>  4.2   |  23.0  |  2.31<H>    Ca    8.5<L>      19 Jan 2018 07:06  Phos  3.1     01-19  Mg     2.2     01-19    TPro  6.6  /  Alb  3.2<L>  /  TBili  0.9  /  DBili  0.2  /  AST  62<H>  /  ALT  43<H>  /  AlkPhos  223<H>  01-19    PT/INR - ( 19 Jan 2018 07:06 )   PT: 11.9 sec;   INR: 1.08 ratio         PTT - ( 19 Jan 2018 07:06 )  PTT:30.0 sec      CAPILLARY BLOOD GLUCOSE      POCT Blood Glucose.: 114 mg/dL (19 Jan 2018 12:28)  POCT Blood Glucose.: 180 mg/dL (19 Jan 2018 08:17)  POCT Blood Glucose.: 221 mg/dL (18 Jan 2018 22:40)  POCT Blood Glucose.: 333 mg/dL (18 Jan 2018 22:37)  POCT Blood Glucose.: 125 mg/dL (18 Jan 2018 17:20)      RECENT CULTURES:      RADIOLOGY & ADDITIONAL TESTS:      PHYSICAL EXAM:  GENERAL: NAD  HEENT: EOMI  Neck: supple  CHEST/LUNG: CTA b/l   HEART: S1S2+ audible  ABDOMEN: Soft, +Rebolledo's, diffuse abd tenderness to palp (highest in RUQ), Nondistended; Bowel sounds present  EXTREMITIES:  no edema  CNS: AAO X 3, left sided hemiplegia

## 2018-01-20 LAB
ANION GAP SERPL CALC-SCNC: 15 MMOL/L — SIGNIFICANT CHANGE UP (ref 5–17)
BASOPHILS # BLD AUTO: 0 K/UL — SIGNIFICANT CHANGE UP (ref 0–0.2)
BASOPHILS NFR BLD AUTO: 0.2 % — SIGNIFICANT CHANGE UP (ref 0–2)
BUN SERPL-MCNC: 31 MG/DL — HIGH (ref 8–20)
CALCIUM SERPL-MCNC: 8.5 MG/DL — LOW (ref 8.6–10.2)
CHLORIDE SERPL-SCNC: 104 MMOL/L — SIGNIFICANT CHANGE UP (ref 98–107)
CO2 SERPL-SCNC: 21 MMOL/L — LOW (ref 22–29)
CREAT SERPL-MCNC: 2.19 MG/DL — HIGH (ref 0.5–1.3)
EOSINOPHIL # BLD AUTO: 0.6 K/UL — HIGH (ref 0–0.5)
EOSINOPHIL NFR BLD AUTO: 6.4 % — HIGH (ref 0–5)
GLUCOSE BLDC GLUCOMTR-MCNC: 128 MG/DL — HIGH (ref 70–99)
GLUCOSE BLDC GLUCOMTR-MCNC: 138 MG/DL — HIGH (ref 70–99)
GLUCOSE BLDC GLUCOMTR-MCNC: 159 MG/DL — HIGH (ref 70–99)
GLUCOSE BLDC GLUCOMTR-MCNC: 205 MG/DL — HIGH (ref 70–99)
GLUCOSE SERPL-MCNC: 106 MG/DL — SIGNIFICANT CHANGE UP (ref 70–115)
HCT VFR BLD CALC: 30.5 % — LOW (ref 42–52)
HGB BLD-MCNC: 10.3 G/DL — LOW (ref 14–18)
LYMPHOCYTES # BLD AUTO: 1.3 K/UL — SIGNIFICANT CHANGE UP (ref 1–4.8)
LYMPHOCYTES # BLD AUTO: 15.6 % — LOW (ref 20–55)
MAGNESIUM SERPL-MCNC: 2.2 MG/DL — SIGNIFICANT CHANGE UP (ref 1.8–2.6)
MCHC RBC-ENTMCNC: 28.4 PG — SIGNIFICANT CHANGE UP (ref 27–31)
MCHC RBC-ENTMCNC: 33.8 G/DL — SIGNIFICANT CHANGE UP (ref 32–36)
MCV RBC AUTO: 84 FL — SIGNIFICANT CHANGE UP (ref 80–94)
METANEPHRINE, PL: <10 PG/ML — SIGNIFICANT CHANGE UP (ref 0–62)
MONOCYTES # BLD AUTO: 1 K/UL — HIGH (ref 0–0.8)
MONOCYTES NFR BLD AUTO: 11.7 % — HIGH (ref 3–10)
NEUTROPHILS # BLD AUTO: 5.6 K/UL — SIGNIFICANT CHANGE UP (ref 1.8–8)
NEUTROPHILS NFR BLD AUTO: 65.9 % — SIGNIFICANT CHANGE UP (ref 37–73)
NORMETANEPHRINE, PL: 16 PG/ML — SIGNIFICANT CHANGE UP (ref 0–145)
PHOSPHATE SERPL-MCNC: 3.1 MG/DL — SIGNIFICANT CHANGE UP (ref 2.4–4.7)
PLATELET # BLD AUTO: 231 K/UL — SIGNIFICANT CHANGE UP (ref 150–400)
POTASSIUM SERPL-MCNC: 4.1 MMOL/L — SIGNIFICANT CHANGE UP (ref 3.5–5.3)
POTASSIUM SERPL-SCNC: 4.1 MMOL/L — SIGNIFICANT CHANGE UP (ref 3.5–5.3)
RBC # BLD: 3.63 M/UL — LOW (ref 4.6–6.2)
RBC # FLD: 12.9 % — SIGNIFICANT CHANGE UP (ref 11–15.6)
RENIN PLAS-CCNC: 10.26 NG/ML/HR — HIGH (ref 0.17–5.38)
SODIUM SERPL-SCNC: 140 MMOL/L — SIGNIFICANT CHANGE UP (ref 135–145)
WBC # BLD: 8.6 K/UL — SIGNIFICANT CHANGE UP (ref 4.8–10.8)
WBC # FLD AUTO: 8.6 K/UL — SIGNIFICANT CHANGE UP (ref 4.8–10.8)

## 2018-01-20 PROCEDURE — 74019 RADEX ABDOMEN 2 VIEWS: CPT | Mod: 26

## 2018-01-20 PROCEDURE — 99233 SBSQ HOSP IP/OBS HIGH 50: CPT

## 2018-01-20 RX ORDER — DOCUSATE SODIUM 100 MG
100 CAPSULE ORAL THREE TIMES A DAY
Refills: 0 | Status: DISCONTINUED | OUTPATIENT
Start: 2018-01-20 | End: 2018-01-24

## 2018-01-20 RX ORDER — POLYETHYLENE GLYCOL 3350 17 G/17G
17 POWDER, FOR SOLUTION ORAL DAILY
Refills: 0 | Status: DISCONTINUED | OUTPATIENT
Start: 2018-01-20 | End: 2018-01-24

## 2018-01-20 RX ORDER — SENNA PLUS 8.6 MG/1
2 TABLET ORAL AT BEDTIME
Refills: 0 | Status: DISCONTINUED | OUTPATIENT
Start: 2018-01-20 | End: 2018-01-24

## 2018-01-20 RX ADMIN — CARVEDILOL PHOSPHATE 25 MILLIGRAM(S): 80 CAPSULE, EXTENDED RELEASE ORAL at 17:07

## 2018-01-20 RX ADMIN — PANTOPRAZOLE SODIUM 40 MILLIGRAM(S): 20 TABLET, DELAYED RELEASE ORAL at 05:46

## 2018-01-20 RX ADMIN — AMLODIPINE BESYLATE 10 MILLIGRAM(S): 2.5 TABLET ORAL at 05:46

## 2018-01-20 RX ADMIN — SENNA PLUS 2 TABLET(S): 8.6 TABLET ORAL at 22:32

## 2018-01-20 RX ADMIN — PIPERACILLIN AND TAZOBACTAM 25 GRAM(S): 4; .5 INJECTION, POWDER, LYOPHILIZED, FOR SOLUTION INTRAVENOUS at 05:45

## 2018-01-20 RX ADMIN — Medication 25 MILLIGRAM(S): at 22:24

## 2018-01-20 RX ADMIN — Medication 25 MILLIGRAM(S): at 13:44

## 2018-01-20 RX ADMIN — INSULIN GLARGINE 14 UNIT(S): 100 INJECTION, SOLUTION SUBCUTANEOUS at 22:22

## 2018-01-20 RX ADMIN — OXYCODONE AND ACETAMINOPHEN 1 TABLET(S): 5; 325 TABLET ORAL at 13:57

## 2018-01-20 RX ADMIN — Medication 2: at 16:56

## 2018-01-20 RX ADMIN — Medication 100 MILLIGRAM(S): at 22:24

## 2018-01-20 RX ADMIN — PIPERACILLIN AND TAZOBACTAM 25 GRAM(S): 4; .5 INJECTION, POWDER, LYOPHILIZED, FOR SOLUTION INTRAVENOUS at 22:22

## 2018-01-20 RX ADMIN — Medication 25 MILLIGRAM(S): at 05:46

## 2018-01-20 RX ADMIN — SODIUM CHLORIDE 75 MILLILITER(S): 9 INJECTION INTRAMUSCULAR; INTRAVENOUS; SUBCUTANEOUS at 22:23

## 2018-01-20 RX ADMIN — MORPHINE SULFATE 0.5 MILLIGRAM(S): 50 CAPSULE, EXTENDED RELEASE ORAL at 07:32

## 2018-01-20 RX ADMIN — MORPHINE SULFATE 0.5 MILLIGRAM(S): 50 CAPSULE, EXTENDED RELEASE ORAL at 06:51

## 2018-01-20 RX ADMIN — POLYETHYLENE GLYCOL 3350 17 GRAM(S): 17 POWDER, FOR SOLUTION ORAL at 22:22

## 2018-01-20 RX ADMIN — PIPERACILLIN AND TAZOBACTAM 25 GRAM(S): 4; .5 INJECTION, POWDER, LYOPHILIZED, FOR SOLUTION INTRAVENOUS at 13:44

## 2018-01-20 RX ADMIN — OXYCODONE AND ACETAMINOPHEN 1 TABLET(S): 5; 325 TABLET ORAL at 14:45

## 2018-01-20 RX ADMIN — ATORVASTATIN CALCIUM 40 MILLIGRAM(S): 80 TABLET, FILM COATED ORAL at 22:22

## 2018-01-20 RX ADMIN — Medication 100 MILLIGRAM(S): at 13:57

## 2018-01-20 RX ADMIN — CARVEDILOL PHOSPHATE 25 MILLIGRAM(S): 80 CAPSULE, EXTENDED RELEASE ORAL at 05:46

## 2018-01-20 NOTE — PROGRESS NOTE ADULT - SUBJECTIVE AND OBJECTIVE BOX
CHIEF COMPLAINT/INTERVAL HISTORY:    Patient is a 67y old  Male who presents with a chief complaint of stroke (11 Jan 2018 21:41)      HPI:  66M transferred from [facility]. PMHX of HTN He presented AMS. Head Ct showed a Right Thalamic Hemorrhagic Bleed with some Effacement of the Lateral Ventricle and surrounding Edema.  He was brought to [facility] for further management and Neurosurgical evaluation. His BP in the ER was 170 and he was placed on a Cardene Infusion. (11 Jan 2018 19:20)      SUBJECTIVE & OBJECTIVE/ ROS: Pt seen and examined at bedside.  [id number]# used. c/o no BM x 3 days,  abd pain, obstipation. Denies any chest pain, palpitations, sob, light headedness/dizziness, difficulty breathing/cough, fevers/chills, n/v, diarrhea, dysuria or increased urinary frequency.     ICU Vital Signs Last 24 Hrs  T(C): 37.2 (20 Jan 2018 10:08), Max: 37.3 (19 Jan 2018 21:11)  T(F): 98.9 (20 Jan 2018 10:08), Max: 99.1 (19 Jan 2018 21:11)  HR: 84 (20 Jan 2018 10:08) (65 - 84)  BP: 125/58 (20 Jan 2018 10:08) (118/62 - 158/74)  BP(mean): --  ABP: --  ABP(mean): --  RR: 18 (20 Jan 2018 10:08) (18 - 18)  SpO2: 95% (20 Jan 2018 05:34) (95% - 98%)        MEDICATIONS  (STANDING):  amLODIPine   Tablet 10 milliGRAM(s) Oral daily  atorvastatin 40 milliGRAM(s) Oral at bedtime  carvedilol 25 milliGRAM(s) Oral every 12 hours  dextrose 5%. 1000 milliLiter(s) (50 mL/Hr) IV Continuous <Continuous>  dextrose 50% Injectable 12.5 Gram(s) IV Push once  dextrose 50% Injectable 25 Gram(s) IV Push once  dextrose 50% Injectable 25 Gram(s) IV Push once  docusate sodium 100 milliGRAM(s) Oral three times a day  hydrALAZINE 25 milliGRAM(s) Oral three times a day  influenza   Vaccine 0.5 milliLiter(s) IntraMuscular once  insulin glargine Injectable (LANTUS) 14 Unit(s) SubCutaneous at bedtime  insulin lispro (HumaLOG) corrective regimen sliding scale   SubCutaneous three times a day before meals  insulin lispro (HumaLOG) corrective regimen sliding scale   SubCutaneous at bedtime  pantoprazole    Tablet 40 milliGRAM(s) Oral before breakfast  piperacillin/tazobactam IVPB. 3.375 Gram(s) IV Intermittent every 8 hours  polyethylene glycol 3350 17 Gram(s) Oral daily  senna 2 Tablet(s) Oral at bedtime  sodium chloride 0.9%. 1000 milliLiter(s) (75 mL/Hr) IV Continuous <Continuous>    MEDICATIONS  (PRN):  aluminum hydroxide/magnesium hydroxide/simethicone Suspension 30 milliLiter(s) Oral every 4 hours PRN Dyspepsia  dextrose Gel 1 Dose(s) Oral once PRN Blood Glucose LESS THAN 70 milliGRAM(s)/deciliter  glucagon  Injectable 1 milliGRAM(s) IntraMuscular once PRN Glucose LESS THAN 70 milligrams/deciliter  hydrALAZINE Injectable 10 milliGRAM(s) IV Push every 4 hours PRN sbp>160  morphine  - Injectable 0.5 milliGRAM(s) IV Push every 4 hours PRN Moderate Pain (4 - 6)  oxyCODONE    5 mG/acetaminophen 325 mG 1 Tablet(s) Oral every 4 hours PRN Moderate Pain (4 - 6)      LABS:                        10.3   8.6   )-----------( 231      ( 20 Jan 2018 05:59 )             30.5     01-20    140  |  104  |  31.0<H>  ----------------------------<  106  4.1   |  21.0<L>  |  2.19<H>    Ca    8.5<L>      20 Jan 2018 05:59  Phos  3.1     01-20  Mg     2.2     01-20    TPro  6.6  /  Alb  3.2<L>  /  TBili  0.9  /  DBili  0.2  /  AST  62<H>  /  ALT  43<H>  /  AlkPhos  223<H>  01-19    PT/INR - ( 19 Jan 2018 07:06 )   PT: 11.9 sec;   INR: 1.08 ratio         PTT - ( 19 Jan 2018 07:06 )  PTT:30.0 sec      CAPILLARY BLOOD GLUCOSE      POCT Blood Glucose.: 128 mg/dL (20 Jan 2018 12:32)  POCT Blood Glucose.: 138 mg/dL (20 Jan 2018 08:09)  POCT Blood Glucose.: 277 mg/dL (19 Jan 2018 21:55)  POCT Blood Glucose.: 127 mg/dL (19 Jan 2018 17:53)      RECENT CULTURES:      RADIOLOGY & ADDITIONAL TESTS:      PHYSICAL EXAM:    PHYSICAL EXAM:  GENERAL: NAD  HEENT: EOMI  Neck: supple  CHEST/LUNG: CTA b/l   HEART: S1S2+ audible  ABDOMEN: Soft, +Rebolledo's, diffuse abd tenderness to palp (highest in RUQ), Nondistended; Bowel sounds present  EXTREMITIES:  no edema  CNS: AAO X 3, left sided hemiplegia

## 2018-01-20 NOTE — CHART NOTE - NSCHARTNOTEFT_GEN_A_CORE
Source: Patient [x ]  Family [ ]   other [x ] EMR    Pt s/p CVA complicated by cholecystitis- refused brooks, now with drain.    Current Diet: Diet, Dysphagia 2 Mechanical Soft-Nectar Consistency Fluid (01-19-18 @ 18:16)    PO intake:  Pt reports good po intake at meals, tolerating consistency    Current Weight: 167# (1/11) 161#    % Weight Change - no wt loss noted    Pertinent Medications: MEDICATIONS  (STANDING):  amLODIPine   Tablet 10 milliGRAM(s) Oral daily  atorvastatin 40 milliGRAM(s) Oral at bedtime  carvedilol 25 milliGRAM(s) Oral every 12 hours  dextrose 5%. 1000 milliLiter(s) (50 mL/Hr) IV Continuous <Continuous>  dextrose 50% Injectable 12.5 Gram(s) IV Push once  dextrose 50% Injectable 25 Gram(s) IV Push once  dextrose 50% Injectable 25 Gram(s) IV Push once  hydrALAZINE 25 milliGRAM(s) Oral three times a day  influenza   Vaccine 0.5 milliLiter(s) IntraMuscular once  insulin glargine Injectable (LANTUS) 14 Unit(s) SubCutaneous at bedtime  insulin lispro (HumaLOG) corrective regimen sliding scale   SubCutaneous three times a day before meals  insulin lispro (HumaLOG) corrective regimen sliding scale   SubCutaneous at bedtime  pantoprazole    Tablet 40 milliGRAM(s) Oral before breakfast  piperacillin/tazobactam IVPB. 3.375 Gram(s) IV Intermittent every 8 hours  sodium chloride 0.9%. 1000 milliLiter(s) (75 mL/Hr) IV Continuous <Continuous>    MEDICATIONS  (PRN):  aluminum hydroxide/magnesium hydroxide/simethicone Suspension 30 milliLiter(s) Oral every 4 hours PRN Dyspepsia  dextrose Gel 1 Dose(s) Oral once PRN Blood Glucose LESS THAN 70 milliGRAM(s)/deciliter  glucagon  Injectable 1 milliGRAM(s) IntraMuscular once PRN Glucose LESS THAN 70 milligrams/deciliter  hydrALAZINE Injectable 10 milliGRAM(s) IV Push every 4 hours PRN sbp>160  morphine  - Injectable 0.5 milliGRAM(s) IV Push every 4 hours PRN Moderate Pain (4 - 6)  oxyCODONE    5 mG/acetaminophen 325 mG 1 Tablet(s) Oral every 4 hours PRN Moderate Pain (4 - 6)    Pertinent Labs: CBC Full  -  ( 20 Jan 2018 05:59 )  WBC Count : 8.6 K/uL  Hemoglobin : 10.3 g/dL  Hematocrit : 30.5 %  Platelet Count - Automated : 231 K/uL  Mean Cell Volume : 84.0 fl  Mean Cell Hemoglobin : 28.4 pg  Mean Cell Hemoglobin Concentration : 33.8 g/dL  Auto Neutrophil # : 5.6 K/uL  Auto Lymphocyte # : 1.3 K/uL  Auto Monocyte # : 1.0 K/uL  Auto Eosinophil # : 0.6 K/uL  Auto Basophil # : 0.0 K/uL  Auto Neutrophil % : 65.9 %  Auto Lymphocyte % : 15.6 %  Auto Monocyte % : 11.7 %  Auto Eosinophil % : 6.4 %  Auto Basophil % : 0.2 %  01-20 Na140 mmol/L Glu 106 mg/dL K+ 4.1 mmol/L Cr  2.19 mg/dL<H> BUN 31.0 mg/dL<H> Phos 3.1 mg/dL Alb n/a   PAB n/a       Skin: sx incision right groin    Nutrition focused physical exam conducted - found signs of malnutrition [x ]absent [ ]present    Subcutaneous fat loss: [ ] Orbital fat pads region, [ ]Buccal fat region, [ ]Triceps region,  [ ]Ribs region    Muscle wasting: [ ]Temples region, [ ]Clavicle region, [ ]Shoulder region, [ ]Scapula region, [ ]Interosseous region,  [ ]thigh region, [ ]Calf region    Estimated Needs:   [x ] no change since previous assessment  [ ] recalculated:     Current Nutrition Diagnosis: Pt remains at nutrition risk secondary to difficulty swallowing related to s/p CVA as evidenced by pt needs dysphagia 2 mechanical soft with nectar diet per SLP recommendations.    Pt declined nutrition education- more concerned about using the bathroom at this time.  RD to remain available.    Recommendations:   Add cons cho, DASH/TLC to diet rx    Monitoring and Evaluation:   [x ] PO intake [x ] Tolerance to diet prescription [X] Weights  [X] Follow up per protocol [X] Labs:

## 2018-01-20 NOTE — PROGRESS NOTE ADULT - SUBJECTIVE AND OBJECTIVE BOX
S/P stroke  acute brooks  Perc brooks done   Pt lethargic but arousable  abd Benign  TUbe draining  supportive care

## 2018-01-20 NOTE — PROGRESS NOTE ADULT - SUBJECTIVE AND OBJECTIVE BOX
INTERVAL HPI/OVERNIGHT EVENTS: No acute events overnight. Patient tolerated percutaneous cholecystostomy tube well. Drained about 380 cc fluid overnight. No fevers, chills, chest pain, dyspnea. Tolerating regular diet without n/v/d.       MEDICATIONS  (STANDING):  amLODIPine   Tablet 10 milliGRAM(s) Oral daily  atorvastatin 40 milliGRAM(s) Oral at bedtime  carvedilol 25 milliGRAM(s) Oral every 12 hours  dextrose 5%. 1000 milliLiter(s) (50 mL/Hr) IV Continuous <Continuous>  dextrose 50% Injectable 12.5 Gram(s) IV Push once  dextrose 50% Injectable 25 Gram(s) IV Push once  dextrose 50% Injectable 25 Gram(s) IV Push once  docusate sodium 100 milliGRAM(s) Oral three times a day  hydrALAZINE 25 milliGRAM(s) Oral three times a day  influenza   Vaccine 0.5 milliLiter(s) IntraMuscular once  insulin glargine Injectable (LANTUS) 14 Unit(s) SubCutaneous at bedtime  insulin lispro (HumaLOG) corrective regimen sliding scale   SubCutaneous three times a day before meals  insulin lispro (HumaLOG) corrective regimen sliding scale   SubCutaneous at bedtime  pantoprazole    Tablet 40 milliGRAM(s) Oral before breakfast  piperacillin/tazobactam IVPB. 3.375 Gram(s) IV Intermittent every 8 hours  polyethylene glycol 3350 17 Gram(s) Oral daily  senna 2 Tablet(s) Oral at bedtime  sodium chloride 0.9%. 1000 milliLiter(s) (75 mL/Hr) IV Continuous <Continuous>    MEDICATIONS  (PRN):  aluminum hydroxide/magnesium hydroxide/simethicone Suspension 30 milliLiter(s) Oral every 4 hours PRN Dyspepsia  dextrose Gel 1 Dose(s) Oral once PRN Blood Glucose LESS THAN 70 milliGRAM(s)/deciliter  glucagon  Injectable 1 milliGRAM(s) IntraMuscular once PRN Glucose LESS THAN 70 milligrams/deciliter  hydrALAZINE Injectable 10 milliGRAM(s) IV Push every 4 hours PRN sbp>160  morphine  - Injectable 0.5 milliGRAM(s) IV Push every 4 hours PRN Moderate Pain (4 - 6)  oxyCODONE    5 mG/acetaminophen 325 mG 1 Tablet(s) Oral every 4 hours PRN Moderate Pain (4 - 6)      Vital Signs Last 24 Hrs  T(C): 37.2 (20 Jan 2018 10:08), Max: 37.3 (19 Jan 2018 21:11)  T(F): 98.9 (20 Jan 2018 10:08), Max: 99.1 (19 Jan 2018 21:11)  HR: 84 (20 Jan 2018 10:08) (65 - 84)  BP: 125/58 (20 Jan 2018 10:08) (118/62 - 158/74)  BP(mean): --  RR: 18 (20 Jan 2018 10:08) (18 - 18)  SpO2: 95% (20 Jan 2018 05:34) (95% - 98%)    NAD, AOx3, resting comfortably in bed  No respiratory distress  Abdomen soft, nontender, nondistended  Cholecystostomy tube in R abdomen, draining bile  No peripheral edema. Normal ROM.      I&O's Detail    19 Jan 2018 07:01  -  20 Jan 2018 07:00  --------------------------------------------------------  IN:    Oral Fluid: 490 mL    sodium chloride 0.9%.: 1375 mL    Solution: 275 mL  Total IN: 2140 mL    OUT:    Drain: 380 mL    Voided: 1200 mL  Total OUT: 1580 mL    Total NET: 560 mL      20 Jan 2018 07:01  -  20 Jan 2018 13:05  --------------------------------------------------------  IN:    Oral Fluid: 490 mL    sodium chloride 0.9%.: 375 mL    Solution: 100 mL  Total IN: 965 mL    OUT:    Drain: 100 mL  Total OUT: 100 mL    Total NET: 865 mL          LABS:                        10.3   8.6   )-----------( 231      ( 20 Jan 2018 05:59 )             30.5     01-20    140  |  104  |  31.0<H>  ----------------------------<  106  4.1   |  21.0<L>  |  2.19<H>    Ca    8.5<L>      20 Jan 2018 05:59  Phos  3.1     01-20  Mg     2.2     01-20    TPro  6.6  /  Alb  3.2<L>  /  TBili  0.9  /  DBili  0.2  /  AST  62<H>  /  ALT  43<H>  /  AlkPhos  223<H>  01-19    PT/INR - ( 19 Jan 2018 07:06 )   PT: 11.9 sec;   INR: 1.08 ratio         PTT - ( 19 Jan 2018 07:06 )  PTT:30.0 sec      RADIOLOGY & ADDITIONAL STUDIES:

## 2018-01-20 NOTE — PROGRESS NOTE ADULT - ASSESSMENT
67 yom transferred from [place] after having left sided weakness found to have ICH likely hypertensive. Pt was initially managed in ICU until stable, stroke workup complete, neurology following.  Cardio decided to cath pt yesterday as part of workup and pt was NPO, post procedure he was found to be extremely hypertensive with HTN crisis and was brought to ICU overnight last night for BP mgt.  Pt was found to have a 70% midLAD lesion that will be medically managed for now due to fact that he is currently not candidate for ASA/Plavix due to ICH> spoke with cardio NP and pt not on ASA/Plavix at this time due to ICH and they are aware of this, eventually they will d/w neurology/NS about when it will be safe to restart these meds.  Dx: HTN crisis, s/p ICH due to prior episode HTN crisis, CVA, CAD. s/p ICU downgrade. Hospital course complicated by abdominal pain, N/V suspected acute brooks

## 2018-01-20 NOTE — PROGRESS NOTE ADULT - SUBJECTIVE AND OBJECTIVE BOX
Patient seen and examined    NS change noted  s/p cholecystostomy tube  responds intermittently; falls asleep  no c/o CP SOB NV   No swelling feet    Vital Signs Last 24 Hrs  T(C): 36.8 (20 Jan 2018 16:57), Max: 37.3 (19 Jan 2018 21:11)  T(F): 98.3 (20 Jan 2018 16:57), Max: 99.1 (19 Jan 2018 21:11)  HR: 69 (20 Jan 2018 16:57) (68 - 84)  BP: 152/79 (20 Jan 2018 16:57) (118/62 - 152/79)  BP(mean): --  RR: 18 (20 Jan 2018 16:57) (18 - 18)  SpO2: 94% (20 Jan 2018 16:57) (94% - 96%)    PHYSICAL EXAM    GENERAL: NAD,   EYES:  conjunctiva and sclera clear  NECK: Supple, No JVD/Bruit  NERVOUS SYSTEM:  sleepy,   CHEST:  CTA ,No rales or rhonchi  HEART:  RRR, No murmurs  ABDOMEN: Soft, NT/ND BS+; tube noted  EXTREMITIES:  No Edema;  SKIN: No rashes    20 Jan 2018 05:59    140    |  104    |  31.0   ----------------------------<  106    4.1     |  21.0   |  2.19     Ca    8.5        20 Jan 2018 05:59  Phos  3.1       20 Jan 2018 05:59  Mg     2.2       20 Jan 2018 05:59    TPro  6.6    /  Alb  3.2    /  TBili  0.9    /  DBili  0.2    /  AST  62     /  ALT  43     /  AlkPhos  223    19 Jan 2018 07:06                          10.3   8.6   )-----------( 231      ( 20 Jan 2018 05:59 )             30.5       Hb stable  Creat sl better   Continue same treatment- watch

## 2018-01-20 NOTE — PROGRESS NOTE ADULT - PROBLEM SELECTOR PLAN 1
s/p cholecystostomy drain by IR,  thick dark bile aspirated, now appears fluid consistency.    Tube should stay in place for 4-6 weeks or removed prior with cholecystectomy.  please flush tube daily to maintain patency    Abd US: Gallbladder with Markedly distended with sludge and stones. Extensive wall thickening and pericholecystic inflammatory changes. Positive sonographic   Rebolledo sign. Cholelithiasis and Acute cholecystitis highly suspected.  No fevers   ACS & IR consult appreciated  IVF  IV Zosyn (renal dose)  f/u Blood cx, drain cx  Vitals stable

## 2018-01-20 NOTE — PROGRESS NOTE ADULT - ASSESSMENT
66 y/o M h/o hemorrhagic stroke on admission, POD 1 s/p percutaneous cholecystostomy tube placement for acute cholecystitis. Tube drained 380 cc bile last night.    Plan:  -Continue IR cholecystostomy tube; 4-6 weeks per interventional radiology  -No current surgical intervention at this time  -Surgery signing off. Reconsult PRN.

## 2018-01-21 LAB
-  AMPICILLIN: SIGNIFICANT CHANGE UP
-  ERYTHROMYCIN: SIGNIFICANT CHANGE UP
-  TETRACYCLINE: SIGNIFICANT CHANGE UP
-  VANCOMYCIN: SIGNIFICANT CHANGE UP
ANION GAP SERPL CALC-SCNC: 14 MMOL/L — SIGNIFICANT CHANGE UP (ref 5–17)
BASOPHILS # BLD AUTO: 0 K/UL — SIGNIFICANT CHANGE UP (ref 0–0.2)
BASOPHILS NFR BLD AUTO: 0.3 % — SIGNIFICANT CHANGE UP (ref 0–2)
BUN SERPL-MCNC: 25 MG/DL — HIGH (ref 8–20)
CALCIUM SERPL-MCNC: 8.9 MG/DL — SIGNIFICANT CHANGE UP (ref 8.6–10.2)
CHLORIDE SERPL-SCNC: 101 MMOL/L — SIGNIFICANT CHANGE UP (ref 98–107)
CO2 SERPL-SCNC: 21 MMOL/L — LOW (ref 22–29)
CREAT SERPL-MCNC: 2.05 MG/DL — HIGH (ref 0.5–1.3)
EOSINOPHIL # BLD AUTO: 1.1 K/UL — HIGH (ref 0–0.5)
EOSINOPHIL NFR BLD AUTO: 14.4 % — HIGH (ref 0–5)
GLUCOSE BLDC GLUCOMTR-MCNC: 101 MG/DL — HIGH (ref 70–99)
GLUCOSE BLDC GLUCOMTR-MCNC: 104 MG/DL — HIGH (ref 70–99)
GLUCOSE BLDC GLUCOMTR-MCNC: 104 MG/DL — HIGH (ref 70–99)
GLUCOSE BLDC GLUCOMTR-MCNC: 160 MG/DL — HIGH (ref 70–99)
GLUCOSE SERPL-MCNC: 122 MG/DL — HIGH (ref 70–115)
HCT VFR BLD CALC: 32.7 % — LOW (ref 42–52)
HGB BLD-MCNC: 10.9 G/DL — LOW (ref 14–18)
LYMPHOCYTES # BLD AUTO: 2 K/UL — SIGNIFICANT CHANGE UP (ref 1–4.8)
LYMPHOCYTES # BLD AUTO: 25.2 % — SIGNIFICANT CHANGE UP (ref 20–55)
MAGNESIUM SERPL-MCNC: 2.3 MG/DL — SIGNIFICANT CHANGE UP (ref 1.6–2.6)
MCHC RBC-ENTMCNC: 28.5 PG — SIGNIFICANT CHANGE UP (ref 27–31)
MCHC RBC-ENTMCNC: 33.3 G/DL — SIGNIFICANT CHANGE UP (ref 32–36)
MCV RBC AUTO: 85.6 FL — SIGNIFICANT CHANGE UP (ref 80–94)
METHOD TYPE: SIGNIFICANT CHANGE UP
MONOCYTES # BLD AUTO: 0.8 K/UL — SIGNIFICANT CHANGE UP (ref 0–0.8)
MONOCYTES NFR BLD AUTO: 9.8 % — SIGNIFICANT CHANGE UP (ref 3–10)
NEUTROPHILS # BLD AUTO: 3.9 K/UL — SIGNIFICANT CHANGE UP (ref 1.8–8)
NEUTROPHILS NFR BLD AUTO: 50 % — SIGNIFICANT CHANGE UP (ref 37–73)
PHOSPHATE SERPL-MCNC: 3 MG/DL — SIGNIFICANT CHANGE UP (ref 2.4–4.7)
PLATELET # BLD AUTO: 274 K/UL — SIGNIFICANT CHANGE UP (ref 150–400)
POTASSIUM SERPL-MCNC: 4.3 MMOL/L — SIGNIFICANT CHANGE UP (ref 3.5–5.3)
POTASSIUM SERPL-SCNC: 4.3 MMOL/L — SIGNIFICANT CHANGE UP (ref 3.5–5.3)
RBC # BLD: 3.82 M/UL — LOW (ref 4.6–6.2)
RBC # FLD: 13 % — SIGNIFICANT CHANGE UP (ref 11–15.6)
SODIUM SERPL-SCNC: 136 MMOL/L — SIGNIFICANT CHANGE UP (ref 135–145)
WBC # BLD: 7.9 K/UL — SIGNIFICANT CHANGE UP (ref 4.8–10.8)
WBC # FLD AUTO: 7.9 K/UL — SIGNIFICANT CHANGE UP (ref 4.8–10.8)

## 2018-01-21 PROCEDURE — 99233 SBSQ HOSP IP/OBS HIGH 50: CPT

## 2018-01-21 RX ORDER — ENOXAPARIN SODIUM 100 MG/ML
40 INJECTION SUBCUTANEOUS DAILY
Refills: 0 | Status: DISCONTINUED | OUTPATIENT
Start: 2018-01-21 | End: 2018-01-26

## 2018-01-21 RX ORDER — ASPIRIN/CALCIUM CARB/MAGNESIUM 324 MG
81 TABLET ORAL DAILY
Refills: 0 | Status: DISCONTINUED | OUTPATIENT
Start: 2018-01-21 | End: 2018-01-26

## 2018-01-21 RX ADMIN — Medication 25 MILLIGRAM(S): at 21:42

## 2018-01-21 RX ADMIN — Medication 25 MILLIGRAM(S): at 12:37

## 2018-01-21 RX ADMIN — PIPERACILLIN AND TAZOBACTAM 25 GRAM(S): 4; .5 INJECTION, POWDER, LYOPHILIZED, FOR SOLUTION INTRAVENOUS at 05:21

## 2018-01-21 RX ADMIN — AMLODIPINE BESYLATE 10 MILLIGRAM(S): 2.5 TABLET ORAL at 05:21

## 2018-01-21 RX ADMIN — PIPERACILLIN AND TAZOBACTAM 25 GRAM(S): 4; .5 INJECTION, POWDER, LYOPHILIZED, FOR SOLUTION INTRAVENOUS at 21:42

## 2018-01-21 RX ADMIN — Medication 25 MILLIGRAM(S): at 05:22

## 2018-01-21 RX ADMIN — OXYCODONE AND ACETAMINOPHEN 1 TABLET(S): 5; 325 TABLET ORAL at 12:20

## 2018-01-21 RX ADMIN — Medication 100 MILLIGRAM(S): at 12:36

## 2018-01-21 RX ADMIN — SENNA PLUS 2 TABLET(S): 8.6 TABLET ORAL at 21:42

## 2018-01-21 RX ADMIN — PANTOPRAZOLE SODIUM 40 MILLIGRAM(S): 20 TABLET, DELAYED RELEASE ORAL at 05:21

## 2018-01-21 RX ADMIN — ENOXAPARIN SODIUM 40 MILLIGRAM(S): 100 INJECTION SUBCUTANEOUS at 17:26

## 2018-01-21 RX ADMIN — CARVEDILOL PHOSPHATE 25 MILLIGRAM(S): 80 CAPSULE, EXTENDED RELEASE ORAL at 19:01

## 2018-01-21 RX ADMIN — OXYCODONE AND ACETAMINOPHEN 1 TABLET(S): 5; 325 TABLET ORAL at 17:26

## 2018-01-21 RX ADMIN — ATORVASTATIN CALCIUM 40 MILLIGRAM(S): 80 TABLET, FILM COATED ORAL at 21:42

## 2018-01-21 RX ADMIN — Medication 100 MILLIGRAM(S): at 21:41

## 2018-01-21 RX ADMIN — OXYCODONE AND ACETAMINOPHEN 1 TABLET(S): 5; 325 TABLET ORAL at 11:20

## 2018-01-21 RX ADMIN — CARVEDILOL PHOSPHATE 25 MILLIGRAM(S): 80 CAPSULE, EXTENDED RELEASE ORAL at 05:21

## 2018-01-21 RX ADMIN — Medication 100 MILLIGRAM(S): at 05:21

## 2018-01-21 RX ADMIN — Medication 10 MILLIGRAM(S): at 17:26

## 2018-01-21 RX ADMIN — PIPERACILLIN AND TAZOBACTAM 25 GRAM(S): 4; .5 INJECTION, POWDER, LYOPHILIZED, FOR SOLUTION INTRAVENOUS at 13:36

## 2018-01-21 RX ADMIN — OXYCODONE AND ACETAMINOPHEN 1 TABLET(S): 5; 325 TABLET ORAL at 18:30

## 2018-01-21 RX ADMIN — POLYETHYLENE GLYCOL 3350 17 GRAM(S): 17 POWDER, FOR SOLUTION ORAL at 12:36

## 2018-01-21 RX ADMIN — Medication 81 MILLIGRAM(S): at 17:26

## 2018-01-21 RX ADMIN — INSULIN GLARGINE 14 UNIT(S): 100 INJECTION, SOLUTION SUBCUTANEOUS at 21:42

## 2018-01-21 NOTE — PROGRESS NOTE ADULT - PROBLEM SELECTOR PLAN 7
Cardiomyopathy is likely a mixture of ischemic and non ischemic (HTN) cardiomyopathy  Switch labetalol to carvedilol 25 mg twice daily   Hold enalapril & aldactone for now due to worsening ALIVIA
Cardiomyopathy is likely a mixture of ischemic and non ischemic (HTN) cardiomyopathy  Switch labetalol to carvedilol 25 mg twice daily   Hold enalapril & aldactone for now due to worsening ALIVIA
Cardiomyopathy is likely a mixture of ischemic and non ischemic (HTN) cardiomyopathy  Switch labetalol to carvedilol 25 mg twice daily   Hold enelapril & aldactone for now due to worsening ALIVIA
Cardiomyopathy is likely a mixture of ischemic and non ischemic (HTN) cardiomyopathy  Switch labetalol to carvedilol 25 mg twice daily   Hold enalapril & aldactone for now due to worsening ALIVIA

## 2018-01-21 NOTE — PROGRESS NOTE ADULT - SUBJECTIVE AND OBJECTIVE BOX
Patient seen and examined    Feels better; comfortable  following few simple VC  no c/o CP SOB NV   No swelling feet    Vital Signs Last 24 Hrs  T(C): 36.7 (21 Jan 2018 15:10), Max: 36.9 (20 Jan 2018 23:30)  T(F): 98.1 (21 Jan 2018 15:10), Max: 98.4 (20 Jan 2018 23:30)  HR: 69 (21 Jan 2018 19:01) (63 - 69)  BP: 152/78 (21 Jan 2018 21:30) (144/78 - 161/84)  BP(mean): --  RR: 17 (21 Jan 2018 15:10) (17 - 18)  SpO2: 97% (21 Jan 2018 15:10) (97% - 99%)    PHYSICAL EXAM    GENERAL: NAD,   EYES:  conjunctiva and sclera clear  NECK: Supple, No JVD/Bruit  NERVOUS SYSTEM:  A/a;  CHEST:  CTA ,No rales or rhonchi  HEART:  RRR, No murmurs  ABDOMEN: Soft, NT/ND BS+  EXTREMITIES:  No Edema;  SKIN: No rashes    21 Jan 2018 06:31    136    |  101    |  25.0   ----------------------------<  122    4.3     |  21.0   |  2.05     Ca    8.9        21 Jan 2018 06:31  Phos  3.0       21 Jan 2018 06:31  Mg     2.3       21 Jan 2018 06:31                            10.9   7.9   )-----------( 274      ( 21 Jan 2018 06:31 )             32.7       Creat sl better; Lytes acceptable  hb stable  Continue same treatment

## 2018-01-21 NOTE — PROGRESS NOTE ADULT - PROBLEM SELECTOR PLAN 3
Second episode after cardiac cath yesterday  Restarted all PO meds  Tridil gtt overnight now off  BP improved Second episode after cardiac cath   Restarted all PO meds  Tridil gtt overnight now off  BP improved

## 2018-01-21 NOTE — PROGRESS NOTE ADULT - SUBJECTIVE AND OBJECTIVE BOX
CHIEF COMPLAINT/INTERVAL HISTORY:    Patient is a 67y old  Male who presents with a chief complaint of stroke (11 Jan 2018 21:41)      HPI:  66M transferred from [facility]. PMHX of HTN He presented AMS. Head Ct showed a Right Thalamic Hemorrhagic Bleed with some Effacement of the Lateral Ventricle and surrounding Edema.  He was brought to [facility] for further management and Neurosurgical evaluation. His BP in the ER was 170 and he was placed on a Cardene Infusion. (11 Jan 2018 19:20)      SUBJECTIVE & OBJECTIVE/ ROS: Pt seen and examined at bedside.  130165# used. Pt c/o back pain from prolonged periods in bed. No abd pain, N/V, HA, blurry vision.     ICU Vital Signs Last 24 Hrs  T(C): 36.8 (21 Jan 2018 08:37), Max: 36.9 (20 Jan 2018 23:30)  T(F): 98.3 (21 Jan 2018 08:37), Max: 98.4 (20 Jan 2018 23:30)  HR: 66 (21 Jan 2018 08:37) (65 - 69)  BP: 149/82 (21 Jan 2018 08:37) (144/78 - 152/80)  BP(mean): --  ABP: --  ABP(mean): --  RR: 18 (21 Jan 2018 08:37) (18 - 18)  SpO2: 99% (21 Jan 2018 08:37) (94% - 99%)        MEDICATIONS  (STANDING):  amLODIPine   Tablet 10 milliGRAM(s) Oral daily  atorvastatin 40 milliGRAM(s) Oral at bedtime  carvedilol 25 milliGRAM(s) Oral every 12 hours  dextrose 5%. 1000 milliLiter(s) (50 mL/Hr) IV Continuous <Continuous>  dextrose 50% Injectable 12.5 Gram(s) IV Push once  dextrose 50% Injectable 25 Gram(s) IV Push once  dextrose 50% Injectable 25 Gram(s) IV Push once  docusate sodium 100 milliGRAM(s) Oral three times a day  hydrALAZINE 25 milliGRAM(s) Oral three times a day  influenza   Vaccine 0.5 milliLiter(s) IntraMuscular once  insulin glargine Injectable (LANTUS) 14 Unit(s) SubCutaneous at bedtime  insulin lispro (HumaLOG) corrective regimen sliding scale   SubCutaneous three times a day before meals  insulin lispro (HumaLOG) corrective regimen sliding scale   SubCutaneous at bedtime  pantoprazole    Tablet 40 milliGRAM(s) Oral before breakfast  piperacillin/tazobactam IVPB. 3.375 Gram(s) IV Intermittent every 8 hours  polyethylene glycol 3350 17 Gram(s) Oral daily  senna 2 Tablet(s) Oral at bedtime  sodium chloride 0.9%. 1000 milliLiter(s) (75 mL/Hr) IV Continuous <Continuous>    MEDICATIONS  (PRN):  aluminum hydroxide/magnesium hydroxide/simethicone Suspension 30 milliLiter(s) Oral every 4 hours PRN Dyspepsia  dextrose Gel 1 Dose(s) Oral once PRN Blood Glucose LESS THAN 70 milliGRAM(s)/deciliter  glucagon  Injectable 1 milliGRAM(s) IntraMuscular once PRN Glucose LESS THAN 70 milligrams/deciliter  hydrALAZINE Injectable 10 milliGRAM(s) IV Push every 4 hours PRN sbp>160  morphine  - Injectable 0.5 milliGRAM(s) IV Push every 4 hours PRN Moderate Pain (4 - 6)  oxyCODONE    5 mG/acetaminophen 325 mG 1 Tablet(s) Oral every 4 hours PRN Moderate Pain (4 - 6)      LABS:                        10.9   7.9   )-----------( 274      ( 21 Jan 2018 06:31 )             32.7     01-21    136  |  101  |  25.0<H>  ----------------------------<  122<H>  4.3   |  21.0<L>  |  2.05<H>    Ca    8.9      21 Jan 2018 06:31  Phos  3.0     01-21  Mg     2.3     01-21            CAPILLARY BLOOD GLUCOSE      POCT Blood Glucose.: 104 mg/dL (21 Jan 2018 11:18)  POCT Blood Glucose.: 101 mg/dL (21 Jan 2018 08:29)  POCT Blood Glucose.: 159 mg/dL (20 Jan 2018 21:39)  POCT Blood Glucose.: 205 mg/dL (20 Jan 2018 16:56)      RECENT CULTURES:      RADIOLOGY & ADDITIONAL TESTS:      PHYSICAL EXAM:    GENERAL: NAD  HEENT: EOMI  Neck: supple  CHEST/LUNG: CTA b/l   HEART: S1S2+ audible  ABDOMEN: Soft, +Reoblledo's, diffuse abd tenderness to palp (highest in RUQ), Nondistended; Bowel sounds present  EXTREMITIES:  no edema  CNS: AAO X 3, left sided hemiplegia

## 2018-01-21 NOTE — PROGRESS NOTE ADULT - PROBLEM SELECTOR PLAN 1
s/p cholecystostomy drain by IR,  thick dark bile aspirated, now appears fluid consistency.    Tube should stay in place for 4-6 weeks or removed prior with cholecystectomy.  please flush tube daily to maintain patency    Abd US: Gallbladder with Markedly distended with sludge and stones. Extensive wall thickening and pericholecystic inflammatory changes. Positive sonographic   Rebolledo sign. Cholelithiasis and Acute cholecystitis highly suspected.  No fevers   ACS & IR consult appreciated  IVF  IV Zosyn (renal dose)  f/u Blood cx, f/u drain cx  Vitals stable

## 2018-01-21 NOTE — PROGRESS NOTE ADULT - PROBLEM SELECTOR PROBLEM 7
Cardiomyopathy, unspecified type

## 2018-01-21 NOTE — PROGRESS NOTE ADULT - ASSESSMENT
67 yom transferred from [place] after having left sided weakness found to have ICH likely hypertensive. Pt was initially managed in ICU until stable, stroke workup complete, neurology following.  Cardio decided to cath pt yesterday as part of workup and pt was NPO, post procedure he was found to be extremely hypertensive with HTN crisis and was brought to ICU overnight last night for BP mgt.  Pt was found to have a 70% midLAD lesion that will be medically managed for now due to fact that he is currently not candidate for ASA/Plavix due to ICH> spoke with cardio NP and pt not on ASA/Plavix at this time due to ICH and they are aware of this, eventually they will d/w neurology/NS about when it will be safe to restart these meds.  Dx: HTN crisis, s/p ICH due to prior episode HTN crisis, CVA, CAD. s/p ICU downgrade. Hospital course complicated by abdominal pain, N/V suspected acute brooks 67 yom transferred from [place] after having left sided weakness found to have ICH likely hypertensive. Pt was initially managed in ICU until stable, stroke workup complete, neurology following.  Cardio decided to cath pt yesterday as part of workup and pt was NPO, post procedure he was found to be extremely hypertensive with HTN crisis and was brought to ICU overnight last night for BP mgt.  Pt was found to have a 70% midLAD lesion that will be medically managed for now due to fact that he is currently not candidate for ASA/Plavix due to ICH> spoke with cardio NP and pt not on ASA/Plavix at this time due to ICH and they are aware of this, eventually they will d/w neurology/NS about when it will be safe to restart these meds.  Dx: HTN crisis, s/p ICH due to prior episode HTN crisis, CVA, CAD. s/p ICU downgrade. Hospital course complicated by abdominal pain, N/V suspected acute brooks. Now s/p cholecystostomy, on IV ab. Cr improving with IVF

## 2018-01-21 NOTE — PROGRESS NOTE ADULT - PROBLEM SELECTOR PLAN 5
mid LAD 70% lesion will need eventual stents when pt cleared for Plavix by neurosurgery, can start ASA 81 as per neurosurgery  PCI likely will be done as outpt after pt recovered  medical mgt for now. mid LAD 70% lesion will need eventual stents when pt cleared for Plavix by neurosurgery, can start ASA 81 & DVT ppx as per neurosurgery  PCI likely will be done as outpt after pt recovered  medical mgt for now.

## 2018-01-22 LAB
ANION GAP SERPL CALC-SCNC: 14 MMOL/L — SIGNIFICANT CHANGE UP (ref 5–17)
BASOPHILS # BLD AUTO: 0 K/UL — SIGNIFICANT CHANGE UP (ref 0–0.2)
BASOPHILS NFR BLD AUTO: 0.5 % — SIGNIFICANT CHANGE UP (ref 0–2)
BUN SERPL-MCNC: 21 MG/DL — HIGH (ref 8–20)
CALCIUM SERPL-MCNC: 8.8 MG/DL — SIGNIFICANT CHANGE UP (ref 8.6–10.2)
CHLORIDE SERPL-SCNC: 104 MMOL/L — SIGNIFICANT CHANGE UP (ref 98–107)
CO2 SERPL-SCNC: 22 MMOL/L — SIGNIFICANT CHANGE UP (ref 22–29)
CREAT SERPL-MCNC: 2.11 MG/DL — HIGH (ref 0.5–1.3)
EOSINOPHIL # BLD AUTO: 1.4 K/UL — HIGH (ref 0–0.5)
EOSINOPHIL NFR BLD AUTO: 16.8 % — HIGH (ref 0–6)
GLUCOSE BLDC GLUCOMTR-MCNC: 128 MG/DL — HIGH (ref 70–99)
GLUCOSE BLDC GLUCOMTR-MCNC: 194 MG/DL — HIGH (ref 70–99)
GLUCOSE BLDC GLUCOMTR-MCNC: 223 MG/DL — HIGH (ref 70–99)
GLUCOSE BLDC GLUCOMTR-MCNC: 99 MG/DL — SIGNIFICANT CHANGE UP (ref 70–99)
GLUCOSE SERPL-MCNC: 107 MG/DL — SIGNIFICANT CHANGE UP (ref 70–115)
HCT VFR BLD CALC: 32.3 % — LOW (ref 42–52)
HGB BLD-MCNC: 10.8 G/DL — LOW (ref 14–18)
LYMPHOCYTES # BLD AUTO: 2.2 K/UL — SIGNIFICANT CHANGE UP (ref 1–4.8)
LYMPHOCYTES # BLD AUTO: 26.3 % — SIGNIFICANT CHANGE UP (ref 20–55)
MAGNESIUM SERPL-MCNC: 2.2 MG/DL — SIGNIFICANT CHANGE UP (ref 1.6–2.6)
MCHC RBC-ENTMCNC: 28.4 PG — SIGNIFICANT CHANGE UP (ref 27–31)
MCHC RBC-ENTMCNC: 33.4 G/DL — SIGNIFICANT CHANGE UP (ref 32–36)
MCV RBC AUTO: 85 FL — SIGNIFICANT CHANGE UP (ref 80–94)
MONOCYTES # BLD AUTO: 1 K/UL — HIGH (ref 0–0.8)
MONOCYTES NFR BLD AUTO: 11.3 % — HIGH (ref 3–10)
NEUTROPHILS # BLD AUTO: 3.8 K/UL — SIGNIFICANT CHANGE UP (ref 1.8–8)
NEUTROPHILS NFR BLD AUTO: 44.7 % — SIGNIFICANT CHANGE UP (ref 37–73)
PHOSPHATE SERPL-MCNC: 3.6 MG/DL — SIGNIFICANT CHANGE UP (ref 2.4–4.7)
PLATELET # BLD AUTO: 301 K/UL — SIGNIFICANT CHANGE UP (ref 150–400)
POTASSIUM SERPL-MCNC: 4.1 MMOL/L — SIGNIFICANT CHANGE UP (ref 3.5–5.3)
POTASSIUM SERPL-SCNC: 4.1 MMOL/L — SIGNIFICANT CHANGE UP (ref 3.5–5.3)
RBC # BLD: 3.8 M/UL — LOW (ref 4.6–6.2)
RBC # FLD: 12.5 % — SIGNIFICANT CHANGE UP (ref 11–15.6)
SODIUM SERPL-SCNC: 140 MMOL/L — SIGNIFICANT CHANGE UP (ref 135–145)
WBC # BLD: 8.4 K/UL — SIGNIFICANT CHANGE UP (ref 4.8–10.8)
WBC # FLD AUTO: 8.4 K/UL — SIGNIFICANT CHANGE UP (ref 4.8–10.8)

## 2018-01-22 PROCEDURE — 99233 SBSQ HOSP IP/OBS HIGH 50: CPT

## 2018-01-22 RX ORDER — OXYCODONE AND ACETAMINOPHEN 5; 325 MG/1; MG/1
1 TABLET ORAL EVERY 4 HOURS
Refills: 0 | Status: DISCONTINUED | OUTPATIENT
Start: 2018-01-22 | End: 2018-01-25

## 2018-01-22 RX ORDER — ONDANSETRON 8 MG/1
4 TABLET, FILM COATED ORAL ONCE
Refills: 0 | Status: COMPLETED | OUTPATIENT
Start: 2018-01-22 | End: 2018-01-22

## 2018-01-22 RX ORDER — ONDANSETRON 8 MG/1
4 TABLET, FILM COATED ORAL EVERY 6 HOURS
Refills: 0 | Status: DISCONTINUED | OUTPATIENT
Start: 2018-01-22 | End: 2018-01-26

## 2018-01-22 RX ADMIN — MORPHINE SULFATE 0.5 MILLIGRAM(S): 50 CAPSULE, EXTENDED RELEASE ORAL at 07:10

## 2018-01-22 RX ADMIN — OXYCODONE AND ACETAMINOPHEN 1 TABLET(S): 5; 325 TABLET ORAL at 09:42

## 2018-01-22 RX ADMIN — Medication 81 MILLIGRAM(S): at 13:07

## 2018-01-22 RX ADMIN — ONDANSETRON 4 MILLIGRAM(S): 8 TABLET, FILM COATED ORAL at 10:38

## 2018-01-22 RX ADMIN — Medication 1: at 18:03

## 2018-01-22 RX ADMIN — AMLODIPINE BESYLATE 10 MILLIGRAM(S): 2.5 TABLET ORAL at 05:57

## 2018-01-22 RX ADMIN — OXYCODONE AND ACETAMINOPHEN 1 TABLET(S): 5; 325 TABLET ORAL at 23:30

## 2018-01-22 RX ADMIN — MORPHINE SULFATE 0.5 MILLIGRAM(S): 50 CAPSULE, EXTENDED RELEASE ORAL at 06:56

## 2018-01-22 RX ADMIN — Medication 25 MILLIGRAM(S): at 22:39

## 2018-01-22 RX ADMIN — PIPERACILLIN AND TAZOBACTAM 25 GRAM(S): 4; .5 INJECTION, POWDER, LYOPHILIZED, FOR SOLUTION INTRAVENOUS at 05:57

## 2018-01-22 RX ADMIN — OXYCODONE AND ACETAMINOPHEN 1 TABLET(S): 5; 325 TABLET ORAL at 22:38

## 2018-01-22 RX ADMIN — SODIUM CHLORIDE 75 MILLILITER(S): 9 INJECTION INTRAMUSCULAR; INTRAVENOUS; SUBCUTANEOUS at 22:39

## 2018-01-22 RX ADMIN — PIPERACILLIN AND TAZOBACTAM 25 GRAM(S): 4; .5 INJECTION, POWDER, LYOPHILIZED, FOR SOLUTION INTRAVENOUS at 22:39

## 2018-01-22 RX ADMIN — ENOXAPARIN SODIUM 40 MILLIGRAM(S): 100 INJECTION SUBCUTANEOUS at 13:07

## 2018-01-22 RX ADMIN — INSULIN GLARGINE 14 UNIT(S): 100 INJECTION, SOLUTION SUBCUTANEOUS at 22:38

## 2018-01-22 RX ADMIN — Medication 25 MILLIGRAM(S): at 05:57

## 2018-01-22 RX ADMIN — PANTOPRAZOLE SODIUM 40 MILLIGRAM(S): 20 TABLET, DELAYED RELEASE ORAL at 05:57

## 2018-01-22 RX ADMIN — OXYCODONE AND ACETAMINOPHEN 1 TABLET(S): 5; 325 TABLET ORAL at 08:42

## 2018-01-22 RX ADMIN — PIPERACILLIN AND TAZOBACTAM 25 GRAM(S): 4; .5 INJECTION, POWDER, LYOPHILIZED, FOR SOLUTION INTRAVENOUS at 13:14

## 2018-01-22 RX ADMIN — CARVEDILOL PHOSPHATE 25 MILLIGRAM(S): 80 CAPSULE, EXTENDED RELEASE ORAL at 05:57

## 2018-01-22 RX ADMIN — CARVEDILOL PHOSPHATE 25 MILLIGRAM(S): 80 CAPSULE, EXTENDED RELEASE ORAL at 18:02

## 2018-01-22 RX ADMIN — Medication 25 MILLIGRAM(S): at 13:07

## 2018-01-22 RX ADMIN — ATORVASTATIN CALCIUM 40 MILLIGRAM(S): 80 TABLET, FILM COATED ORAL at 22:40

## 2018-01-22 NOTE — PHYSICAL THERAPY INITIAL EVALUATION ADULT - IMPAIRMENTS FOUND, PT EVAL
aerobic capacity/endurance/gait, locomotion, and balance/muscle strength/neuromotor development and sensory integration/poor safety awareness/posture/ROM/tone
decreased midline orientation/gait, locomotion, and balance/muscle strength/posture/tone
aerobic capacity/endurance/decreased midline orientation/gait, locomotion, and balance/gross motor/muscle strength/neuromotor development and sensory integration/poor safety awareness/posture/ROM

## 2018-01-22 NOTE — PHYSICAL THERAPY INITIAL EVALUATION ADULT - PRECAUTIONS/LIMITATIONS, REHAB EVAL
cardiac precautions/fall precautions
aspiration precautions/fall precautions
aspiration precautions/cardiac precautions/fall precautions

## 2018-01-22 NOTE — PHYSICAL THERAPY INITIAL EVALUATION ADULT - BALANCE DISTURBANCE, SYSTEM IMPAIRMENT CONTRIBUTE, REHAB EVAL
somatosensory/neuromuscular/musculoskeletal
neuromuscular/musculoskeletal
somatosensory/neuromuscular/musculoskeletal

## 2018-01-22 NOTE — PHYSICAL THERAPY INITIAL EVALUATION ADULT - PLANNED THERAPY INTERVENTIONS, PT EVAL
balance training/bed mobility training/gait training/motor coordination training/neuromuscular re-education/postural re-education/ROM/strengthening/stretching/transfer training
balance training/bed mobility training/gait training/strengthening/transfer training
balance training/bed mobility training/gait training/motor coordination training/neuromuscular re-education/ROM/strengthening/stretching/transfer training/wheelchair management/propulsion training

## 2018-01-22 NOTE — PHYSICAL THERAPY INITIAL EVALUATION ADULT - ORIENTATION, REHAB EVAL
person/place/situation
 present/oriented to person, place, time and situation
oriented to person, place, time and situation

## 2018-01-22 NOTE — PROGRESS NOTE ADULT - SUBJECTIVE AND OBJECTIVE BOX
Patient seen and examined    Feels fine; more wake and following' family present  no c/o CP SOB NV   No swelling feet  Can't move L side    Vital Signs Last 24 Hrs  T(C): 37.1 (22 Jan 2018 16:00), Max: 37.1 (22 Jan 2018 16:00)  T(F): 98.7 (22 Jan 2018 16:00), Max: 98.7 (22 Jan 2018 16:00)  HR: 64 (22 Jan 2018 16:00) (56 - 69)  BP: 164/80 (22 Jan 2018 09:47) (147/63 - 164/80)  BP(mean): --  RR: 18 (22 Jan 2018 16:00) (17 - 18)  SpO2: 96% (22 Jan 2018 16:00) (96% - 98%)    PHYSICAL EXAM    GENERAL: NAD,   EYES:  conjunctiva and sclera clear  NECK: Supple, No JVD/Bruit  NERVOUS SYSTEM:  A/O ; L side 0/5; on R side 4/5  CHEST:  CTA ,No rales or rhonchi  HEART:  RRR, No murmurs  ABDOMEN: Soft, NT/ND BS+  EXTREMITIES:  No Edema;  SKIN: No rashes    22 Jan 2018 06:13    140    |  104    |  21.0   ----------------------------<  107    4.1     |  22.0   |  2.11     Ca    8.8        22 Jan 2018 06:13  Phos  3.6       22 Jan 2018 06:13  Mg     2.2       22 Jan 2018 06:13                            10.8   8.4   )-----------( 301      ( 22 Jan 2018 06:13 )             32.3       ALIVIA - creat stable around 2.1; cause unclear; may have some CKD d/t dm Htn  check 24 H urines  on Zosyn - can affect cfreat ; watch  CAD - on med therapy for now  Continue PT

## 2018-01-22 NOTE — PHYSICAL THERAPY INITIAL EVALUATION ADULT - CRITERIA FOR SKILLED THERAPEUTIC INTERVENTIONS
impairments found/functional limitations in following categories/risk reduction/prevention/rehab potential/therapy frequency/predicted duration of therapy intervention/anticipated equipment needs at discharge/anticipated discharge recommendation
impairments found/functional limitations in following categories
impairments found/functional limitations in following categories/risk reduction/prevention/rehab potential/therapy frequency/predicted duration of therapy intervention/anticipated equipment needs at discharge/anticipated discharge recommendation

## 2018-01-22 NOTE — PHYSICAL THERAPY INITIAL EVALUATION ADULT - PHYSICAL ASSIST/NONPHYSICAL ASSIST: SUPINE/SIT, REHAB EVAL
1 person assist
verbal cues/nonverbal cues (demo/gestures)/2 person assist
verbal cues/1 person assist

## 2018-01-22 NOTE — PROGRESS NOTE ADULT - PROBLEM SELECTOR PLAN 1
S/p cholecystostomy drain by IR - to stay in place for 4-6 weeks, flush tubes daily to maintain patency  C/w zosyn day 5  Awaiting blood culture and drain cultures

## 2018-01-22 NOTE — PHYSICAL THERAPY INITIAL EVALUATION ADULT - DIAGNOSIS, PT EVAL
Impaired Functional Mobility due to recent medical events.
Impaired functional mobility, due to Admitted to the hospital for nontraumatic subcortical hemorrhage.
decreased strength and balance; difficulty with functional mobility

## 2018-01-22 NOTE — PROGRESS NOTE ADULT - SUBJECTIVE AND OBJECTIVE BOX
COLTEN MOYER    [id number]    67y      Male    INTERVAL HPI/OVERNIGHT EVENTS: Via bedside . Pt reports nausea and vomiting this morning which has since resolved.     Hospital course:  67 yom transferred from [place] after having left sided weakness found to have ICH likely hypertensive. Pt was initially managed in ICU until stable, stroke workup complete, neurology following.  Cardio decided to cath pt yesterday as part of workup and pt was NPO, post procedure he was found to be extremely hypertensive with HTN crisis and was brought to ICU BP mgt.  Pt was found to have a 70% midLAD lesion that will be medically managed for now due to fact that he is currently not candidate for ASA/Plavix due to ICH> spoke with cardio NP and pt not on ASA/Plavix at this time due to ICH and they are aware of this, eventually they will d/w neurology/NS about when it will be safe to restart these meds. s/p ICU downgrade. Hospital course complicated by abdominal pain, N/V suspected acute brooks. Now s/p cholecystostomy by IR, on IV ab. Cr improving with IVF      REVIEW OF SYSTEMS:    CONSTITUTIONAL: No fever   RESPIRATORY: No cough   CARDIOVASCULAR: No chest pain       Vital Signs Last 24 Hrs  T(C): 36.5 (22 Jan 2018 08:02), Max: 36.9 (22 Jan 2018 00:57)  T(F): 97.7 (22 Jan 2018 08:02), Max: 98.4 (22 Jan 2018 00:57)  HR: 56 (22 Jan 2018 09:47) (56 - 69)  BP: 164/80 (22 Jan 2018 09:47) (147/63 - 164/80)  BP(mean): --  RR: 18 (22 Jan 2018 08:02) (17 - 18)  SpO2: 97% (22 Jan 2018 08:02) (97% - 98%)    PHYSICAL EXAM:    GENERAL: NAD   HEENT: PERRL, +EOMI, MMM  CHEST/LUNG: Clear to percussion bilaterally   HEART: S1S2+, Regular rate and rhythm   ABDOMEN: Soft, Nontender, Nondistended; Bowel sounds present, cholecystostomy tube in place       LABS:                        10.8   8.4   )-----------( 301      ( 22 Jan 2018 06:13 )             32.3     01-22    140  |  104  |  21.0<H>  ----------------------------<  107  4.1   |  22.0  |  2.11<H>    Ca    8.8      22 Jan 2018 06:13  Phos  3.6     01-22  Mg     2.2     01-22              MEDICATIONS  (STANDING):  amLODIPine   Tablet 10 milliGRAM(s) Oral daily  aspirin  chewable 81 milliGRAM(s) Oral daily  atorvastatin 40 milliGRAM(s) Oral at bedtime  carvedilol 25 milliGRAM(s) Oral every 12 hours  dextrose 5%. 1000 milliLiter(s) (50 mL/Hr) IV Continuous <Continuous>  dextrose 50% Injectable 12.5 Gram(s) IV Push once  dextrose 50% Injectable 25 Gram(s) IV Push once  dextrose 50% Injectable 25 Gram(s) IV Push once  docusate sodium 100 milliGRAM(s) Oral three times a day  enoxaparin Injectable 40 milliGRAM(s) SubCutaneous daily  hydrALAZINE 25 milliGRAM(s) Oral three times a day  influenza   Vaccine 0.5 milliLiter(s) IntraMuscular once  insulin glargine Injectable (LANTUS) 14 Unit(s) SubCutaneous at bedtime  insulin lispro (HumaLOG) corrective regimen sliding scale   SubCutaneous three times a day before meals  insulin lispro (HumaLOG) corrective regimen sliding scale   SubCutaneous at bedtime  pantoprazole    Tablet 40 milliGRAM(s) Oral before breakfast  piperacillin/tazobactam IVPB. 3.375 Gram(s) IV Intermittent every 8 hours  polyethylene glycol 3350 17 Gram(s) Oral daily  senna 2 Tablet(s) Oral at bedtime  sodium chloride 0.9%. 1000 milliLiter(s) (75 mL/Hr) IV Continuous <Continuous>    MEDICATIONS  (PRN):  aluminum hydroxide/magnesium hydroxide/simethicone Suspension 30 milliLiter(s) Oral every 4 hours PRN Dyspepsia  bisacodyl Suppository 10 milliGRAM(s) Rectal daily PRN Constipation  dextrose Gel 1 Dose(s) Oral once PRN Blood Glucose LESS THAN 70 milliGRAM(s)/deciliter  glucagon  Injectable 1 milliGRAM(s) IntraMuscular once PRN Glucose LESS THAN 70 milligrams/deciliter  hydrALAZINE Injectable 10 milliGRAM(s) IV Push every 4 hours PRN sbp>160  morphine  - Injectable 0.5 milliGRAM(s) IV Push every 4 hours PRN Moderate Pain (4 - 6)  oxyCODONE    5 mG/acetaminophen 325 mG 1 Tablet(s) Oral every 4 hours PRN Moderate Pain (4 - 6)      RADIOLOGY & ADDITIONAL TESTS:

## 2018-01-22 NOTE — PHYSICAL THERAPY INITIAL EVALUATION ADULT - MUSCLE TONE ASSESSMENT, REHAB EVAL
left LE - increased tone/Left UE/flaccid
Left UE/Left LE/hypertonic
left LE/Left UE/Left LE/severely increased tone

## 2018-01-22 NOTE — PHYSICAL THERAPY INITIAL EVALUATION ADULT - BALANCE DISTURBANCE, IDENTIFIED IMPAIRMENT CONTRIBUTE, REHAB EVAL
impaired coordination/impaired motor control/abnormal muscle tone/impaired postural control/decreased ROM/decreased sensation/decreased strength
impaired coordination/impaired motor control/abnormal muscle tone/impaired postural control/decreased ROM/decreased sensation/impaired sensory feedback/decreased strength
impaired coordination/impaired motor control/abnormal muscle tone/impaired postural control/decreased strength

## 2018-01-22 NOTE — PHYSICAL THERAPY INITIAL EVALUATION ADULT - BED MOBILITY LIMITATIONS, REHAB EVAL
decreased ability to use arms for pushing/pulling/decreased ability to use legs for bridging/pushing/impaired ability to control trunk for mobility
decreased ability to use arms for pushing/pulling/decreased ability to use legs for bridging/pushing
decreased ability to use arms for pushing/pulling/decreased ability to use legs for bridging/pushing

## 2018-01-22 NOTE — PHYSICAL THERAPY INITIAL EVALUATION ADULT - DISCHARGE DISPOSITION, PT EVAL
acute rehab/rehabilitation facility
Acute rehab/rehabilitation facility
Acute care setting/rehabilitation facility

## 2018-01-22 NOTE — PHYSICAL THERAPY INITIAL EVALUATION ADULT - LEVEL OF INDEPENDENCE: SIT/SUPINE, REHAB EVAL
maximum assist (25% patients effort)
maximum assist (25% patients effort)
moderate assist (50% patients effort)

## 2018-01-22 NOTE — PHYSICAL THERAPY INITIAL EVALUATION ADULT - LEVEL OF INDEPENDENCE: SCOOT/BRIDGE, REHAB EVAL
maximum assist (25% patients effort)

## 2018-01-22 NOTE — PHYSICAL THERAPY INITIAL EVALUATION ADULT - FUNCTIONAL LIMITATIONS, PT EVAL
self-care/home management/work/community/leisure
self-care/home management/work
self-care/home management/work/community/leisure

## 2018-01-22 NOTE — PHYSICAL THERAPY INITIAL EVALUATION ADULT - PHYSICAL ASSIST/NONPHYSICAL ASSIST: SIT/STAND, REHAB EVAL
verbal cues/nonverbal cues (demo/gestures)/2 person assist
2 person assist
verbal cues/nonverbal cues (demo/gestures)/2 person assist

## 2018-01-22 NOTE — PHYSICAL THERAPY INITIAL EVALUATION ADULT - ADDITIONAL COMMENTS
Pt. lives in the second floor apartment, 8 steps to get in and to the apartment 11 with the rail on the right , PTA working at the [employer].
Pt. lives in the second floor apartment, 8 steps to get in and to the apartment 11 with the rail on the right , PTA working at the [employer].

## 2018-01-22 NOTE — PHYSICAL THERAPY INITIAL EVALUATION ADULT - LEVEL OF INDEPENDENCE: SIT/STAND, REHAB EVAL
maximum assist (25% patients effort)
moderate assist (50% patients effort)
moderate assist (50% patients effort)/maximum assist (25% patients effort)

## 2018-01-22 NOTE — PHYSICAL THERAPY INITIAL EVALUATION ADULT - ACTIVE RANGE OF MOTION EXAMINATION, REHAB EVAL
Right UE Active ROM was WFL (within functional limits); Right LE Active ROM was WFL (within functional limits); left UE trace movement noted through out, left LE trace at the hip and knee, 0/5 at the ankle/Right UE Active ROM was WFL (within functional limits)/Right LE Active ROM was WFL (within functional limits)
no AROM in the left UE and LE/Right UE Active ROM was WNL (within normal limits)/RLE Active ROM was WNL (within normal limits)/deficits as listed below
left UE trace movement noted through out, left LE trace at the hip and knee, 0/5 at the ankle/Right UE Active ROM was WFL (within functional limits)/Right LE Active ROM was WFL (within functional limits)

## 2018-01-22 NOTE — PHYSICAL THERAPY INITIAL EVALUATION ADULT - GENERAL OBSERVATIONS, REHAB EVAL
Pt. received in semi foster position, NAD.
Pt. received in semi foster position, NAD.
pt. received supine in bed, +IV, +telemetry

## 2018-01-22 NOTE — PROGRESS NOTE ADULT - SUBJECTIVE AND OBJECTIVE BOX
Patient in chair. Feels comfortable.   Patient finally agreed to have procedure of percutaneous Cholecystostomy on 1/19 by Dr. Jackson. Plan is to keep drain in place for 4-6 weeks or removed prior to cholecystectomy.   Pain in the abdomen is improved.   Slept well.  Worked with PT a little bit. Continues to have ongoing weakness of the left side.     FUNCTIONAL PROGRESS  1/19  Bed Mobility  Bed Mobility Training Rolling/Turning: moderate assist (50% patient effort);  1 person assist;  verbal cues;  bed rails  Bed Mobility Training Sit-to-Supine: maximum assist (25% patient effort);  1 person assist;  verbal cues;  bed rails  Bed Mobility Training Supine-to-Sit: maximum assist (25% patient effort);  1 person assist;  verbal cues;  bed rails  Bed Mobility Training Limitations: decreased strength;  cognitive, decreased safety awareness;  decreased ability to use arms for pushing/pulling;  decreased ability to use legs for bridging/pushing;  impaired ability to control trunk for mobility    Bed-Chair Transfer Training  Transfer Training Bed-to-Chair Transfer: maximum assist (25% patient effort);  1 person assist;  verbal cues;  full weight-bearing   No AD;  Squat pivot  Bed-to-Chair Transfer Training Transfer Safety Analysis: decreased weight-shifting ability;  decreased sequencing ability;  decreased strength;  cognitive, decreased safety awareness;  unable to maintain weight bearing status    Sit-Stand Transfer Training  Transfer Training Sit-to-Stand Transfer: maximum assist (25% patient effort);  1 person assist;  verbal cues;  weight-bearing as tolerated   rolling walker  Transfer Training Stand-to-Sit Transfer: maximum assist (25% patient effort);  1 person assist;  verbal cues;  weight-bearing as tolerated   rolling walker  Sit-to-Stand Transfer Training Transfer Safety Analysis: decreased weight-shifting ability;  decreased sequencing ability;  decreased strength;  unable to maintain weight bearing status;  cognitive, decreased safety awareness;  rolling walker    Gait Training  Gait Training: maximum assist (25% patient effort);  1 person assist;  verbal cues;  weight-bearing as tolerated   rolling walker;  1 Step      REVIEW OF SYSTEMS  Constitutional - No fever,  +fatigue  Neurological - No headaches, No memory loss, +loss of strength, No numbness, No tremors  Skin - No rashes, No lesions   Musculoskeletal - No joint pain, No joint swelling, +muscle pain  Psychiatric - +depression, No anxiety    VITALS  T(C): 36.5 (01-22-18 @ 08:02), Max: 36.9 (01-22-18 @ 00:57)  HR: 56 (01-22-18 @ 09:47) (56 - 69)  BP: 164/80 (01-22-18 @ 09:47) (147/63 - 164/80)  RR: 18 (01-22-18 @ 08:02) (17 - 18)  SpO2: 97% (01-22-18 @ 08:02) (97% - 98%)  Wt(kg): --    MEDICATIONS   aluminum hydroxide/magnesium hydroxide/simethicone Suspension 30 milliLiter(s) every 4 hours PRN  amLODIPine   Tablet 10 milliGRAM(s) daily  aspirin  chewable 81 milliGRAM(s) daily  atorvastatin 40 milliGRAM(s) at bedtime  bisacodyl Suppository 10 milliGRAM(s) daily PRN  carvedilol 25 milliGRAM(s) every 12 hours  dextrose 5%. 1000 milliLiter(s) <Continuous>  dextrose 50% Injectable 12.5 Gram(s) once  dextrose 50% Injectable 25 Gram(s) once  dextrose 50% Injectable 25 Gram(s) once  dextrose Gel 1 Dose(s) once PRN  docusate sodium 100 milliGRAM(s) three times a day  enoxaparin Injectable 40 milliGRAM(s) daily  glucagon  Injectable 1 milliGRAM(s) once PRN  hydrALAZINE 25 milliGRAM(s) three times a day  hydrALAZINE Injectable 10 milliGRAM(s) every 4 hours PRN  influenza   Vaccine 0.5 milliLiter(s) once  insulin glargine Injectable (LANTUS) 14 Unit(s) at bedtime  insulin lispro (HumaLOG) corrective regimen sliding scale   three times a day before meals  insulin lispro (HumaLOG) corrective regimen sliding scale   at bedtime  morphine  - Injectable 0.5 milliGRAM(s) every 4 hours PRN  oxyCODONE    5 mG/acetaminophen 325 mG 1 Tablet(s) every 4 hours PRN  pantoprazole    Tablet 40 milliGRAM(s) before breakfast  piperacillin/tazobactam IVPB. 3.375 Gram(s) every 8 hours  polyethylene glycol 3350 17 Gram(s) daily  senna 2 Tablet(s) at bedtime  sodium chloride 0.9%. 1000 milliLiter(s) <Continuous>      RECENT LABS/IMAGING  CBC Full  -  ( 22 Jan 2018 06:13 )  WBC Count : 8.4 K/uL  Hemoglobin : 10.8 g/dL  Hematocrit : 32.3 %  Platelet Count - Automated : 301 K/uL  Mean Cell Volume : 85.0 fl  Mean Cell Hemoglobin : 28.4 pg  Mean Cell Hemoglobin Concentration : 33.4 g/dL  Auto Neutrophil # : 3.8 K/uL  Auto Lymphocyte # : 2.2 K/uL  Auto Monocyte # : 1.0 K/uL  Auto Eosinophil # : 1.4 K/uL  Auto Basophil # : 0.0 K/uL  Auto Neutrophil % : 44.7 %  Auto Lymphocyte % : 26.3 %  Auto Monocyte % : 11.3 %  Auto Eosinophil % : 16.8 %  Auto Basophil % : 0.5 %    01-22    140  |  104  |  21.0<H>  ----------------------------<  107  4.1   |  22.0  |  2.11<H>    Ca    8.8      22 Jan 2018 06:13  Phos  3.6     01-22  Mg     2.2     01-22        -----------------------------------------  PHYSICAL EXAM  Constitutional - NAD, Comfortable  Neurologic Exam -                    Cognitive - Awake, Alert, AAO to self, situation     Communication - +dysarthria     Cranial Nerves - loss left nasolabial fold     Motor                     LEFT    UE - ShAB 1/5, EF 1/5, EE 1/5, WE 1/5,  1/5                    LEFT    LE - HF 1/5, KE 1/5, DF 0/5, PF 0/5     Tone - 3/4 on EF and KE	     Sensory - decreased sensation left UE and LE      Psychiatric - Fatigued, depressed   ----------------------------------------------------------------------------------------  ASSESSMENT/PLAN  67M with right thalamic and corona radiata ICH with intraventricular extension likely hypertensive with left hemiparesis, dysphagia, functional, gait and ADL impairments   Acute cholecystitis - s/p IR drain, Zosyn  Pain - Morphine, Percocet  Dysphagia - MS/Nectar, currently NPO  DVT PPX - SCDs, Lovenox  Rehab -  Medically/surgically being optimized. Recommend ACUTE inpatient rehabilitation for the functional deficits consisting of 3 hours of therapy/day & 24 hour RN/daily PMR physician for comorbid medical management.     Continue bedside therapy as well as OOB throughout the day with mobilization throughout the day with staff to maintain cardiopulmonary function and prevention of secondary complications related to debility.

## 2018-01-22 NOTE — PHYSICAL THERAPY INITIAL EVALUATION ADULT - IMPAIRMENTS CONTRIBUTING IMPAIRED BED MOBILITY, REHAB EVAL
impaired balance/impaired coordination/impaired motor control/abnormal muscle tone/impaired postural control/decreased strength
impaired balance/impaired coordination/decreased flexibility/impaired motor control/abnormal muscle tone/impaired postural control/decreased ROM/decreased sensation/impaired sensory feedback/decreased strength
impaired balance/impaired coordination/decreased flexibility/impaired motor control/abnormal muscle tone/impaired postural control/decreased ROM/decreased strength

## 2018-01-22 NOTE — PHYSICAL THERAPY INITIAL EVALUATION ADULT - IMPAIRED TRANSFERS: SIT/STAND, REHAB EVAL
impaired coordination/decreased flexibility/impaired motor control/abnormal muscle tone/decreased ROM/decreased sensation/impaired sensory feedback/decreased strength
impaired balance/impaired coordination/decreased flexibility/impaired motor control/abnormal muscle tone/impaired postural control/decreased ROM/impaired sensory feedback/decreased strength
impaired balance/impaired coordination/impaired motor control/abnormal muscle tone/impaired postural control/decreased strength

## 2018-01-23 LAB
ALBUMIN SERPL ELPH-MCNC: 3.3 G/DL — SIGNIFICANT CHANGE UP (ref 3.3–5.2)
ALP SERPL-CCNC: 134 U/L — HIGH (ref 40–120)
ALT FLD-CCNC: 16 U/L — SIGNIFICANT CHANGE UP
ANION GAP SERPL CALC-SCNC: 13 MMOL/L — SIGNIFICANT CHANGE UP (ref 5–17)
AST SERPL-CCNC: 16 U/L — SIGNIFICANT CHANGE UP
BILIRUB SERPL-MCNC: 0.4 MG/DL — SIGNIFICANT CHANGE UP (ref 0.4–2)
BUN SERPL-MCNC: 21 MG/DL — HIGH (ref 8–20)
CALCIUM SERPL-MCNC: 8.9 MG/DL — SIGNIFICANT CHANGE UP (ref 8.6–10.2)
CHLORIDE SERPL-SCNC: 104 MMOL/L — SIGNIFICANT CHANGE UP (ref 98–107)
CO2 SERPL-SCNC: 22 MMOL/L — SIGNIFICANT CHANGE UP (ref 22–29)
CREAT SERPL-MCNC: 1.97 MG/DL — HIGH (ref 0.5–1.3)
GLUCOSE BLDC GLUCOMTR-MCNC: 106 MG/DL — HIGH (ref 70–99)
GLUCOSE BLDC GLUCOMTR-MCNC: 110 MG/DL — HIGH (ref 70–99)
GLUCOSE BLDC GLUCOMTR-MCNC: 150 MG/DL — HIGH (ref 70–99)
GLUCOSE BLDC GLUCOMTR-MCNC: 183 MG/DL — HIGH (ref 70–99)
GLUCOSE SERPL-MCNC: 97 MG/DL — SIGNIFICANT CHANGE UP (ref 70–115)
HCT VFR BLD CALC: 34.4 % — LOW (ref 42–52)
HGB BLD-MCNC: 11.3 G/DL — LOW (ref 14–18)
MCHC RBC-ENTMCNC: 28.4 PG — SIGNIFICANT CHANGE UP (ref 27–31)
MCHC RBC-ENTMCNC: 32.8 G/DL — SIGNIFICANT CHANGE UP (ref 32–36)
MCV RBC AUTO: 86.4 FL — SIGNIFICANT CHANGE UP (ref 80–94)
PLATELET # BLD AUTO: 344 K/UL — SIGNIFICANT CHANGE UP (ref 150–400)
POTASSIUM SERPL-MCNC: 4 MMOL/L — SIGNIFICANT CHANGE UP (ref 3.5–5.3)
POTASSIUM SERPL-SCNC: 4 MMOL/L — SIGNIFICANT CHANGE UP (ref 3.5–5.3)
PROT SERPL-MCNC: 6.7 G/DL — SIGNIFICANT CHANGE UP (ref 6.6–8.7)
RBC # BLD: 3.98 M/UL — LOW (ref 4.6–6.2)
RBC # FLD: 12.9 % — SIGNIFICANT CHANGE UP (ref 11–15.6)
SODIUM SERPL-SCNC: 139 MMOL/L — SIGNIFICANT CHANGE UP (ref 135–145)
WBC # BLD: 7.9 K/UL — SIGNIFICANT CHANGE UP (ref 4.8–10.8)
WBC # FLD AUTO: 7.9 K/UL — SIGNIFICANT CHANGE UP (ref 4.8–10.8)

## 2018-01-23 PROCEDURE — 99233 SBSQ HOSP IP/OBS HIGH 50: CPT

## 2018-01-23 RX ADMIN — Medication 100 MILLIGRAM(S): at 22:12

## 2018-01-23 RX ADMIN — Medication 10 MILLIGRAM(S): at 23:49

## 2018-01-23 RX ADMIN — Medication 25 MILLIGRAM(S): at 14:22

## 2018-01-23 RX ADMIN — ATORVASTATIN CALCIUM 40 MILLIGRAM(S): 80 TABLET, FILM COATED ORAL at 22:13

## 2018-01-23 RX ADMIN — Medication 81 MILLIGRAM(S): at 11:37

## 2018-01-23 RX ADMIN — SENNA PLUS 2 TABLET(S): 8.6 TABLET ORAL at 22:12

## 2018-01-23 RX ADMIN — Medication 0: at 22:12

## 2018-01-23 RX ADMIN — PIPERACILLIN AND TAZOBACTAM 25 GRAM(S): 4; .5 INJECTION, POWDER, LYOPHILIZED, FOR SOLUTION INTRAVENOUS at 06:53

## 2018-01-23 RX ADMIN — CARVEDILOL PHOSPHATE 25 MILLIGRAM(S): 80 CAPSULE, EXTENDED RELEASE ORAL at 06:53

## 2018-01-23 RX ADMIN — INSULIN GLARGINE 14 UNIT(S): 100 INJECTION, SOLUTION SUBCUTANEOUS at 22:12

## 2018-01-23 RX ADMIN — ENOXAPARIN SODIUM 40 MILLIGRAM(S): 100 INJECTION SUBCUTANEOUS at 11:37

## 2018-01-23 RX ADMIN — CARVEDILOL PHOSPHATE 25 MILLIGRAM(S): 80 CAPSULE, EXTENDED RELEASE ORAL at 17:15

## 2018-01-23 RX ADMIN — OXYCODONE AND ACETAMINOPHEN 1 TABLET(S): 5; 325 TABLET ORAL at 12:30

## 2018-01-23 RX ADMIN — PIPERACILLIN AND TAZOBACTAM 25 GRAM(S): 4; .5 INJECTION, POWDER, LYOPHILIZED, FOR SOLUTION INTRAVENOUS at 22:12

## 2018-01-23 RX ADMIN — PIPERACILLIN AND TAZOBACTAM 25 GRAM(S): 4; .5 INJECTION, POWDER, LYOPHILIZED, FOR SOLUTION INTRAVENOUS at 14:22

## 2018-01-23 RX ADMIN — PANTOPRAZOLE SODIUM 40 MILLIGRAM(S): 20 TABLET, DELAYED RELEASE ORAL at 06:53

## 2018-01-23 RX ADMIN — Medication 25 MILLIGRAM(S): at 06:53

## 2018-01-23 RX ADMIN — AMLODIPINE BESYLATE 10 MILLIGRAM(S): 2.5 TABLET ORAL at 06:53

## 2018-01-23 RX ADMIN — Medication 25 MILLIGRAM(S): at 22:13

## 2018-01-23 RX ADMIN — OXYCODONE AND ACETAMINOPHEN 1 TABLET(S): 5; 325 TABLET ORAL at 11:34

## 2018-01-23 NOTE — PROGRESS NOTE ADULT - SUBJECTIVE AND OBJECTIVE BOX
CC: stroke     HPI:  67 yom transferred from [place] after having left sided weakness found to have ICH likely hypertensive. Pt was initially managed in ICU until stable, stroke workup complete, neurology following.  Cardio decided to cath pt yesterday as part of workup and pt was NPO, post procedure he was found to be extremely hypertensive with HTN crisis and was brought to ICU BP mgt.  Pt was found to have a 70% midLAD lesion that will be medically managed for now due to fact that he is currently not candidate for ASA/Plavix due to ICH> spoke with cardio NP and pt not on ASA/Plavix at this time due to ICH and they are aware of this, eventually they will d/w neurology/NS about when it will be safe to restart these meds. s/p ICU downgrade. Hospital course complicated by abdominal pain, N/V suspected acute brooks. Now s/p cholecystostomy by IR, on IV ab. Cr improving with IVF      INTERVAL HPI/OVERNIGHT EVENTS: Patient seen and examined lying in bed with  at bedside.  Patient complaining of Right thigh pain since yesterday and intermittent abdominal pain at brooks insertion site.  Patient denies any headache, dizziness, SOB, CP, nausea, vomiting, dysuria.  Other ROS reviewed and are negative.    Vital Signs Last 24 Hrs  T(C): 37.2 (23 Jan 2018 07:50), Max: 37.6 (22 Jan 2018 23:13)  T(F): 99 (23 Jan 2018 07:50), Max: 99.6 (22 Jan 2018 23:13)  HR: 64 (23 Jan 2018 07:50) (60 - 64)  BP: 168/81 (23 Jan 2018 07:50) (150/74 - 175/87)  BP(mean): --  RR: 18 (23 Jan 2018 07:50) (18 - 20)  SpO2: 91% (23 Jan 2018 07:50) (91% - 97%)  I&O's Detail    22 Jan 2018 07:01  -  23 Jan 2018 07:00  --------------------------------------------------------  IN:    Oral Fluid: 610 mL    sodium chloride 0.9%.: 975 mL    Solution: 150 mL  Total IN: 1735 mL    OUT:    Drain: 800 mL    Voided: 735 mL  Total OUT: 1535 mL    Total NET: 200 mL        PHYSICAL EXAM:  GENERAL: NAD  HEAD:  Atraumatic, Normocephalic  NECK: Supple, No JVD, Normal thyroid  NERVOUS SYSTEM:  Alert & Oriented X3, Good concentration; Left sided weakness  CHEST/LUNG: Clear to auscultation bilaterally; No rales, rhonchi, wheezing, or rubs  HEART: Regular rate and rhythm; No murmurs, rubs, or gallops  ABDOMEN: Soft, Nontender, Nondistended; Bowel sounds present; (+) brooks tube with bilious drainage  EXTREMITIES:  2+ Peripheral Pulses, No clubbing, cyanosis, or edema                                11.3   7.9   )-----------( 344      ( 23 Jan 2018 06:36 )             34.4     23 Jan 2018 06:36    139    |  104    |  21.0   ----------------------------<  97     4.0     |  22.0   |  1.97     Ca    8.9        23 Jan 2018 06:36  Phos  3.6       22 Jan 2018 06:13  Mg     2.2       22 Jan 2018 06:13    TPro  6.7    /  Alb  3.3    /  TBili  0.4    /  DBili  x      /  AST  16     /  ALT  16     /  AlkPhos  134    23 Jan 2018 06:36      CAPILLARY BLOOD GLUCOSE      POCT Blood Glucose.: 110 mg/dL (23 Jan 2018 07:39)  POCT Blood Glucose.: 223 mg/dL (22 Jan 2018 22:18)  POCT Blood Glucose.: 194 mg/dL (22 Jan 2018 18:02)  POCT Blood Glucose.: 128 mg/dL (22 Jan 2018 12:50)    LIVER FUNCTIONS - ( 23 Jan 2018 06:36 )  Alb: 3.3 g/dL / Pro: 6.7 g/dL / ALK PHOS: 134 U/L / ALT: 16 U/L / AST: 16 U/L / GGT: x               MEDICATIONS  (STANDING):  amLODIPine   Tablet 10 milliGRAM(s) Oral daily  aspirin  chewable 81 milliGRAM(s) Oral daily  atorvastatin 40 milliGRAM(s) Oral at bedtime  carvedilol 25 milliGRAM(s) Oral every 12 hours  dextrose 5%. 1000 milliLiter(s) (50 mL/Hr) IV Continuous <Continuous>  dextrose 50% Injectable 12.5 Gram(s) IV Push once  dextrose 50% Injectable 25 Gram(s) IV Push once  dextrose 50% Injectable 25 Gram(s) IV Push once  docusate sodium 100 milliGRAM(s) Oral three times a day  enoxaparin Injectable 40 milliGRAM(s) SubCutaneous daily  hydrALAZINE 25 milliGRAM(s) Oral three times a day  influenza   Vaccine 0.5 milliLiter(s) IntraMuscular once  insulin glargine Injectable (LANTUS) 14 Unit(s) SubCutaneous at bedtime  insulin lispro (HumaLOG) corrective regimen sliding scale   SubCutaneous three times a day before meals  insulin lispro (HumaLOG) corrective regimen sliding scale   SubCutaneous at bedtime  pantoprazole    Tablet 40 milliGRAM(s) Oral before breakfast  piperacillin/tazobactam IVPB. 3.375 Gram(s) IV Intermittent every 8 hours  polyethylene glycol 3350 17 Gram(s) Oral daily  senna 2 Tablet(s) Oral at bedtime  sodium chloride 0.9%. 1000 milliLiter(s) (75 mL/Hr) IV Continuous <Continuous>    MEDICATIONS  (PRN):  aluminum hydroxide/magnesium hydroxide/simethicone Suspension 30 milliLiter(s) Oral every 4 hours PRN Dyspepsia  bisacodyl Suppository 10 milliGRAM(s) Rectal daily PRN Constipation  dextrose Gel 1 Dose(s) Oral once PRN Blood Glucose LESS THAN 70 milliGRAM(s)/deciliter  glucagon  Injectable 1 milliGRAM(s) IntraMuscular once PRN Glucose LESS THAN 70 milligrams/deciliter  hydrALAZINE Injectable 10 milliGRAM(s) IV Push every 4 hours PRN sbp>160  morphine  - Injectable 0.5 milliGRAM(s) IV Push every 4 hours PRN Moderate Pain (4 - 6)  ondansetron Injectable 4 milliGRAM(s) IV Push every 6 hours PRN Nausea and/or Vomiting  oxyCODONE    5 mG/acetaminophen 325 mG 1 Tablet(s) Oral every 4 hours PRN Moderate Pain (4 - 6)

## 2018-01-23 NOTE — ADVANCED PRACTICE NURSE CONSULT - RECOMMEDATIONS
continue diabetes self management education  pt is going to acute rehabe, consider sending on the same management

## 2018-01-23 NOTE — PROGRESS NOTE ADULT - SUBJECTIVE AND OBJECTIVE BOX
NEPHROLOGY INTERVAL HPI/OVERNIGHT EVENTS: No new events.    MEDICATIONS  (STANDING):  amLODIPine   Tablet 10 milliGRAM(s) Oral daily  aspirin  chewable 81 milliGRAM(s) Oral daily  atorvastatin 40 milliGRAM(s) Oral at bedtime  carvedilol 25 milliGRAM(s) Oral every 12 hours  dextrose 5%. 1000 milliLiter(s) (50 mL/Hr) IV Continuous <Continuous>  dextrose 50% Injectable 12.5 Gram(s) IV Push once  dextrose 50% Injectable 25 Gram(s) IV Push once  dextrose 50% Injectable 25 Gram(s) IV Push once  docusate sodium 100 milliGRAM(s) Oral three times a day  enoxaparin Injectable 40 milliGRAM(s) SubCutaneous daily  hydrALAZINE 25 milliGRAM(s) Oral three times a day  influenza   Vaccine 0.5 milliLiter(s) IntraMuscular once  insulin glargine Injectable (LANTUS) 14 Unit(s) SubCutaneous at bedtime  insulin lispro (HumaLOG) corrective regimen sliding scale   SubCutaneous three times a day before meals  insulin lispro (HumaLOG) corrective regimen sliding scale   SubCutaneous at bedtime  pantoprazole    Tablet 40 milliGRAM(s) Oral before breakfast  piperacillin/tazobactam IVPB. 3.375 Gram(s) IV Intermittent every 8 hours  polyethylene glycol 3350 17 Gram(s) Oral daily  senna 2 Tablet(s) Oral at bedtime  sodium chloride 0.9%. 1000 milliLiter(s) (75 mL/Hr) IV Continuous <Continuous>    MEDICATIONS  (PRN):  aluminum hydroxide/magnesium hydroxide/simethicone Suspension 30 milliLiter(s) Oral every 4 hours PRN Dyspepsia  bisacodyl Suppository 10 milliGRAM(s) Rectal daily PRN Constipation  dextrose Gel 1 Dose(s) Oral once PRN Blood Glucose LESS THAN 70 milliGRAM(s)/deciliter  glucagon  Injectable 1 milliGRAM(s) IntraMuscular once PRN Glucose LESS THAN 70 milligrams/deciliter  hydrALAZINE Injectable 10 milliGRAM(s) IV Push every 4 hours PRN sbp>160  morphine  - Injectable 0.5 milliGRAM(s) IV Push every 4 hours PRN Moderate Pain (4 - 6)  ondansetron Injectable 4 milliGRAM(s) IV Push every 6 hours PRN Nausea and/or Vomiting  oxyCODONE    5 mG/acetaminophen 325 mG 1 Tablet(s) Oral every 4 hours PRN Moderate Pain (4 - 6)      Allergies    No Known Allergies    Intolerances        Vital Signs Last 24 Hrs  T(C): 37.2 (23 Jan 2018 07:50), Max: 37.6 (22 Jan 2018 23:13)  T(F): 99 (23 Jan 2018 07:50), Max: 99.6 (22 Jan 2018 23:13)  HR: 64 (23 Jan 2018 07:50) (56 - 64)  BP: 168/81 (23 Jan 2018 07:50) (150/74 - 175/87)  BP(mean): --  RR: 18 (23 Jan 2018 07:50) (18 - 20)  SpO2: 91% (23 Jan 2018 07:50) (91% - 97%)  Daily     Daily     PHYSICAL EXAM:    GENERAL: Comfortable  in bed  HEAD:  same  EYES:  wnl  ENMT:   NECK: veins not  full  NERVOUS SYSTEM:  awake, verbal, moves all extremties  CHEST/LUNG: clear  HEART: no rub or gallop  noted  ABDOMEN: soft, gall bladder drain noted  EXTREMITIES:  no edema  LYMPH:   SKIN: no rash    LABS:                        10.8   8.4   )-----------( 301      ( 22 Jan 2018 06:13 )             32.3     01-23    139  |  104  |  21.0<H>  ----------------------------<  97  4.0   |  22.0  |  1.97<H>    Ca    8.9      23 Jan 2018 06:36  Phos  3.6     01-22  Mg     2.2     01-22    TPro  6.7  /  Alb  3.3  /  TBili  0.4  /  DBili  x   /  AST  16  /  ALT  16  /  AlkPhos  134<H>  01-23                RADIOLOGY & ADDITIONAL TESTS:

## 2018-01-23 NOTE — PROGRESS NOTE ADULT - SUBJECTIVE AND OBJECTIVE BOX
Patient sitting in chair.   Attempting to feed himself.  Needed set up with tray and food to maximize independence.   Having pain in his back where the tube is located.   Denies abdominal pain and is eating well.  Reports that his left arm is 'very bad' and cannot move it. He feels its getting worse and would like to receive more therapy.  Rehab is currently being delayed by his surgical complications.   GFR is slowly uptrending.  Body fluid culture shows few WBC with G+ organisms.    FUNCTIONAL PROGRESS  1/23  Bed Mobility  Bed Mobility Training Scooting: moderate assist (50% patient effort);  verbal cues;  1 person assist;  nonverbal cues (demo/gestures);  bed rails    Bed-Chair Transfer Training  Transfer Training Bed-to-Chair Transfer: moderate assist (50% patient effort);  minimum assist (75% patient effort);  2 person assist;  full weight-bearing   hand     Sit-Stand Transfer Training  Transfer Training Sit-to-Stand Transfer: maximum assist (25% patient effort);  1 person assist;  nonverbal cues (demo/gestures);  verbal cues;  full weight-bearing    REVIEW OF SYSTEMS  Constitutional - No fever,  No fatigue  HEENT - No vertigo, No neck pain  Neurological - No headaches, No memory loss, +loss of strength, +numbness, No tremors  Skin - No rashes, No lesions   Musculoskeletal - No joint pain, No joint swelling, No muscle pain  Psychiatric - +depression, No anxiety    VITALS  T(C): 37.2 (01-23-18 @ 07:50), Max: 37.6 (01-22-18 @ 23:13)  HR: 64 (01-23-18 @ 07:50) (60 - 64)  BP: 168/81 (01-23-18 @ 07:50) (150/74 - 175/87)  RR: 18 (01-23-18 @ 07:50) (18 - 20)  SpO2: 91% (01-23-18 @ 07:50) (91% - 97%)  Wt(kg): --    MEDICATIONS   aluminum hydroxide/magnesium hydroxide/simethicone Suspension 30 milliLiter(s) every 4 hours PRN  amLODIPine   Tablet 10 milliGRAM(s) daily  aspirin  chewable 81 milliGRAM(s) daily  atorvastatin 40 milliGRAM(s) at bedtime  bisacodyl Suppository 10 milliGRAM(s) daily PRN  carvedilol 25 milliGRAM(s) every 12 hours  dextrose 5%. 1000 milliLiter(s) <Continuous>  dextrose 50% Injectable 12.5 Gram(s) once  dextrose 50% Injectable 25 Gram(s) once  dextrose 50% Injectable 25 Gram(s) once  dextrose Gel 1 Dose(s) once PRN  docusate sodium 100 milliGRAM(s) three times a day  enoxaparin Injectable 40 milliGRAM(s) daily  glucagon  Injectable 1 milliGRAM(s) once PRN  hydrALAZINE 25 milliGRAM(s) three times a day  hydrALAZINE Injectable 10 milliGRAM(s) every 4 hours PRN  influenza   Vaccine 0.5 milliLiter(s) once  insulin glargine Injectable (LANTUS) 14 Unit(s) at bedtime  insulin lispro (HumaLOG) corrective regimen sliding scale   three times a day before meals  insulin lispro (HumaLOG) corrective regimen sliding scale   at bedtime  morphine  - Injectable 0.5 milliGRAM(s) every 4 hours PRN  ondansetron Injectable 4 milliGRAM(s) every 6 hours PRN  oxyCODONE    5 mG/acetaminophen 325 mG 1 Tablet(s) every 4 hours PRN  pantoprazole    Tablet 40 milliGRAM(s) before breakfast  piperacillin/tazobactam IVPB. 3.375 Gram(s) every 8 hours  polyethylene glycol 3350 17 Gram(s) daily  senna 2 Tablet(s) at bedtime  sodium chloride 0.9%. 1000 milliLiter(s) <Continuous>      RECENT LABS/IMAGING  CBC Full  -  ( 23 Jan 2018 06:36 )  WBC Count : 7.9 K/uL  Hemoglobin : 11.3 g/dL  Hematocrit : 34.4 %  Platelet Count - Automated : 344 K/uL  Mean Cell Volume : 86.4 fl  Mean Cell Hemoglobin : 28.4 pg  Mean Cell Hemoglobin Concentration : 32.8 g/dL  Auto Neutrophil # : x  Auto Lymphocyte # : x  Auto Monocyte # : x  Auto Eosinophil # : x  Auto Basophil # : x  Auto Neutrophil % : x  Auto Lymphocyte % : x  Auto Monocyte % : x  Auto Eosinophil % : x  Auto Basophil % : x    01-23    139  |  104  |  21.0<H>  ----------------------------<  97  4.0   |  22.0  |  1.97<H>    Ca    8.9      23 Jan 2018 06:36  Phos  3.6     01-22  Mg     2.2     01-22    TPro  6.7  /  Alb  3.3  /  TBili  0.4  /  DBili  x   /  AST  16  /  ALT  16  /  AlkPhos  134<H>  01-23      -----------------------------------------  PHYSICAL EXAM  Constitutional - NAD, Comfortable  Neurologic Exam -                    Cognitive - Awake, Alert, AAO to self, situation     Communication - +dysarthria     Cranial Nerves - loss left nasolabial fold     Motor                     LEFT    UE - ShAB 1/5, EF 1/5, EE 1/5, WE 1/5,  1/5                    LEFT    LE - HF 1/5, KE 1/5, DF 0/5, PF 0/5     Tone - 3/4 on EF and KE	     Sensory - decreased sensation left UE and LE      Psychiatric - Fatigued, depressed   ----------------------------------------------------------------------------------------  ASSESSMENT/PLAN  67M with right thalamic and corona radiata ICH with intraventricular extension likely hypertensive with left hemiparesis, dysphagia, functional, gait and ADL impairments   Acute cholecystitis - s/p IR drain, Zosyn  Pain - Morphine, Percocet  Dysphagia - MS/Nectar   DVT PPX - SCDs, Lovenox  Rehab -  Medically/surgically being optimized. Have requested OT and PT to dedicate more time for his weakness, while awaiting medical optimization.     Recommend ACUTE inpatient rehabilitation for the functional deficits consisting of 3 hours of therapy/day & 24 hour RN/daily PMR physician for comorbid medical management.     Continue bedside therapy as well as OOB throughout the day with mobilization throughout the day with staff to maintain cardiopulmonary function and prevention of secondary complications related to debility.

## 2018-01-23 NOTE — PROGRESS NOTE ADULT - PROBLEM SELECTOR PLAN 1
S/p cholecystostomy drain by IR - to stay in place for 4-6 weeks, flush tubes daily to maintain patency  C/w zosyn day 6  Blood cultures negative to date  Drain cultures with moderate Enterococcus faecium

## 2018-01-24 DIAGNOSIS — R30.0 DYSURIA: ICD-10-CM

## 2018-01-24 LAB
ALBUMIN SERPL ELPH-MCNC: 3.4 G/DL — SIGNIFICANT CHANGE UP (ref 3.3–5.2)
ALP SERPL-CCNC: 134 U/L — HIGH (ref 40–120)
ALT FLD-CCNC: 20 U/L — SIGNIFICANT CHANGE UP
ANION GAP SERPL CALC-SCNC: 13 MMOL/L — SIGNIFICANT CHANGE UP (ref 5–17)
AST SERPL-CCNC: 25 U/L — SIGNIFICANT CHANGE UP
BASOPHILS # BLD AUTO: 0 K/UL — SIGNIFICANT CHANGE UP (ref 0–0.2)
BASOPHILS NFR BLD AUTO: 0.3 % — SIGNIFICANT CHANGE UP (ref 0–2)
BILIRUB SERPL-MCNC: 0.4 MG/DL — SIGNIFICANT CHANGE UP (ref 0.4–2)
BUN SERPL-MCNC: 22 MG/DL — HIGH (ref 8–20)
CALCIUM SERPL-MCNC: 9.1 MG/DL — SIGNIFICANT CHANGE UP (ref 8.6–10.2)
CHLORIDE SERPL-SCNC: 100 MMOL/L — SIGNIFICANT CHANGE UP (ref 98–107)
CO2 SERPL-SCNC: 22 MMOL/L — SIGNIFICANT CHANGE UP (ref 22–29)
CREAT SERPL-MCNC: 2.04 MG/DL — HIGH (ref 0.5–1.3)
CULTURE RESULTS: SIGNIFICANT CHANGE UP
EOSINOPHIL # BLD AUTO: 1 K/UL — HIGH (ref 0–0.5)
EOSINOPHIL NFR BLD AUTO: 11.4 % — HIGH (ref 0–5)
GLUCOSE BLDC GLUCOMTR-MCNC: 122 MG/DL — HIGH (ref 70–99)
GLUCOSE BLDC GLUCOMTR-MCNC: 153 MG/DL — HIGH (ref 70–99)
GLUCOSE BLDC GLUCOMTR-MCNC: 189 MG/DL — HIGH (ref 70–99)
GLUCOSE BLDC GLUCOMTR-MCNC: 85 MG/DL — SIGNIFICANT CHANGE UP (ref 70–99)
GLUCOSE SERPL-MCNC: 113 MG/DL — SIGNIFICANT CHANGE UP (ref 70–115)
HCT VFR BLD CALC: 33.3 % — LOW (ref 42–52)
HGB BLD-MCNC: 11.4 G/DL — LOW (ref 14–18)
LYMPHOCYTES # BLD AUTO: 1.7 K/UL — SIGNIFICANT CHANGE UP (ref 1–4.8)
LYMPHOCYTES # BLD AUTO: 18.6 % — LOW (ref 20–55)
MCHC RBC-ENTMCNC: 28.4 PG — SIGNIFICANT CHANGE UP (ref 27–31)
MCHC RBC-ENTMCNC: 34.2 G/DL — SIGNIFICANT CHANGE UP (ref 32–36)
MCV RBC AUTO: 82.8 FL — SIGNIFICANT CHANGE UP (ref 80–94)
MONOCYTES # BLD AUTO: 0.9 K/UL — HIGH (ref 0–0.8)
MONOCYTES NFR BLD AUTO: 10.3 % — HIGH (ref 3–10)
NEUTROPHILS # BLD AUTO: 5.2 K/UL — SIGNIFICANT CHANGE UP (ref 1.8–8)
NEUTROPHILS NFR BLD AUTO: 59 % — SIGNIFICANT CHANGE UP (ref 37–73)
ORGANISM # SPEC MICROSCOPIC CNT: SIGNIFICANT CHANGE UP
ORGANISM # SPEC MICROSCOPIC CNT: SIGNIFICANT CHANGE UP
PHOSPHATE SERPL-MCNC: 2.7 MG/DL — SIGNIFICANT CHANGE UP (ref 2.4–4.7)
PLATELET # BLD AUTO: 352 K/UL — SIGNIFICANT CHANGE UP (ref 150–400)
POTASSIUM SERPL-MCNC: 4.2 MMOL/L — SIGNIFICANT CHANGE UP (ref 3.5–5.3)
POTASSIUM SERPL-SCNC: 4.2 MMOL/L — SIGNIFICANT CHANGE UP (ref 3.5–5.3)
PROT SERPL-MCNC: 6.8 G/DL — SIGNIFICANT CHANGE UP (ref 6.6–8.7)
RBC # BLD: 4.02 M/UL — LOW (ref 4.6–6.2)
RBC # BLD: 4.1 M/UL — LOW (ref 4.6–6.2)
RBC # FLD: 12.6 % — SIGNIFICANT CHANGE UP (ref 11–15.6)
RETICS #: 6.1 K/UL — LOW (ref 25–125)
RETICS/RBC NFR: 1.5 % — SIGNIFICANT CHANGE UP (ref 0.5–2.5)
SODIUM SERPL-SCNC: 135 MMOL/L — SIGNIFICANT CHANGE UP (ref 135–145)
SPECIMEN SOURCE: SIGNIFICANT CHANGE UP
WBC # BLD: 8.9 K/UL — SIGNIFICANT CHANGE UP (ref 4.8–10.8)
WBC # FLD AUTO: 8.9 K/UL — SIGNIFICANT CHANGE UP (ref 4.8–10.8)

## 2018-01-24 PROCEDURE — 99233 SBSQ HOSP IP/OBS HIGH 50: CPT

## 2018-01-24 RX ORDER — HYDRALAZINE HCL 50 MG
50 TABLET ORAL THREE TIMES A DAY
Refills: 0 | Status: DISCONTINUED | OUTPATIENT
Start: 2018-01-24 | End: 2018-01-26

## 2018-01-24 RX ADMIN — ATORVASTATIN CALCIUM 40 MILLIGRAM(S): 80 TABLET, FILM COATED ORAL at 21:57

## 2018-01-24 RX ADMIN — Medication 10 MILLIGRAM(S): at 04:31

## 2018-01-24 RX ADMIN — Medication 81 MILLIGRAM(S): at 12:43

## 2018-01-24 RX ADMIN — OXYCODONE AND ACETAMINOPHEN 1 TABLET(S): 5; 325 TABLET ORAL at 13:28

## 2018-01-24 RX ADMIN — CARVEDILOL PHOSPHATE 25 MILLIGRAM(S): 80 CAPSULE, EXTENDED RELEASE ORAL at 05:16

## 2018-01-24 RX ADMIN — PIPERACILLIN AND TAZOBACTAM 25 GRAM(S): 4; .5 INJECTION, POWDER, LYOPHILIZED, FOR SOLUTION INTRAVENOUS at 05:16

## 2018-01-24 RX ADMIN — CARVEDILOL PHOSPHATE 25 MILLIGRAM(S): 80 CAPSULE, EXTENDED RELEASE ORAL at 18:18

## 2018-01-24 RX ADMIN — Medication 10 MILLIGRAM(S): at 09:21

## 2018-01-24 RX ADMIN — OXYCODONE AND ACETAMINOPHEN 1 TABLET(S): 5; 325 TABLET ORAL at 13:09

## 2018-01-24 RX ADMIN — Medication 1: at 18:17

## 2018-01-24 RX ADMIN — AMLODIPINE BESYLATE 10 MILLIGRAM(S): 2.5 TABLET ORAL at 05:16

## 2018-01-24 RX ADMIN — PANTOPRAZOLE SODIUM 40 MILLIGRAM(S): 20 TABLET, DELAYED RELEASE ORAL at 05:16

## 2018-01-24 RX ADMIN — Medication 100 MILLIGRAM(S): at 05:16

## 2018-01-24 RX ADMIN — Medication 1: at 12:42

## 2018-01-24 RX ADMIN — Medication 50 MILLIGRAM(S): at 13:28

## 2018-01-24 RX ADMIN — OXYCODONE AND ACETAMINOPHEN 1 TABLET(S): 5; 325 TABLET ORAL at 02:55

## 2018-01-24 RX ADMIN — Medication 50 MILLIGRAM(S): at 21:57

## 2018-01-24 RX ADMIN — ENOXAPARIN SODIUM 40 MILLIGRAM(S): 100 INJECTION SUBCUTANEOUS at 12:43

## 2018-01-24 RX ADMIN — Medication 25 MILLIGRAM(S): at 05:16

## 2018-01-24 NOTE — PROGRESS NOTE ADULT - PROBLEM SELECTOR PLAN 1
S/p cholecystostomy drain by IR - to stay in place for 4-6 weeks, flush tubes daily to maintain patency  Complete Zosyn today  Blood cultures negative to date  Drain cultures with moderate Enterococcus faecium

## 2018-01-24 NOTE — PROGRESS NOTE ADULT - SUBJECTIVE AND OBJECTIVE BOX
NEPHROLOGY INTERVAL HPI/OVERNIGHT EVENTS: No new events.    MEDICATIONS  (STANDING):  amLODIPine   Tablet 10 milliGRAM(s) Oral daily  aspirin  chewable 81 milliGRAM(s) Oral daily  atorvastatin 40 milliGRAM(s) Oral at bedtime  carvedilol 25 milliGRAM(s) Oral every 12 hours  dextrose 5%. 1000 milliLiter(s) (50 mL/Hr) IV Continuous <Continuous>  dextrose 50% Injectable 12.5 Gram(s) IV Push once  dextrose 50% Injectable 25 Gram(s) IV Push once  dextrose 50% Injectable 25 Gram(s) IV Push once  docusate sodium 100 milliGRAM(s) Oral three times a day  enoxaparin Injectable 40 milliGRAM(s) SubCutaneous daily  hydrALAZINE 25 milliGRAM(s) Oral three times a day  influenza   Vaccine 0.5 milliLiter(s) IntraMuscular once  insulin glargine Injectable (LANTUS) 14 Unit(s) SubCutaneous at bedtime  insulin lispro (HumaLOG) corrective regimen sliding scale   SubCutaneous three times a day before meals  insulin lispro (HumaLOG) corrective regimen sliding scale   SubCutaneous at bedtime  pantoprazole    Tablet 40 milliGRAM(s) Oral before breakfast  piperacillin/tazobactam IVPB. 3.375 Gram(s) IV Intermittent every 8 hours  polyethylene glycol 3350 17 Gram(s) Oral daily  senna 2 Tablet(s) Oral at bedtime  sodium chloride 0.9%. 1000 milliLiter(s) (75 mL/Hr) IV Continuous <Continuous>    MEDICATIONS  (PRN):  aluminum hydroxide/magnesium hydroxide/simethicone Suspension 30 milliLiter(s) Oral every 4 hours PRN Dyspepsia  bisacodyl Suppository 10 milliGRAM(s) Rectal daily PRN Constipation  dextrose Gel 1 Dose(s) Oral once PRN Blood Glucose LESS THAN 70 milliGRAM(s)/deciliter  glucagon  Injectable 1 milliGRAM(s) IntraMuscular once PRN Glucose LESS THAN 70 milligrams/deciliter  hydrALAZINE Injectable 10 milliGRAM(s) IV Push every 4 hours PRN sbp>160  morphine  - Injectable 0.5 milliGRAM(s) IV Push every 4 hours PRN Moderate Pain (4 - 6)  ondansetron Injectable 4 milliGRAM(s) IV Push every 6 hours PRN Nausea and/or Vomiting  oxyCODONE    5 mG/acetaminophen 325 mG 1 Tablet(s) Oral every 4 hours PRN Moderate Pain (4 - 6)      Allergies    No Known Allergies    Intolerances        Vital Signs Last 24 Hrs    T(C): 37 (23 Jan 2018 23:44), Max: 37.2 (23 Jan 2018 07:50)  T(F): 98.6 (23 Jan 2018 23:44), Max: 99 (23 Jan 2018 07:50)  HR: 68 (24 Jan 2018 05:04) (61 - 69)  BP: 168/78 (24 Jan 2018 05:04) (162/78 - 176/60)  BP(mean): --  RR: 18 (23 Jan 2018 23:44) (18 - 18)  SpO2: 99% (23 Jan 2018 23:44) (91% - 99%)  T(C): 37.2 (23 Jan 2018 07:50), Max: 37.6 (22 Jan 2018 23:13)  T(F): 99 (23 Jan 2018 07:50), Max: 99.6 (22 Jan 2018 23:13)  HR: 64 (23 Jan 2018 07:50) (56 - 64)  BP: 168/81 (23 Jan 2018 07:50) (150/74 - 175/87)  BP(mean): --  RR: 18 (23 Jan 2018 07:50) (18 - 20)  SpO2: 91% (23 Jan 2018 07:50) (91% - 97%)          PHYSICAL EXAM:    GENERAL: Comfortable  in bed  HEAD:  same  EYES:  wnl  ENMT:   NECK: veins not  full  NERVOUS SYSTEM:  awake, verbal, no focal defect noted  CHEST/LUNG: clear  HEART: no rub or gallop  noted  ABDOMEN: soft, gall bladder drain noted  EXTREMITIES:  no edema  LYMPH:   SKIN: no rash    LABS: 01-24    135  |  100  |  22.0<H>  ----------------------------<  113  4.2   |  22.0  |  2.04<H>    Ca    9.1      24 Jan 2018 06:28  Phos  2.7     01-24    TPro  6.8  /  Alb  3.4  /  TBili  0.4  /  DBili  x   /  AST  25  /  ALT  20  /  AlkPhos  134<H>  01-24                          10.8   8.4   )-----------( 301      ( 22 Jan 2018 06:13 )             32.3     01-23    139  |  104  |  21.0<H>  ----------------------------<  97  4.0   |  22.0  |  1.97<H>    Ca    8.9      23 Jan 2018 06:36  Phos  3.6     01-22  Mg     2.2     01-22    TPro  6.7  /  Alb  3.3  /  TBili  0.4  /  DBili  x   /  AST  16  /  ALT  16  /  AlkPhos  134<H>  01-23                RADIOLOGY & ADDITIONAL TESTS:

## 2018-01-24 NOTE — PROGRESS NOTE ADULT - SUBJECTIVE AND OBJECTIVE BOX
CC: stroke     HPI:  67 yom transferred from [place] after having left sided weakness found to have ICH likely hypertensive. Pt was initially managed in ICU until stable, stroke workup complete, neurology following.  Cardio decided to cath pt yesterday as part of workup and pt was NPO, post procedure he was found to be extremely hypertensive with HTN crisis and was brought to ICU BP mgt.  Pt was found to have a 70% midLAD lesion that will be medically managed for now due to fact that he is currently not candidate for ASA/Plavix due to ICH> spoke with cardio NP and pt not on ASA/Plavix at this time due to ICH and they are aware of this, eventually they will d/w neurology/NS about when it will be safe to restart these meds. s/p ICU downgrade. Hospital course complicated by abdominal pain, N/V suspected acute brooks. Now s/p cholecystostomy by IR, on IV ab. Cr improving with IVF      INTERVAL HPI/OVERNIGHT EVENTS: Patient seen and examined lying in bed.  Patient complaining of burning with urination.  Patient denies any headache, dizziness, SOB, CP, abdominal pain, nausea, vomiting.  (+) loose stool this am.      Vital Signs Last 24 Hrs  T(C): 36.7 (24 Jan 2018 07:56), Max: 37 (23 Jan 2018 23:44)  T(F): 98 (24 Jan 2018 07:56), Max: 98.6 (23 Jan 2018 23:44)  HR: 68 (24 Jan 2018 09:13) (61 - 69)  BP: 176/74 (24 Jan 2018 09:13) (162/78 - 181/83)  BP(mean): --  RR: 20 (24 Jan 2018 07:56) (18 - 20)  SpO2: 98% (24 Jan 2018 07:56) (97% - 99%)  I&O's Detail    23 Jan 2018 07:01  -  24 Jan 2018 07:00  --------------------------------------------------------  IN:    Oral Fluid: 960 mL    sodium chloride 0.9%: 75 mL  Total IN: 1035 mL    OUT:    Drain: 385 mL    Voided: 125 mL  Total OUT: 510 mL    Total NET: 525 mL      24 Jan 2018 07:01  -  24 Jan 2018 12:12  --------------------------------------------------------  IN:    sodium chloride 0.9%: 150 mL  Total IN: 150 mL    OUT:  Total OUT: 0 mL    Total NET: 150 mL      PHYSICAL EXAM:  GENERAL: NAD, well-groomed, well-developed  HEAD:  Atraumatic, Normocephalic  NECK: Supple, No JVD, Normal thyroid  NERVOUS SYSTEM:  Alert & Oriented X3, Good concentration; Left sided weakness  CHEST/LUNG: Clear to auscultation bilaterally; No rales, rhonchi, wheezing, or rubs  HEART: Regular rate and rhythm; No murmurs, rubs, or gallops  ABDOMEN: Soft, Nontender, Nondistended; Bowel sounds present; (+) Brooks tube with bilious drainage  EXTREMITIES:  2+ Peripheral Pulses, No clubbing, cyanosis, or edema                                11.4   8.9   )-----------( 352      ( 24 Jan 2018 06:28 )             33.3     24 Jan 2018 06:28    135    |  100    |  22.0   ----------------------------<  113    4.2     |  22.0   |  2.04     Ca    9.1        24 Jan 2018 06:28  Phos  2.7       24 Jan 2018 06:28    TPro  6.8    /  Alb  3.4    /  TBili  0.4    /  DBili  x      /  AST  25     /  ALT  20     /  AlkPhos  134    24 Jan 2018 06:28      CAPILLARY BLOOD GLUCOSE      POCT Blood Glucose.: 122 mg/dL (24 Jan 2018 08:44)  POCT Blood Glucose.: 183 mg/dL (23 Jan 2018 22:11)  POCT Blood Glucose.: 150 mg/dL (23 Jan 2018 17:15)    LIVER FUNCTIONS - ( 24 Jan 2018 06:28 )  Alb: 3.4 g/dL / Pro: 6.8 g/dL / ALK PHOS: 134 U/L / ALT: 20 U/L / AST: 25 U/L / GGT: x               MEDICATIONS  (STANDING):  amLODIPine   Tablet 10 milliGRAM(s) Oral daily  aspirin  chewable 81 milliGRAM(s) Oral daily  atorvastatin 40 milliGRAM(s) Oral at bedtime  carvedilol 25 milliGRAM(s) Oral every 12 hours  dextrose 5%. 1000 milliLiter(s) (50 mL/Hr) IV Continuous <Continuous>  dextrose 50% Injectable 12.5 Gram(s) IV Push once  dextrose 50% Injectable 25 Gram(s) IV Push once  dextrose 50% Injectable 25 Gram(s) IV Push once  enoxaparin Injectable 40 milliGRAM(s) SubCutaneous daily  hydrALAZINE 50 milliGRAM(s) Oral three times a day  influenza   Vaccine 0.5 milliLiter(s) IntraMuscular once  insulin glargine Injectable (LANTUS) 14 Unit(s) SubCutaneous at bedtime  insulin lispro (HumaLOG) corrective regimen sliding scale   SubCutaneous three times a day before meals  insulin lispro (HumaLOG) corrective regimen sliding scale   SubCutaneous at bedtime  pantoprazole    Tablet 40 milliGRAM(s) Oral before breakfast    MEDICATIONS  (PRN):  aluminum hydroxide/magnesium hydroxide/simethicone Suspension 30 milliLiter(s) Oral every 4 hours PRN Dyspepsia  dextrose Gel 1 Dose(s) Oral once PRN Blood Glucose LESS THAN 70 milliGRAM(s)/deciliter  glucagon  Injectable 1 milliGRAM(s) IntraMuscular once PRN Glucose LESS THAN 70 milligrams/deciliter  hydrALAZINE Injectable 10 milliGRAM(s) IV Push every 4 hours PRN sbp>160  morphine  - Injectable 0.5 milliGRAM(s) IV Push every 4 hours PRN Moderate Pain (4 - 6)  ondansetron Injectable 4 milliGRAM(s) IV Push every 6 hours PRN Nausea and/or Vomiting  oxyCODONE    5 mG/acetaminophen 325 mG 1 Tablet(s) Oral every 4 hours PRN Moderate Pain (4 - 6)

## 2018-01-24 NOTE — PROGRESS NOTE ADULT - SUBJECTIVE AND OBJECTIVE BOX
Patient in bed. Complaining of a headache that started about 10 minutes ago.  Ate lunch. Having stiffness of the left UE and LE.  Medically is stable. ARF is improved. IVF have been DCed.     FUNCTIONAL PROGRESS  1/24  Bed Mobility  Bed Mobility Training Rehab Potential: good, to achieve stated therapy goals  Bed Mobility Training Rolling/Turning: moderate assist (50% patient effort);  1 person assist;  verbal cues;  bed rails  Bed Mobility Training Scooting: moderate assist (50% patient effort);  1 person assist;  verbal cues;  bed rails  Bed Mobility Training Bridging: moderate assist (50% patient effort);  1 person assist;  verbal cues;  bed rails  Bed Mobility Training Sit-to-Supine: maximum assist (25% patient effort);  1 person assist;  verbal cues;  bed rails  Bed Mobility Training Supine-to-Sit: maximum assist (25% patient effort);  1 person assist;  verbal cues;  bed rails  Bed Mobility Training Limitations: decreased ability to use arms for pushing/pulling;  decreased ability to use legs for bridging/pushing;  impaired ability to control trunk for mobility;  decreased ROM;  decreased sensation;  decreased strength;  impaired balance;  impaired coordination;  impaired motor control    Bed-Chair Transfer Training  Bed-to-Chair Transfer Training Rehab Potential: good, to achieve stated therapy goals  Bed-to-Chair Transfer Training Treatment Not Performed: patient unavailable for treatment,  to get into chair 2 bowel movements as per RN  Bed-to-Chair Transfer Training Symptoms Noted During/After Treatment: none    REVIEW OF SYSTEMS  Constitutional - No fever,  No fatigue  HEENT - No vertigo, No neck pain  Neurological - +headaches, No memory loss, +loss of strength, +numbness, No tremors  Musculoskeletal - +joint pain     VITALS  T(C): 36.7 (01-24-18 @ 07:56), Max: 37 (01-23-18 @ 23:44)  HR: 68 (01-24-18 @ 09:13) (61 - 69)  BP: 176/74 (01-24-18 @ 09:13) (162/78 - 181/83)  RR: 20 (01-24-18 @ 07:56) (18 - 20)  SpO2: 98% (01-24-18 @ 07:56) (97% - 99%)  Wt(kg): --    MEDICATIONS   aluminum hydroxide/magnesium hydroxide/simethicone Suspension 30 milliLiter(s) every 4 hours PRN  amLODIPine   Tablet 10 milliGRAM(s) daily  aspirin  chewable 81 milliGRAM(s) daily  atorvastatin 40 milliGRAM(s) at bedtime  carvedilol 25 milliGRAM(s) every 12 hours  dextrose 5%. 1000 milliLiter(s) <Continuous>  dextrose 50% Injectable 12.5 Gram(s) once  dextrose 50% Injectable 25 Gram(s) once  dextrose 50% Injectable 25 Gram(s) once  dextrose Gel 1 Dose(s) once PRN  enoxaparin Injectable 40 milliGRAM(s) daily  glucagon  Injectable 1 milliGRAM(s) once PRN  hydrALAZINE 50 milliGRAM(s) three times a day  hydrALAZINE Injectable 10 milliGRAM(s) every 4 hours PRN  influenza   Vaccine 0.5 milliLiter(s) once  insulin glargine Injectable (LANTUS) 14 Unit(s) at bedtime  insulin lispro (HumaLOG) corrective regimen sliding scale   three times a day before meals  insulin lispro (HumaLOG) corrective regimen sliding scale   at bedtime  morphine  - Injectable 0.5 milliGRAM(s) every 4 hours PRN  ondansetron Injectable 4 milliGRAM(s) every 6 hours PRN  oxyCODONE    5 mG/acetaminophen 325 mG 1 Tablet(s) every 4 hours PRN  pantoprazole    Tablet 40 milliGRAM(s) before breakfast      RECENT LABS/IMAGING  CBC Full  -  ( 24 Jan 2018 06:28 )  WBC Count : 8.9 K/uL  Hemoglobin : 11.4 g/dL  Hematocrit : 33.3 %  Platelet Count - Automated : 352 K/uL  Mean Cell Volume : 82.8 fl  Mean Cell Hemoglobin : 28.4 pg  Mean Cell Hemoglobin Concentration : 34.2 g/dL  Auto Neutrophil # : 5.2 K/uL  Auto Lymphocyte # : 1.7 K/uL  Auto Monocyte # : 0.9 K/uL  Auto Eosinophil # : 1.0 K/uL  Auto Basophil # : 0.0 K/uL  Auto Neutrophil % : 59.0 %  Auto Lymphocyte % : 18.6 %  Auto Monocyte % : 10.3 %  Auto Eosinophil % : 11.4 %  Auto Basophil % : 0.3 %    01-24    135  |  100  |  22.0<H>  ----------------------------<  113  4.2   |  22.0  |  2.04<H>    Ca    9.1      24 Jan 2018 06:28  Phos  2.7     01-24    TPro  6.8  /  Alb  3.4  /  TBili  0.4  /  DBili  x   /  AST  25  /  ALT  20  /  AlkPhos  134<H>  01-24      -----------------------------------------  PHYSICAL EXAM  Constitutional - NAD, Comfortable  Neurologic Exam -                    Cognitive - Awake, Alert, AAO to self, situation, H     Communication - +dysarthria     Cranial Nerves - loss left nasolabial fold     Motor                     LEFT    UE - ShAB 1/5, EF 1/5, EE 1/5, WE 1/5,  1/5                    LEFT    LE - HF 1/5, KE 1/5, DF 0/5, PF 0/5     Tone - 3/4 on EF and KE	     Sensory - decreased sensation left UE and LE      Psychiatric - Fatigued   ----------------------------------------------------------------------------------------  ASSESSMENT/PLAN  67M with 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 Recommend ACUTE inpatient rehabilitation for the functional deficits consisting of 3 hours of therapy/day & 24 hour RN/daily PMR physician for comorbid medical management.     Continue bedside therapy as well as OOB throughout the day with mobilization throughout the day with staff to maintain cardiopulmonary function and prevention of secondary complications related to debility.

## 2018-01-25 LAB
ALBUMIN SERPL ELPH-MCNC: 3.3 G/DL — SIGNIFICANT CHANGE UP (ref 3.3–5.2)
ALP SERPL-CCNC: 124 U/L — HIGH (ref 40–120)
ALT FLD-CCNC: 29 U/L — SIGNIFICANT CHANGE UP
ANION GAP SERPL CALC-SCNC: 16 MMOL/L — SIGNIFICANT CHANGE UP (ref 5–17)
APPEARANCE UR: CLEAR — SIGNIFICANT CHANGE UP
AST SERPL-CCNC: 37 U/L — SIGNIFICANT CHANGE UP
BILIRUB SERPL-MCNC: 0.4 MG/DL — SIGNIFICANT CHANGE UP (ref 0.4–2)
BILIRUB UR-MCNC: NEGATIVE — SIGNIFICANT CHANGE UP
BODY SURFACE AREA CALCULATION: 1.73 M2 — SIGNIFICANT CHANGE UP
BUN SERPL-MCNC: 22 MG/DL — HIGH (ref 8–20)
CALCIUM SERPL-MCNC: 9.4 MG/DL — SIGNIFICANT CHANGE UP (ref 8.6–10.2)
CHLORIDE SERPL-SCNC: 99 MMOL/L — SIGNIFICANT CHANGE UP (ref 98–107)
CO2 SERPL-SCNC: 20 MMOL/L — LOW (ref 22–29)
COLLECT DURATION TIME UR: 24 HR — SIGNIFICANT CHANGE UP
COLOR SPEC: YELLOW — SIGNIFICANT CHANGE UP
CREAT CL ?TM UR+SERPL-VRATE: 4 ML/MIN — LOW (ref 85–125)
CREAT SERPL-MCNC: 2.05 MG/DL — HIGH (ref 0.5–1.3)
CREAT SERPL-MCNC: 2.05 MG/DL — HIGH (ref 0.5–1.3)
DIFF PNL FLD: NEGATIVE — SIGNIFICANT CHANGE UP
GLUCOSE BLDC GLUCOMTR-MCNC: 131 MG/DL — HIGH (ref 70–99)
GLUCOSE BLDC GLUCOMTR-MCNC: 137 MG/DL — HIGH (ref 70–99)
GLUCOSE BLDC GLUCOMTR-MCNC: 173 MG/DL — HIGH (ref 70–99)
GLUCOSE BLDC GLUCOMTR-MCNC: 225 MG/DL — HIGH (ref 70–99)
GLUCOSE SERPL-MCNC: 116 MG/DL — HIGH (ref 70–115)
GLUCOSE UR QL: NEGATIVE MG/DL — SIGNIFICANT CHANGE UP
HCT VFR BLD CALC: 34.6 % — LOW (ref 42–52)
HGB BLD-MCNC: 11.9 G/DL — LOW (ref 14–18)
KETONES UR-MCNC: NEGATIVE — SIGNIFICANT CHANGE UP
LEUKOCYTE ESTERASE UR-ACNC: NEGATIVE — SIGNIFICANT CHANGE UP
MCHC RBC-ENTMCNC: 28.6 PG — SIGNIFICANT CHANGE UP (ref 27–31)
MCHC RBC-ENTMCNC: 34.4 G/DL — SIGNIFICANT CHANGE UP (ref 32–36)
MCV RBC AUTO: 83.2 FL — SIGNIFICANT CHANGE UP (ref 80–94)
NITRITE UR-MCNC: NEGATIVE — SIGNIFICANT CHANGE UP
PH UR: 6 — SIGNIFICANT CHANGE UP (ref 5–8)
PLATELET # BLD AUTO: 389 K/UL — SIGNIFICANT CHANGE UP (ref 150–400)
POTASSIUM SERPL-MCNC: 4.3 MMOL/L — SIGNIFICANT CHANGE UP (ref 3.5–5.3)
POTASSIUM SERPL-SCNC: 4.3 MMOL/L — SIGNIFICANT CHANGE UP (ref 3.5–5.3)
PROT 24H UR-MRATE: 52 MG/24HR — SIGNIFICANT CHANGE UP (ref 50–100)
PROT SERPL-MCNC: 7.4 G/DL — SIGNIFICANT CHANGE UP (ref 6.6–8.7)
PROT UR-MCNC: 30 MG/DL
RBC # BLD: 4.16 M/UL — LOW (ref 4.6–6.2)
RBC # FLD: 12.8 % — SIGNIFICANT CHANGE UP (ref 11–15.6)
SODIUM SERPL-SCNC: 135 MMOL/L — SIGNIFICANT CHANGE UP (ref 135–145)
SP GR SPEC: 1.01 — SIGNIFICANT CHANGE UP (ref 1.01–1.02)
TOTAL VOLUME - 24 HOUR: 200 ML — SIGNIFICANT CHANGE UP
URINE CREATININE CALCULATION: 0.1 G/24 HR — LOW (ref 1–2)
UROBILINOGEN FLD QL: NEGATIVE MG/DL — SIGNIFICANT CHANGE UP
WBC # BLD: 9.6 K/UL — SIGNIFICANT CHANGE UP (ref 4.8–10.8)
WBC # FLD AUTO: 9.6 K/UL — SIGNIFICANT CHANGE UP (ref 4.8–10.8)
WBC UR QL: SIGNIFICANT CHANGE UP

## 2018-01-25 PROCEDURE — 99232 SBSQ HOSP IP/OBS MODERATE 35: CPT

## 2018-01-25 PROCEDURE — 99233 SBSQ HOSP IP/OBS HIGH 50: CPT

## 2018-01-25 RX ORDER — OXYCODONE AND ACETAMINOPHEN 5; 325 MG/1; MG/1
1 TABLET ORAL EVERY 4 HOURS
Refills: 0 | Status: CANCELLED | OUTPATIENT
Start: 2018-01-30 | End: 2018-01-26

## 2018-01-25 RX ORDER — FUROSEMIDE 40 MG
20 TABLET ORAL DAILY
Refills: 0 | Status: DISCONTINUED | OUTPATIENT
Start: 2018-01-25 | End: 2018-01-26

## 2018-01-25 RX ADMIN — Medication 50 MILLIGRAM(S): at 21:48

## 2018-01-25 RX ADMIN — AMLODIPINE BESYLATE 10 MILLIGRAM(S): 2.5 TABLET ORAL at 05:27

## 2018-01-25 RX ADMIN — OXYCODONE AND ACETAMINOPHEN 1 TABLET(S): 5; 325 TABLET ORAL at 13:00

## 2018-01-25 RX ADMIN — Medication 50 MILLIGRAM(S): at 14:14

## 2018-01-25 RX ADMIN — PANTOPRAZOLE SODIUM 40 MILLIGRAM(S): 20 TABLET, DELAYED RELEASE ORAL at 05:27

## 2018-01-25 RX ADMIN — Medication 81 MILLIGRAM(S): at 12:02

## 2018-01-25 RX ADMIN — Medication 50 MILLIGRAM(S): at 05:28

## 2018-01-25 RX ADMIN — MORPHINE SULFATE 0.5 MILLIGRAM(S): 50 CAPSULE, EXTENDED RELEASE ORAL at 21:48

## 2018-01-25 RX ADMIN — MORPHINE SULFATE 0.5 MILLIGRAM(S): 50 CAPSULE, EXTENDED RELEASE ORAL at 22:00

## 2018-01-25 RX ADMIN — Medication 1: at 17:25

## 2018-01-25 RX ADMIN — CARVEDILOL PHOSPHATE 25 MILLIGRAM(S): 80 CAPSULE, EXTENDED RELEASE ORAL at 05:28

## 2018-01-25 RX ADMIN — Medication 0.1 MILLIGRAM(S): at 17:26

## 2018-01-25 RX ADMIN — OXYCODONE AND ACETAMINOPHEN 1 TABLET(S): 5; 325 TABLET ORAL at 12:01

## 2018-01-25 RX ADMIN — Medication 20 MILLIGRAM(S): at 12:02

## 2018-01-25 RX ADMIN — CARVEDILOL PHOSPHATE 25 MILLIGRAM(S): 80 CAPSULE, EXTENDED RELEASE ORAL at 17:26

## 2018-01-25 RX ADMIN — INSULIN GLARGINE 14 UNIT(S): 100 INJECTION, SOLUTION SUBCUTANEOUS at 21:48

## 2018-01-25 RX ADMIN — ENOXAPARIN SODIUM 40 MILLIGRAM(S): 100 INJECTION SUBCUTANEOUS at 12:04

## 2018-01-25 RX ADMIN — ATORVASTATIN CALCIUM 40 MILLIGRAM(S): 80 TABLET, FILM COATED ORAL at 21:48

## 2018-01-25 NOTE — PROGRESS NOTE ADULT - ATTENDING COMMENTS
Continue present meds, antibiotic. labs in am.
D/C iv fluid.
added lasix and clonidine for bp.
NSGY Attg:    see above  patient seen and examined  answers questions  + commands with left  RHP    CT/A/V reviewed -- felipe ICH with IVH, no aneurysm, no VST as suggested on initial CT  CT c-spine -- no fx    A/P:  - cont supportive care per MICU
Abd pain, N/V- RUQ tenderness, Abd US
Agree with above with the following additions:    stable for transfer to floor  Continue oral HTN meds  Pt/OT will need

## 2018-01-25 NOTE — PROGRESS NOTE ADULT - SUBJECTIVE AND OBJECTIVE BOX
NEPHROLOGY INTERVAL HPI/OVERNIGHT EVENTS: No new events.    MEDICATIONS  (STANDING):  amLODIPine   Tablet 10 milliGRAM(s) Oral daily  aspirin  chewable 81 milliGRAM(s) Oral daily  atorvastatin 40 milliGRAM(s) Oral at bedtime  carvedilol 25 milliGRAM(s) Oral every 12 hours  dextrose 5%. 1000 milliLiter(s) (50 mL/Hr) IV Continuous <Continuous>  dextrose 50% Injectable 12.5 Gram(s) IV Push once  dextrose 50% Injectable 25 Gram(s) IV Push once  dextrose 50% Injectable 25 Gram(s) IV Push once  docusate sodium 100 milliGRAM(s) Oral three times a day  enoxaparin Injectable 40 milliGRAM(s) SubCutaneous daily  hydrALAZINE 25 milliGRAM(s) Oral three times a day  influenza   Vaccine 0.5 milliLiter(s) IntraMuscular once  insulin glargine Injectable (LANTUS) 14 Unit(s) SubCutaneous at bedtime  insulin lispro (HumaLOG) corrective regimen sliding scale   SubCutaneous three times a day before meals  insulin lispro (HumaLOG) corrective regimen sliding scale   SubCutaneous at bedtime  pantoprazole    Tablet 40 milliGRAM(s) Oral before breakfast  piperacillin/tazobactam IVPB. 3.375 Gram(s) IV Intermittent every 8 hours  polyethylene glycol 3350 17 Gram(s) Oral daily  senna 2 Tablet(s) Oral at bedtime  sodium chloride 0.9%. 1000 milliLiter(s) (75 mL/Hr) IV Continuous <Continuous>    MEDICATIONS  (PRN):  aluminum hydroxide/magnesium hydroxide/simethicone Suspension 30 milliLiter(s) Oral every 4 hours PRN Dyspepsia  bisacodyl Suppository 10 milliGRAM(s) Rectal daily PRN Constipation  dextrose Gel 1 Dose(s) Oral once PRN Blood Glucose LESS THAN 70 milliGRAM(s)/deciliter  glucagon  Injectable 1 milliGRAM(s) IntraMuscular once PRN Glucose LESS THAN 70 milligrams/deciliter  hydrALAZINE Injectable 10 milliGRAM(s) IV Push every 4 hours PRN sbp>160  morphine  - Injectable 0.5 milliGRAM(s) IV Push every 4 hours PRN Moderate Pain (4 - 6)  ondansetron Injectable 4 milliGRAM(s) IV Push every 6 hours PRN Nausea and/or Vomiting  oxyCODONE    5 mG/acetaminophen 325 mG 1 Tablet(s) Oral every 4 hours PRN Moderate Pain (4 - 6)      Allergies    No Known Allergies          Vital Signs Last 24 Hrs    T(C): 36.6 (24 Jan 2018 22:29), Max: 37.1 (24 Jan 2018 15:53)  T(F): 97.9 (24 Jan 2018 22:29), Max: 98.8 (24 Jan 2018 15:53)  HR: 73 (25 Jan 2018 05:26) (64 - 73)  BP: 179/93 (25 Jan 2018 05:26) (164/72 - 185/90)  BP(mean): --  RR: 18 (24 Jan 2018 22:03) (18 - 18)  SpO2: 98% (24 Jan 2018 22:03) (97% - 98%)    T(C): 37 (23 Jan 2018 23:44), Max: 37.2 (23 Jan 2018 07:50)  T(F): 98.6 (23 Jan 2018 23:44), Max: 99 (23 Jan 2018 07:50)  HR: 68 (24 Jan 2018 05:04) (61 - 69)  BP: 168/78 (24 Jan 2018 05:04) (162/78 - 176/60)  BP(mean): --  RR: 18 (23 Jan 2018 23:44) (18 - 18)  SpO2: 99% (23 Jan 2018 23:44) (91% - 99%)  T(C): 37.2 (23 Jan 2018 07:50), Max: 37.6 (22 Jan 2018 23:13)  T(F): 99 (23 Jan 2018 07:50), Max: 99.6 (22 Jan 2018 23:13)  HR: 64 (23 Jan 2018 07:50) (56 - 64)  BP: 168/81 (23 Jan 2018 07:50) (150/74 - 175/87)  BP(mean): --  RR: 18 (23 Jan 2018 07:50) (18 - 20)  SpO2: 91% (23 Jan 2018 07:50) (91% - 97%)          PHYSICAL EXAM:    GENERAL: Comfortable  in bed  HEAD:  same  EYES:  wnl  ENMT:   NECK: veins not  full  NERVOUS SYSTEM:  awake, verbal,   CHEST/LUNG: clear  HEART: no rub or gallop  noted  ABDOMEN: soft, gall bladder drain noted  EXTREMITIES:  no edema  LYMPH:   SKIN: no rash    LABS: 01-24    135  |  100  |  22.0<H>  ----------------------------<  113  4.2   |  22.0  |  2.04<H>    Ca    9.1      24 Jan 2018 06:28  Phos  2.7     01-24    TPro  6.8  /  Alb  3.4  /  TBili  0.4  /  DBili  x   /  AST  25  /  ALT  20  /  AlkPhos  134<H>  01-24                          10.8   8.4   )-----------( 301      ( 22 Jan 2018 06:13 )             32.3     01-23    139  |  104  |  21.0<H>  ----------------------------<  97  4.0   |  22.0  |  1.97<H>    Ca    8.9      23 Jan 2018 06:36  Phos  3.6     01-22  Mg     2.2     01-22    TPro  6.7  /  Alb  3.3  /  TBili  0.4  /  DBili  x   /  AST  16  /  ALT  16  /  AlkPhos  134<H>  01-23                RADIOLOGY & ADDITIONAL TESTS:

## 2018-01-25 NOTE — PROGRESS NOTE ADULT - PROBLEM SELECTOR PLAN 1
S/p cholecystostomy drain by IR - to stay in place for 4-6 weeks, flush tubes daily to maintain patency  Completed Zosyn  Blood cultures negative to date  Drain cultures with moderate Enterococcus faecium

## 2018-01-25 NOTE — PROGRESS NOTE ADULT - SUBJECTIVE AND OBJECTIVE BOX
CC: stroke    HPI:  67 yom transferred from [place] after having left sided weakness found to have ICH likely hypertensive. Pt was initially managed in ICU until stable, stroke workup complete, neurology following.  Cardio decided to cath pt yesterday as part of workup and pt was NPO, post procedure he was found to be extremely hypertensive with HTN crisis and was brought to ICU BP mgt.  Pt was found to have a 70% midLAD lesion that will be medically managed for now due to fact that he is currently not candidate for ASA/Plavix due to ICH> spoke with cardio NP and pt not on ASA/Plavix at this time due to ICH and they are aware of this, eventually they will d/w neurology/NS about when it will be safe to restart these meds. s/p ICU downgrade. Hospital course complicated by abdominal pain, N/V suspected acute brooks. Now s/p cholecystostomy by IR, on IV ab. Cr improving with IVF.    INTERVAL HPI/OVERNIGHT EVENTS:  Patient seen and examined sitting up in the chair.  Patient complaining of lower back pain.  Patient denies any headache, dizziness, SOB, CP abdominal pain, nausea, vomiting, dysuria.  Other ROS reviewed and are negative.    Vital Signs Last 24 Hrs  T(C): 36.9 (2018 07:33), Max: 37.1 (2018 15:53)  T(F): 98.4 (2018 07:33), Max: 98.8 (2018 15:53)  HR: 65 (2018 07:33) (64 - 73)  BP: 183/92 (2018 07:33) (164/72 - 185/90)  BP(mean): --  RR: 18 (2018 07:33) (18 - 18)  SpO2: 99% (2018 07:33) (97% - 99%)  I&O's Detail    2018 07:01  -  2018 07:00  --------------------------------------------------------  IN:    sodium chloride 0.9%: 150 mL  Total IN: 150 mL    OUT:    Drain: 375 mL  Total OUT: 375 mL    Total NET: -225 mL    PHYSICAL EXAM:  GENERAL: NAD, well-groomed, well-developed  HEAD:  Atraumatic, Normocephalic  NECK: Supple, No JVD, Normal thyroid  NERVOUS SYSTEM:  Alert & Oriented X3, Good concentration; left sided weakness  CHEST/LUNG: Clear to auscultation bilaterally; No rales, rhonchi, wheezing, or rubs  HEART: Regular rate and rhythm; No murmurs, rubs, or gallops  ABDOMEN: Soft, Nontender, Nondistended; Bowel sounds present; Bradford tube with bilious drainage  EXTREMITIES:  2+ Peripheral Pulses, No clubbing, cyanosis, or edema                                  11.9   9.6   )-----------( 389      ( 2018 06:15 )             34.6     2018 06:15    135    |  99     |  22.0   ----------------------------<  116    4.3     |  20.0   |  2.05     Ca    9.4        2018 06:15  Phos  2.7       2018 06:28    TPro  7.4    /  Alb  3.3    /  TBili  0.4    /  DBili  x      /  AST  37     /  ALT  29     /  AlkPhos  124    2018 06:15      CAPILLARY BLOOD GLUCOSE      POCT Blood Glucose.: 131 mg/dL (2018 07:37)  POCT Blood Glucose.: 85 mg/dL (2018 21:51)  POCT Blood Glucose.: 153 mg/dL (2018 18:16)  POCT Blood Glucose.: 189 mg/dL (2018 12:41)    LIVER FUNCTIONS - ( 2018 06:15 )  Alb: 3.3 g/dL / Pro: 7.4 g/dL / ALK PHOS: 124 U/L / ALT: 29 U/L / AST: 37 U/L / GGT: x           Urinalysis Basic - ( 2018 11:24 )    Color: Yellow / Appearance: Clear / S.010 / pH: x  Gluc: x / Ketone: Negative  / Bili: Negative / Urobili: Negative mg/dL   Blood: x / Protein: 30 mg/dL / Nitrite: Negative   Leuk Esterase: Negative / RBC: x / WBC x   Sq Epi: x / Non Sq Epi: x / Bacteria: x        MEDICATIONS  (STANDING):  amLODIPine   Tablet 10 milliGRAM(s) Oral daily  aspirin  chewable 81 milliGRAM(s) Oral daily  atorvastatin 40 milliGRAM(s) Oral at bedtime  carvedilol 25 milliGRAM(s) Oral every 12 hours  cloNIDine 0.1 milliGRAM(s) Oral two times a day  dextrose 5%. 1000 milliLiter(s) (50 mL/Hr) IV Continuous <Continuous>  dextrose 50% Injectable 12.5 Gram(s) IV Push once  dextrose 50% Injectable 25 Gram(s) IV Push once  dextrose 50% Injectable 25 Gram(s) IV Push once  enoxaparin Injectable 40 milliGRAM(s) SubCutaneous daily  furosemide    Tablet 20 milliGRAM(s) Oral daily  hydrALAZINE 50 milliGRAM(s) Oral three times a day  influenza   Vaccine 0.5 milliLiter(s) IntraMuscular once  insulin glargine Injectable (LANTUS) 14 Unit(s) SubCutaneous at bedtime  insulin lispro (HumaLOG) corrective regimen sliding scale   SubCutaneous three times a day before meals  insulin lispro (HumaLOG) corrective regimen sliding scale   SubCutaneous at bedtime  pantoprazole    Tablet 40 milliGRAM(s) Oral before breakfast    MEDICATIONS  (PRN):  aluminum hydroxide/magnesium hydroxide/simethicone Suspension 30 milliLiter(s) Oral every 4 hours PRN Dyspepsia  dextrose Gel 1 Dose(s) Oral once PRN Blood Glucose LESS THAN 70 milliGRAM(s)/deciliter  glucagon  Injectable 1 milliGRAM(s) IntraMuscular once PRN Glucose LESS THAN 70 milligrams/deciliter  hydrALAZINE Injectable 10 milliGRAM(s) IV Push every 4 hours PRN sbp>160  morphine  - Injectable 0.5 milliGRAM(s) IV Push every 4 hours PRN Moderate Pain (4 - 6)  ondansetron Injectable 4 milliGRAM(s) IV Push every 6 hours PRN Nausea and/or Vomiting  oxyCODONE    5 mG/acetaminophen 325 mG 1 Tablet(s) Oral every 4 hours PRN Moderate Pain (4 - 6) CC: stroke    HPI:  67 yom transferred from [place] after having left sided weakness found to have ICH likely hypertensive. Pt was initially managed in ICU until stable, stroke workup complete, neurology following.  Cardio decided to cath pt yesterday as part of workup and pt was NPO, post procedure he was found to be extremely hypertensive with HTN crisis and was brought to ICU BP mgt.  Pt was found to have a 70% midLAD lesion that will be medically managed for now due to fact that he is currently not candidate for ASA/Plavix due to ICH> spoke with cardio NP and pt not on ASA/Plavix at this time due to ICH and they are aware of this, eventually they will d/w neurology/NS about when it will be safe to restart these meds. s/p ICU downgrade. Hospital course complicated by abdominal pain, N/V suspected acute brooks. Now s/p cholecystostomy by IR, on IV ab. Cr improving with IVF.    INTERVAL HPI/OVERNIGHT EVENTS:  Patient seen and examined sitting up in the chair with  at bedside.  Patient complaining of lower back pain.  Patient denies any headache, dizziness, SOB, CP abdominal pain, nausea, vomiting, dysuria.  Other ROS reviewed and are negative.    Vital Signs Last 24 Hrs  T(C): 36.9 (2018 07:33), Max: 37.1 (2018 15:53)  T(F): 98.4 (2018 07:33), Max: 98.8 (2018 15:53)  HR: 65 (2018 07:33) (64 - 73)  BP: 183/92 (2018 07:33) (164/72 - 185/90)  BP(mean): --  RR: 18 (2018 07:33) (18 - 18)  SpO2: 99% (2018 07:33) (97% - 99%)  I&O's Detail    2018 07:01  -  2018 07:00  --------------------------------------------------------  IN:    sodium chloride 0.9%: 150 mL  Total IN: 150 mL    OUT:    Drain: 375 mL  Total OUT: 375 mL    Total NET: -225 mL    PHYSICAL EXAM:  GENERAL: NAD, well-groomed, well-developed  HEAD:  Atraumatic, Normocephalic  NECK: Supple, No JVD, Normal thyroid  NERVOUS SYSTEM:  Alert & Oriented X3, Good concentration; left sided weakness  CHEST/LUNG: Clear to auscultation bilaterally; No rales, rhonchi, wheezing, or rubs  HEART: Regular rate and rhythm; No murmurs, rubs, or gallops  ABDOMEN: Soft, Nontender, Nondistended; Bowel sounds present; Bradford tube with bilious drainage  EXTREMITIES:  2+ Peripheral Pulses, No clubbing, cyanosis, or edema                                  11.9   9.6   )-----------( 389      ( 2018 06:15 )             34.6     2018 06:15    135    |  99     |  22.0   ----------------------------<  116    4.3     |  20.0   |  2.05     Ca    9.4        2018 06:15  Phos  2.7       2018 06:28    TPro  7.4    /  Alb  3.3    /  TBili  0.4    /  DBili  x      /  AST  37     /  ALT  29     /  AlkPhos  124    2018 06:15      CAPILLARY BLOOD GLUCOSE      POCT Blood Glucose.: 131 mg/dL (2018 07:37)  POCT Blood Glucose.: 85 mg/dL (2018 21:51)  POCT Blood Glucose.: 153 mg/dL (2018 18:16)  POCT Blood Glucose.: 189 mg/dL (2018 12:41)    LIVER FUNCTIONS - ( 2018 06:15 )  Alb: 3.3 g/dL / Pro: 7.4 g/dL / ALK PHOS: 124 U/L / ALT: 29 U/L / AST: 37 U/L / GGT: x           Urinalysis Basic - ( 2018 11:24 )    Color: Yellow / Appearance: Clear / S.010 / pH: x  Gluc: x / Ketone: Negative  / Bili: Negative / Urobili: Negative mg/dL   Blood: x / Protein: 30 mg/dL / Nitrite: Negative   Leuk Esterase: Negative / RBC: x / WBC x   Sq Epi: x / Non Sq Epi: x / Bacteria: x        MEDICATIONS  (STANDING):  amLODIPine   Tablet 10 milliGRAM(s) Oral daily  aspirin  chewable 81 milliGRAM(s) Oral daily  atorvastatin 40 milliGRAM(s) Oral at bedtime  carvedilol 25 milliGRAM(s) Oral every 12 hours  cloNIDine 0.1 milliGRAM(s) Oral two times a day  dextrose 5%. 1000 milliLiter(s) (50 mL/Hr) IV Continuous <Continuous>  dextrose 50% Injectable 12.5 Gram(s) IV Push once  dextrose 50% Injectable 25 Gram(s) IV Push once  dextrose 50% Injectable 25 Gram(s) IV Push once  enoxaparin Injectable 40 milliGRAM(s) SubCutaneous daily  furosemide    Tablet 20 milliGRAM(s) Oral daily  hydrALAZINE 50 milliGRAM(s) Oral three times a day  influenza   Vaccine 0.5 milliLiter(s) IntraMuscular once  insulin glargine Injectable (LANTUS) 14 Unit(s) SubCutaneous at bedtime  insulin lispro (HumaLOG) corrective regimen sliding scale   SubCutaneous three times a day before meals  insulin lispro (HumaLOG) corrective regimen sliding scale   SubCutaneous at bedtime  pantoprazole    Tablet 40 milliGRAM(s) Oral before breakfast    MEDICATIONS  (PRN):  aluminum hydroxide/magnesium hydroxide/simethicone Suspension 30 milliLiter(s) Oral every 4 hours PRN Dyspepsia  dextrose Gel 1 Dose(s) Oral once PRN Blood Glucose LESS THAN 70 milliGRAM(s)/deciliter  glucagon  Injectable 1 milliGRAM(s) IntraMuscular once PRN Glucose LESS THAN 70 milligrams/deciliter  hydrALAZINE Injectable 10 milliGRAM(s) IV Push every 4 hours PRN sbp>160  morphine  - Injectable 0.5 milliGRAM(s) IV Push every 4 hours PRN Moderate Pain (4 - 6)  ondansetron Injectable 4 milliGRAM(s) IV Push every 6 hours PRN Nausea and/or Vomiting  oxyCODONE    5 mG/acetaminophen 325 mG 1 Tablet(s) Oral every 4 hours PRN Moderate Pain (4 - 6)

## 2018-01-25 NOTE — PROGRESS NOTE ADULT - SUBJECTIVE AND OBJECTIVE BOX
Patient in chair.   Worked with therapy this AM.  States his pain has resolved. Feels his arm is better, but not his leg. He is having return of the arm strength but not of the leg.  Continues to have numbness.     FUNCTIONAL PROGRESS    Bed Mobility  Bed Mobility Training Supine-to-Sit: maximum assist (25% patient effort);  1 person assist;  verbal cues;  bed rails  Bed Mobility Training Limitations: decreased ability to use arms for pushing/pulling;  decreased ability to use legs for bridging/pushing;  impaired ability to control trunk for mobility;  decreased strength;  cognitive, decreased safety awareness    Bed-Chair Transfer Training  Transfer Training Bed-to-Chair Transfer: maximum assist (25% patient effort);  1 person assist;  verbal cues;  weight-bearing as tolerated   No AD    Sit-Stand Transfer Training  Transfer Training Sit-to-Stand Transfer: maximum assist (25% patient effort);  1 person assist;  verbal cues;  weight-bearing as tolerated  Transfer Training Stand-to-Sit Transfer: maximum assist (25% patient effort);  1 person assist;  verbal cues;  weight-bearing as tolerated  Sit-to-Stand Transfer Training Transfer Safety Analysis: decreased sequencing ability;  decreased weight-shifting ability;  decreased strength;  cognitive, decreased safety awareness  Eating/Self-Feeding Training  Eating/Self-Feeding Training Rehab Potential: fair, will monitor progress closely  Eating/Self-Feeding Training Assistance: supervsion;  set-up required;  supervision;  verbal cues;  decreased strength;  impaired coordination;  impaired balance    REVIEW OF SYSTEMS  Constitutional - No fever,  +fatigue  Neurological - +loss of strength, +numbness    VITALS  T(C): 36.9 (18 @ 07:33), Max: 37.1 (18 @ 15:53)  HR: 65 (18 @ 07:33) (64 - 73)  BP: 183/92 (18 @ 07:33) (164/72 - 185/90)  RR: 18 (18 @ 07:33) (18 - 18)  SpO2: 99% (18 @ 07:33) (97% - 99%)  Wt(kg): --    MEDICATIONS   aluminum hydroxide/magnesium hydroxide/simethicone Suspension 30 milliLiter(s) every 4 hours PRN  amLODIPine   Tablet 10 milliGRAM(s) daily  aspirin  chewable 81 milliGRAM(s) daily  atorvastatin 40 milliGRAM(s) at bedtime  carvedilol 25 milliGRAM(s) every 12 hours  cloNIDine 0.1 milliGRAM(s) two times a day  dextrose 5%. 1000 milliLiter(s) <Continuous>  dextrose 50% Injectable 12.5 Gram(s) once  dextrose 50% Injectable 25 Gram(s) once  dextrose 50% Injectable 25 Gram(s) once  dextrose Gel 1 Dose(s) once PRN  enoxaparin Injectable 40 milliGRAM(s) daily  furosemide    Tablet 20 milliGRAM(s) daily  glucagon  Injectable 1 milliGRAM(s) once PRN  hydrALAZINE 50 milliGRAM(s) three times a day  hydrALAZINE Injectable 10 milliGRAM(s) every 4 hours PRN  influenza   Vaccine 0.5 milliLiter(s) once  insulin glargine Injectable (LANTUS) 14 Unit(s) at bedtime  insulin lispro (HumaLOG) corrective regimen sliding scale   three times a day before meals  insulin lispro (HumaLOG) corrective regimen sliding scale   at bedtime  morphine  - Injectable 0.5 milliGRAM(s) every 4 hours PRN  ondansetron Injectable 4 milliGRAM(s) every 6 hours PRN  oxyCODONE    5 mG/acetaminophen 325 mG 1 Tablet(s) every 4 hours PRN  pantoprazole    Tablet 40 milliGRAM(s) before breakfast      RECENT LABS/IMAGING  CBC Full  -  ( 2018 06:15 )  WBC Count : 9.6 K/uL  Hemoglobin : 11.9 g/dL  Hematocrit : 34.6 %  Platelet Count - Automated : 389 K/uL  Mean Cell Volume : 83.2 fl  Mean Cell Hemoglobin : 28.6 pg  Mean Cell Hemoglobin Concentration : 34.4 g/dL  Auto Neutrophil # : x  Auto Lymphocyte # : x  Auto Monocyte # : x  Auto Eosinophil # : x  Auto Basophil # : x  Auto Neutrophil % : x  Auto Lymphocyte % : x  Auto Monocyte % : x  Auto Eosinophil % : x  Auto Basophil % : x        135  |  99  |  22.0<H>  ----------------------------<  116<H>  4.3   |  20.0<L>  |  2.05<H>    Ca    9.4      2018 06:15  Phos  2.7     01-24    TPro  7.4  /  Alb  3.3  /  TBili  0.4  /  DBili  x   /  AST  37  /  ALT  29  /  AlkPhos  124<H>      Urinalysis Basic - ( 2018 11:24 )    Color: Yellow / Appearance: Clear / S.010 / pH: x  Gluc: x / Ketone: Negative  / Bili: Negative / Urobili: Negative mg/dL   Blood: x / Protein: 30 mg/dL / Nitrite: Negative   Leuk Esterase: Negative / RBC: x / WBC 3-5   Sq Epi: x / Non Sq Epi: x / Bacteria: x      -----------------------------------------  PHYSICAL EXAM  Constitutional - NAD, Comfortable  Neurologic Exam -                    Cognitive - Awake, Alert, AAO to self, situation, H     Communication - +dysarthria     Cranial Nerves - loss left nasolabial fold     Motor                     LEFT    UE - ShAB 1/5, EF 1/5, EE 1/5, WE 1/5,  1/5                    LEFT    LE - HF 1/5, KE 1/5, DF 0/5, PF 0/5     Tone - 3/4 on EF and KE	     Sensory - decreased sensation left UE and LE      Psychiatric - Fatigued   ----------------------------------------------------------------------------------------  ASSESSMENT/PLAN  67M with right thalamic and corona radiata ICH with intraventricular extension likely hypertensive with left hemiparesis, dysphagia, functional, gait and ADL impairments   Acute cholecystitis - s/p IR drain   Pain -  Percocet, Recommend DC morphine  Dysphagia - MS/[place]   DVT PPX - SCDs, Lovenox  Mood/Motor Recovery - Recommend Prozac 10mg daily   Rehab -  Plan to have therapy see BID. Recommend ACUTE inpatient rehabilitation for the functional deficits consisting of 3 hours of therapy/day & 24 hour RN/daily PMR physician for comorbid medical management.     Continue bedside therapy as well as OOB throughout the day with mobilization throughout the day with staff to maintain cardiopulmonary function and prevention of secondary complications related to debility.

## 2018-01-26 ENCOUNTER — INPATIENT (INPATIENT)
Facility: HOSPITAL | Age: 68
LOS: 15 days | Discharge: ANOTHER HOSPITAL | DRG: 949 | End: 2018-02-11
Attending: STUDENT IN AN ORGANIZED HEALTH CARE EDUCATION/TRAINING PROGRAM | Admitting: STUDENT IN AN ORGANIZED HEALTH CARE EDUCATION/TRAINING PROGRAM
Payer: MEDICARE

## 2018-01-26 VITALS
HEART RATE: 68 BPM | DIASTOLIC BLOOD PRESSURE: 76 MMHG | TEMPERATURE: 98 F | SYSTOLIC BLOOD PRESSURE: 152 MMHG | OXYGEN SATURATION: 99 % | RESPIRATION RATE: 16 BRPM

## 2018-01-26 DIAGNOSIS — E11.9 TYPE 2 DIABETES MELLITUS WITHOUT COMPLICATIONS: ICD-10-CM

## 2018-01-26 DIAGNOSIS — L89.151 PRESSURE ULCER OF SACRAL REGION, STAGE 1: ICD-10-CM

## 2018-01-26 DIAGNOSIS — M25.551 PAIN IN RIGHT HIP: ICD-10-CM

## 2018-01-26 DIAGNOSIS — T24.201A BURN OF SECOND DEGREE OF UNSPECIFIED SITE OF RIGHT LOWER LIMB, EXCEPT ANKLE AND FOOT, INITIAL ENCOUNTER: ICD-10-CM

## 2018-01-26 DIAGNOSIS — E87.1 HYPO-OSMOLALITY AND HYPONATREMIA: ICD-10-CM

## 2018-01-26 DIAGNOSIS — R26.9 UNSPECIFIED ABNORMALITIES OF GAIT AND MOBILITY: ICD-10-CM

## 2018-01-26 DIAGNOSIS — R13.10 DYSPHAGIA, UNSPECIFIED: ICD-10-CM

## 2018-01-26 DIAGNOSIS — F32.9 MAJOR DEPRESSIVE DISORDER, SINGLE EPISODE, UNSPECIFIED: ICD-10-CM

## 2018-01-26 DIAGNOSIS — K81.0 ACUTE CHOLECYSTITIS: ICD-10-CM

## 2018-01-26 DIAGNOSIS — M79.651 PAIN IN RIGHT THIGH: ICD-10-CM

## 2018-01-26 DIAGNOSIS — I61.9 NONTRAUMATIC INTRACEREBRAL HEMORRHAGE, UNSPECIFIED: ICD-10-CM

## 2018-01-26 DIAGNOSIS — R60.9 EDEMA, UNSPECIFIED: ICD-10-CM
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
## 2018-01-26 DIAGNOSIS — M21.371 FOOT DROP, RIGHT FOOT: ICD-10-CM

## 2018-01-26 DIAGNOSIS — K59.00 CONSTIPATION, UNSPECIFIED: ICD-10-CM

## 2018-01-26 DIAGNOSIS — N17.9 ACUTE KIDNEY FAILURE, UNSPECIFIED: ICD-10-CM

## 2018-01-26 DIAGNOSIS — M54.5 LOW BACK PAIN: ICD-10-CM

## 2018-01-26 DIAGNOSIS — N18.9 CHRONIC KIDNEY DISEASE, UNSPECIFIED: ICD-10-CM

## 2018-01-26 DIAGNOSIS — Z51.89 ENCOUNTER FOR OTHER SPECIFIED AFTERCARE: ICD-10-CM

## 2018-01-26 DIAGNOSIS — I69.154 HEMIPLEGIA AND HEMIPARESIS FOLLOWING NONTRAUMATIC INTRACEREBRAL HEMORRHAGE AFFECTING LEFT NON-DOMINANT SIDE: ICD-10-CM

## 2018-01-26 DIAGNOSIS — I69.191 DYSPHAGIA FOLLOWING NONTRAUMATIC INTRACEREBRAL HEMORRHAGE: ICD-10-CM

## 2018-01-26 DIAGNOSIS — R20.2 PARESTHESIA OF SKIN: ICD-10-CM

## 2018-01-26 DIAGNOSIS — I25.10 ATHEROSCLEROTIC HEART DISEASE OF NATIVE CORONARY ARTERY WITHOUT ANGINA PECTORIS: ICD-10-CM

## 2018-01-26 DIAGNOSIS — I12.9 HYPERTENSIVE CHRONIC KIDNEY DISEASE WITH STAGE 1 THROUGH STAGE 4 CHRONIC KIDNEY DISEASE, OR UNSPECIFIED CHRONIC KIDNEY DISEASE: ICD-10-CM

## 2018-01-26 DIAGNOSIS — Z43.8 ENCOUNTER FOR ATTENTION TO OTHER ARTIFICIAL OPENINGS: ICD-10-CM
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
## 2018-01-26 LAB
ALBUMIN SERPL ELPH-MCNC: 3.6 G/DL — SIGNIFICANT CHANGE UP (ref 3.3–5.2)
ALP SERPL-CCNC: 130 U/L — HIGH (ref 40–120)
ALT FLD-CCNC: 34 U/L — SIGNIFICANT CHANGE UP
ANION GAP SERPL CALC-SCNC: 17 MMOL/L — SIGNIFICANT CHANGE UP (ref 5–17)
AST SERPL-CCNC: 37 U/L — SIGNIFICANT CHANGE UP
BILIRUB SERPL-MCNC: 0.4 MG/DL — SIGNIFICANT CHANGE UP (ref 0.4–2)
BUN SERPL-MCNC: 27 MG/DL — HIGH (ref 8–20)
CALCIUM SERPL-MCNC: 9.5 MG/DL — SIGNIFICANT CHANGE UP (ref 8.6–10.2)
CHLORIDE SERPL-SCNC: 100 MMOL/L — SIGNIFICANT CHANGE UP (ref 98–107)
CO2 SERPL-SCNC: 23 MMOL/L — SIGNIFICANT CHANGE UP (ref 22–29)
CREAT SERPL-MCNC: 2.05 MG/DL — HIGH (ref 0.5–1.3)
CULTURE RESULTS: NO GROWTH — SIGNIFICANT CHANGE UP
GLUCOSE BLDC GLUCOMTR-MCNC: 127 MG/DL — HIGH (ref 70–99)
GLUCOSE BLDC GLUCOMTR-MCNC: 146 MG/DL — HIGH (ref 70–99)
GLUCOSE BLDC GLUCOMTR-MCNC: 188 MG/DL — HIGH (ref 70–99)
GLUCOSE SERPL-MCNC: 123 MG/DL — HIGH (ref 70–115)
HCT VFR BLD CALC: 32.1 % — LOW (ref 42–52)
HGB BLD-MCNC: 11.1 G/DL — LOW (ref 14–18)
MCHC RBC-ENTMCNC: 28.6 PG — SIGNIFICANT CHANGE UP (ref 27–31)
MCHC RBC-ENTMCNC: 34.6 G/DL — SIGNIFICANT CHANGE UP (ref 32–36)
MCV RBC AUTO: 82.7 FL — SIGNIFICANT CHANGE UP (ref 80–94)
PLATELET # BLD AUTO: 379 K/UL — SIGNIFICANT CHANGE UP (ref 150–400)
POTASSIUM SERPL-MCNC: 4.1 MMOL/L — SIGNIFICANT CHANGE UP (ref 3.5–5.3)
POTASSIUM SERPL-SCNC: 4.1 MMOL/L — SIGNIFICANT CHANGE UP (ref 3.5–5.3)
PROT SERPL-MCNC: 7.2 G/DL — SIGNIFICANT CHANGE UP (ref 6.6–8.7)
RBC # BLD: 3.88 M/UL — LOW (ref 4.6–6.2)
RBC # FLD: 12.7 % — SIGNIFICANT CHANGE UP (ref 11–15.6)
SODIUM SERPL-SCNC: 140 MMOL/L — SIGNIFICANT CHANGE UP (ref 135–145)
SPECIMEN SOURCE: SIGNIFICANT CHANGE UP
WBC # BLD: 7.8 K/UL — SIGNIFICANT CHANGE UP (ref 4.8–10.8)
WBC # FLD AUTO: 7.8 K/UL — SIGNIFICANT CHANGE UP (ref 4.8–10.8)

## 2018-01-26 PROCEDURE — 83690 ASSAY OF LIPASE: CPT

## 2018-01-26 PROCEDURE — T1013: CPT

## 2018-01-26 PROCEDURE — 70450 CT HEAD/BRAIN W/O DYE: CPT

## 2018-01-26 PROCEDURE — 84443 ASSAY THYROID STIM HORMONE: CPT

## 2018-01-26 PROCEDURE — 93306 TTE W/DOPPLER COMPLETE: CPT

## 2018-01-26 PROCEDURE — 71275 CT ANGIOGRAPHY CHEST: CPT

## 2018-01-26 PROCEDURE — 97530 THERAPEUTIC ACTIVITIES: CPT

## 2018-01-26 PROCEDURE — C1894: CPT

## 2018-01-26 PROCEDURE — 99223 1ST HOSP IP/OBS HIGH 75: CPT

## 2018-01-26 PROCEDURE — 86850 RBC ANTIBODY SCREEN: CPT

## 2018-01-26 PROCEDURE — 80053 COMPREHEN METABOLIC PANEL: CPT

## 2018-01-26 PROCEDURE — 85027 COMPLETE CBC AUTOMATED: CPT

## 2018-01-26 PROCEDURE — 80048 BASIC METABOLIC PNL TOTAL CA: CPT

## 2018-01-26 PROCEDURE — 97110 THERAPEUTIC EXERCISES: CPT

## 2018-01-26 PROCEDURE — 70496 CT ANGIOGRAPHY HEAD: CPT

## 2018-01-26 PROCEDURE — 84244 ASSAY OF RENIN: CPT

## 2018-01-26 PROCEDURE — C1729: CPT

## 2018-01-26 PROCEDURE — 99285 EMERGENCY DEPT VISIT HI MDM: CPT | Mod: 25

## 2018-01-26 PROCEDURE — C1887: CPT

## 2018-01-26 PROCEDURE — 36415 COLL VENOUS BLD VENIPUNCTURE: CPT

## 2018-01-26 PROCEDURE — 97112 NEUROMUSCULAR REEDUCATION: CPT

## 2018-01-26 PROCEDURE — 82962 GLUCOSE BLOOD TEST: CPT

## 2018-01-26 PROCEDURE — 76775 US EXAM ABDO BACK WALL LIM: CPT

## 2018-01-26 PROCEDURE — 97116 GAIT TRAINING THERAPY: CPT

## 2018-01-26 PROCEDURE — 87086 URINE CULTURE/COLONY COUNT: CPT

## 2018-01-26 PROCEDURE — 87070 CULTURE OTHR SPECIMN AEROBIC: CPT

## 2018-01-26 PROCEDURE — C1769: CPT

## 2018-01-26 PROCEDURE — 97163 PT EVAL HIGH COMPLEX 45 MIN: CPT

## 2018-01-26 PROCEDURE — 84439 ASSAY OF FREE THYROXINE: CPT

## 2018-01-26 PROCEDURE — 82248 BILIRUBIN DIRECT: CPT

## 2018-01-26 PROCEDURE — 83036 HEMOGLOBIN GLYCOSYLATED A1C: CPT

## 2018-01-26 PROCEDURE — 72125 CT NECK SPINE W/O DYE: CPT

## 2018-01-26 PROCEDURE — 77012 CT SCAN FOR NEEDLE BIOPSY: CPT

## 2018-01-26 PROCEDURE — 83835 ASSAY OF METANEPHRINES: CPT

## 2018-01-26 PROCEDURE — 76770 US EXAM ABDO BACK WALL COMP: CPT

## 2018-01-26 PROCEDURE — 93458 L HRT ARTERY/VENTRICLE ANGIO: CPT

## 2018-01-26 PROCEDURE — 87075 CULTR BACTERIA EXCEPT BLOOD: CPT

## 2018-01-26 PROCEDURE — 86901 BLOOD TYPING SEROLOGIC RH(D): CPT

## 2018-01-26 PROCEDURE — 85045 AUTOMATED RETICULOCYTE COUNT: CPT

## 2018-01-26 PROCEDURE — 82533 TOTAL CORTISOL: CPT

## 2018-01-26 PROCEDURE — 74019 RADEX ABDOMEN 2 VIEWS: CPT

## 2018-01-26 PROCEDURE — 85379 FIBRIN DEGRADATION QUANT: CPT

## 2018-01-26 PROCEDURE — 84100 ASSAY OF PHOSPHORUS: CPT

## 2018-01-26 PROCEDURE — 76700 US EXAM ABDOM COMPLETE: CPT

## 2018-01-26 PROCEDURE — 81001 URINALYSIS AUTO W/SCOPE: CPT

## 2018-01-26 PROCEDURE — 82088 ASSAY OF ALDOSTERONE: CPT

## 2018-01-26 PROCEDURE — 93005 ELECTROCARDIOGRAM TRACING: CPT

## 2018-01-26 PROCEDURE — 84156 ASSAY OF PROTEIN URINE: CPT

## 2018-01-26 PROCEDURE — 82150 ASSAY OF AMYLASE: CPT

## 2018-01-26 PROCEDURE — 86900 BLOOD TYPING SEROLOGIC ABO: CPT

## 2018-01-26 PROCEDURE — 97167 OT EVAL HIGH COMPLEX 60 MIN: CPT

## 2018-01-26 PROCEDURE — 85610 PROTHROMBIN TIME: CPT

## 2018-01-26 PROCEDURE — 82570 ASSAY OF URINE CREATININE: CPT

## 2018-01-26 PROCEDURE — 87186 SC STD MICRODIL/AGAR DIL: CPT

## 2018-01-26 PROCEDURE — 97535 SELF CARE MNGMENT TRAINING: CPT

## 2018-01-26 PROCEDURE — 87205 SMEAR GRAM STAIN: CPT

## 2018-01-26 PROCEDURE — 76942 ECHO GUIDE FOR BIOPSY: CPT

## 2018-01-26 PROCEDURE — 83735 ASSAY OF MAGNESIUM: CPT

## 2018-01-26 PROCEDURE — 99232 SBSQ HOSP IP/OBS MODERATE 35: CPT

## 2018-01-26 PROCEDURE — 99152 MOD SED SAME PHYS/QHP 5/>YRS: CPT

## 2018-01-26 PROCEDURE — 80076 HEPATIC FUNCTION PANEL: CPT

## 2018-01-26 PROCEDURE — 82575 CREATININE CLEARANCE TEST: CPT

## 2018-01-26 PROCEDURE — 84484 ASSAY OF TROPONIN QUANT: CPT

## 2018-01-26 PROCEDURE — 87040 BLOOD CULTURE FOR BACTERIA: CPT

## 2018-01-26 PROCEDURE — 99239 HOSP IP/OBS DSCHRG MGMT >30: CPT

## 2018-01-26 PROCEDURE — 85730 THROMBOPLASTIN TIME PARTIAL: CPT

## 2018-01-26 RX ORDER — CARVEDILOL PHOSPHATE 80 MG/1
1 CAPSULE, EXTENDED RELEASE ORAL
Qty: 0 | Refills: 0 | DISCHARGE
Start: 2018-01-26

## 2018-01-26 RX ORDER — FUROSEMIDE 40 MG
1 TABLET ORAL
Qty: 0 | Refills: 0 | DISCHARGE
Start: 2018-01-26

## 2018-01-26 RX ORDER — AMLODIPINE BESYLATE 2.5 MG/1
1 TABLET ORAL
Qty: 0 | Refills: 0 | DISCHARGE
Start: 2018-01-26

## 2018-01-26 RX ORDER — ASPIRIN/CALCIUM CARB/MAGNESIUM 324 MG
1 TABLET ORAL
Qty: 0 | Refills: 0 | DISCHARGE
Start: 2018-01-26

## 2018-01-26 RX ORDER — HYDRALAZINE HCL 50 MG
1 TABLET ORAL
Qty: 0 | Refills: 0 | DISCHARGE
Start: 2018-01-26

## 2018-01-26 RX ORDER — INSULIN GLARGINE 100 [IU]/ML
14 INJECTION, SOLUTION SUBCUTANEOUS
Qty: 0 | Refills: 0 | DISCHARGE
Start: 2018-01-26

## 2018-01-26 RX ORDER — PANTOPRAZOLE SODIUM 20 MG/1
1 TABLET, DELAYED RELEASE ORAL
Qty: 0 | Refills: 0 | DISCHARGE
Start: 2018-01-26

## 2018-01-26 RX ORDER — ATORVASTATIN CALCIUM 80 MG/1
1 TABLET, FILM COATED ORAL
Qty: 0 | Refills: 0 | DISCHARGE
Start: 2018-01-26

## 2018-01-26 RX ADMIN — AMLODIPINE BESYLATE 10 MILLIGRAM(S): 2.5 TABLET ORAL at 06:12

## 2018-01-26 RX ADMIN — PANTOPRAZOLE SODIUM 40 MILLIGRAM(S): 20 TABLET, DELAYED RELEASE ORAL at 06:12

## 2018-01-26 RX ADMIN — Medication 81 MILLIGRAM(S): at 11:15

## 2018-01-26 RX ADMIN — CARVEDILOL PHOSPHATE 25 MILLIGRAM(S): 80 CAPSULE, EXTENDED RELEASE ORAL at 16:56

## 2018-01-26 RX ADMIN — INSULIN GLARGINE 14 UNIT(S): 100 INJECTION, SOLUTION SUBCUTANEOUS at 21:20

## 2018-01-26 RX ADMIN — ENOXAPARIN SODIUM 40 MILLIGRAM(S): 100 INJECTION SUBCUTANEOUS at 11:15

## 2018-01-26 RX ADMIN — Medication 20 MILLIGRAM(S): at 06:12

## 2018-01-26 RX ADMIN — CARVEDILOL PHOSPHATE 25 MILLIGRAM(S): 80 CAPSULE, EXTENDED RELEASE ORAL at 06:15

## 2018-01-26 RX ADMIN — Medication 50 MILLIGRAM(S): at 14:11

## 2018-01-26 RX ADMIN — Medication 0.1 MILLIGRAM(S): at 16:55

## 2018-01-26 RX ADMIN — Medication 1: at 11:14

## 2018-01-26 RX ADMIN — Medication 1: at 16:55

## 2018-01-26 RX ADMIN — Medication 0.1 MILLIGRAM(S): at 06:12

## 2018-01-26 RX ADMIN — Medication 50 MILLIGRAM(S): at 06:12

## 2018-01-26 RX ADMIN — ATORVASTATIN CALCIUM 40 MILLIGRAM(S): 80 TABLET, FILM COATED ORAL at 21:21

## 2018-01-26 RX ADMIN — Medication 50 MILLIGRAM(S): at 21:27

## 2018-01-26 NOTE — PROGRESS NOTE ADULT - SUBJECTIVE AND OBJECTIVE BOX
CC: stroke    HPI:  67 yom transferred from [place] after having left sided weakness found to have ICH likely hypertensive. Pt was initially managed in ICU until stable, stroke workup complete, neurology following.  Cardio decided to cath pt yesterday as part of workup and pt was NPO, post procedure he was found to be extremely hypertensive with HTN crisis and was brought to ICU BP mgt.  Pt was found to have a 70% midLAD lesion that will be medically managed for now due to fact that he is currently not candidate for ASA/Plavix due to ICH> spoke with cardio NP and pt not on ASA/Plavix at this time due to ICH and they are aware of this, eventually they will d/w neurology/NS about when it will be safe to restart these meds. s/p ICU downgrade. Hospital course complicated by abdominal pain, N/V suspected acute brooks. Now s/p cholecystostomy by IR, on IV ab. Cr improving with IVF.    INTERVAL HPI/OVERNIGHT EVENTS: Patient seen and examined sitting in the chair.  Patient denies any headache, dizziness, SOB, CP, abdominal pain, nausea, vomiting, dysuria.  Other ROS reviewed and are negative.    Vital Signs Last 24 Hrs  T(C): 36.6 (2018 08:09), Max: 36.8 (2018 15:19)  T(F): 97.9 (2018 08:09), Max: 98.3 (2018 06:09)  HR: 61 (2018 08:09) (57 - 69)  BP: 151/71 (2018 08:09) (146/68 - 175/88)  BP(mean): --  RR: 18 (2018 08:09) (18 - 18)  SpO2: 97% (2018 08:09) (97% - 97%)  I&O's Detail    2018 07:01  -  2018 07:00  --------------------------------------------------------  IN:  Total IN: 0 mL    OUT:    Drain: 300 mL  Total OUT: 300 mL    Total NET: -300 mL      PHYSICAL EXAM:  GENERAL: NAD, well-groomed, well-developed  HEAD:  Atraumatic, Normocephalic  NECK: Supple, No JVD, Normal thyroid  NERVOUS SYSTEM:  Alert & Oriented X3, Good concentration; left sided weakness  CHEST/LUNG: Clear to auscultation bilaterally; No rales, rhonchi, wheezing, or rubs  HEART: Regular rate and rhythm; No murmurs, rubs, or gallops  ABDOMEN: Soft, Nontender, Nondistended; Bowel sounds present; (+) brooks tube with bilious drainage  EXTREMITIES:  2+ Peripheral Pulses, No clubbing, cyanosis, or edema                              11.1   7.8   )-----------( 379      ( 2018 06:29 )             32.1     2018 06:29    140    |  100    |  27.0   ----------------------------<  123    4.1     |  23.0   |  2.05     Ca    9.5        2018 06:29    TPro  7.2    /  Alb  3.6    /  TBili  0.4    /  DBili  x      /  AST  37     /  ALT  34     /  AlkPhos  130    2018 06:29      CAPILLARY BLOOD GLUCOSE      POCT Blood Glucose.: 127 mg/dL (2018 08:13)  POCT Blood Glucose.: 225 mg/dL (2018 21:14)  POCT Blood Glucose.: 173 mg/dL (2018 17:25)    LIVER FUNCTIONS - ( 2018 06:29 )  Alb: 3.6 g/dL / Pro: 7.2 g/dL / ALK PHOS: 130 U/L / ALT: 34 U/L / AST: 37 U/L / GGT: x           Urinalysis Basic - ( 2018 11:24 )    Color: Yellow / Appearance: Clear / S.010 / pH: x  Gluc: x / Ketone: Negative  / Bili: Negative / Urobili: Negative mg/dL   Blood: x / Protein: 30 mg/dL / Nitrite: Negative   Leuk Esterase: Negative / RBC: x / WBC 3-5   Sq Epi: x / Non Sq Epi: x / Bacteria: x        MEDICATIONS  (STANDING):  amLODIPine   Tablet 10 milliGRAM(s) Oral daily  aspirin  chewable 81 milliGRAM(s) Oral daily  atorvastatin 40 milliGRAM(s) Oral at bedtime  carvedilol 25 milliGRAM(s) Oral every 12 hours  cloNIDine 0.1 milliGRAM(s) Oral two times a day  dextrose 5%. 1000 milliLiter(s) (50 mL/Hr) IV Continuous <Continuous>  dextrose 50% Injectable 12.5 Gram(s) IV Push once  dextrose 50% Injectable 25 Gram(s) IV Push once  dextrose 50% Injectable 25 Gram(s) IV Push once  enoxaparin Injectable 40 milliGRAM(s) SubCutaneous daily  furosemide    Tablet 20 milliGRAM(s) Oral daily  hydrALAZINE 50 milliGRAM(s) Oral three times a day  insulin glargine Injectable (LANTUS) 14 Unit(s) SubCutaneous at bedtime  insulin lispro (HumaLOG) corrective regimen sliding scale   SubCutaneous three times a day before meals  insulin lispro (HumaLOG) corrective regimen sliding scale   SubCutaneous at bedtime  pantoprazole    Tablet 40 milliGRAM(s) Oral before breakfast    MEDICATIONS  (PRN):  aluminum hydroxide/magnesium hydroxide/simethicone Suspension 30 milliLiter(s) Oral every 4 hours PRN Dyspepsia  dextrose Gel 1 Dose(s) Oral once PRN Blood Glucose LESS THAN 70 milliGRAM(s)/deciliter  glucagon  Injectable 1 milliGRAM(s) IntraMuscular once PRN Glucose LESS THAN 70 milligrams/deciliter  hydrALAZINE Injectable 10 milliGRAM(s) IV Push every 4 hours PRN sbp>160  ondansetron Injectable 4 milliGRAM(s) IV Push every 6 hours PRN Nausea and/or Vomiting CC: stroke    HPI:  67 yom transferred from [place] after having left sided weakness found to have ICH likely hypertensive. Pt was initially managed in ICU until stable, stroke workup complete, neurology following.  Cardio decided to cath pt yesterday as part of workup and pt was NPO, post procedure he was found to be extremely hypertensive with HTN crisis and was brought to ICU BP mgt.  Pt was found to have a 70% midLAD lesion that will be medically managed for now due to fact that he is currently not candidate for ASA/Plavix due to ICH> spoke with cardio NP and pt not on ASA/Plavix at this time due to ICH and they are aware of this, eventually they will d/w neurology/NS about when it will be safe to restart these meds. s/p ICU downgrade. Hospital course complicated by abdominal pain, N/V suspected acute brooks. Now s/p cholecystostomy by IR, on IV ab. Cr improving with IVF.    INTERVAL HPI/OVERNIGHT EVENTS: Patient seen and examined sitting in the chair with  at bedside.  Patient denies any headache, dizziness, SOB, CP, abdominal pain, nausea, vomiting, dysuria.  Other ROS reviewed and are negative.    Vital Signs Last 24 Hrs  T(C): 36.6 (2018 08:09), Max: 36.8 (2018 15:19)  T(F): 97.9 (2018 08:09), Max: 98.3 (2018 06:09)  HR: 61 (2018 08:09) (57 - 69)  BP: 151/71 (2018 08:09) (146/68 - 175/88)  BP(mean): --  RR: 18 (2018 08:09) (18 - 18)  SpO2: 97% (2018 08:09) (97% - 97%)  I&O's Detail    2018 07:01  -  2018 07:00  --------------------------------------------------------  IN:  Total IN: 0 mL    OUT:    Drain: 300 mL  Total OUT: 300 mL    Total NET: -300 mL      PHYSICAL EXAM:  GENERAL: NAD, well-groomed, well-developed  HEAD:  Atraumatic, Normocephalic  NECK: Supple, No JVD, Normal thyroid  NERVOUS SYSTEM:  Alert & Oriented X3, Good concentration; left sided weakness  CHEST/LUNG: Clear to auscultation bilaterally; No rales, rhonchi, wheezing, or rubs  HEART: Regular rate and rhythm; No murmurs, rubs, or gallops  ABDOMEN: Soft, Nontender, Nondistended; Bowel sounds present; (+) brooks tube with bilious drainage  EXTREMITIES:  2+ Peripheral Pulses, No clubbing, cyanosis, or edema                              11.1   7.8   )-----------( 379      ( 2018 06:29 )             32.1     2018 06:29    140    |  100    |  27.0   ----------------------------<  123    4.1     |  23.0   |  2.05     Ca    9.5        2018 06:29    TPro  7.2    /  Alb  3.6    /  TBili  0.4    /  DBili  x      /  AST  37     /  ALT  34     /  AlkPhos  130    2018 06:29      CAPILLARY BLOOD GLUCOSE      POCT Blood Glucose.: 127 mg/dL (2018 08:13)  POCT Blood Glucose.: 225 mg/dL (2018 21:14)  POCT Blood Glucose.: 173 mg/dL (2018 17:25)    LIVER FUNCTIONS - ( 2018 06:29 )  Alb: 3.6 g/dL / Pro: 7.2 g/dL / ALK PHOS: 130 U/L / ALT: 34 U/L / AST: 37 U/L / GGT: x           Urinalysis Basic - ( 2018 11:24 )    Color: Yellow / Appearance: Clear / S.010 / pH: x  Gluc: x / Ketone: Negative  / Bili: Negative / Urobili: Negative mg/dL   Blood: x / Protein: 30 mg/dL / Nitrite: Negative   Leuk Esterase: Negative / RBC: x / WBC 3-5   Sq Epi: x / Non Sq Epi: x / Bacteria: x        MEDICATIONS  (STANDING):  amLODIPine   Tablet 10 milliGRAM(s) Oral daily  aspirin  chewable 81 milliGRAM(s) Oral daily  atorvastatin 40 milliGRAM(s) Oral at bedtime  carvedilol 25 milliGRAM(s) Oral every 12 hours  cloNIDine 0.1 milliGRAM(s) Oral two times a day  dextrose 5%. 1000 milliLiter(s) (50 mL/Hr) IV Continuous <Continuous>  dextrose 50% Injectable 12.5 Gram(s) IV Push once  dextrose 50% Injectable 25 Gram(s) IV Push once  dextrose 50% Injectable 25 Gram(s) IV Push once  enoxaparin Injectable 40 milliGRAM(s) SubCutaneous daily  furosemide    Tablet 20 milliGRAM(s) Oral daily  hydrALAZINE 50 milliGRAM(s) Oral three times a day  insulin glargine Injectable (LANTUS) 14 Unit(s) SubCutaneous at bedtime  insulin lispro (HumaLOG) corrective regimen sliding scale   SubCutaneous three times a day before meals  insulin lispro (HumaLOG) corrective regimen sliding scale   SubCutaneous at bedtime  pantoprazole    Tablet 40 milliGRAM(s) Oral before breakfast    MEDICATIONS  (PRN):  aluminum hydroxide/magnesium hydroxide/simethicone Suspension 30 milliLiter(s) Oral every 4 hours PRN Dyspepsia  dextrose Gel 1 Dose(s) Oral once PRN Blood Glucose LESS THAN 70 milliGRAM(s)/deciliter  glucagon  Injectable 1 milliGRAM(s) IntraMuscular once PRN Glucose LESS THAN 70 milligrams/deciliter  hydrALAZINE Injectable 10 milliGRAM(s) IV Push every 4 hours PRN sbp>160  ondansetron Injectable 4 milliGRAM(s) IV Push every 6 hours PRN Nausea and/or Vomiting

## 2018-01-26 NOTE — PROGRESS NOTE ADULT - PROBLEM SELECTOR PROBLEM 2
ALIVIA (acute kidney injury)
Hypertensive crisis
ALIVIA (acute kidney injury)
Hypertensive crisis
ALIVIA (acute kidney injury)
Essential hypertension
Hypertensive crisis
Nontraumatic subcortical hemorrhage of right cerebral hemisphere
ALIVIA (acute kidney injury)
Hypertensive crisis
Nontraumatic subcortical hemorrhage of right cerebral hemisphere
ALIVIA (acute kidney injury)
Nontraumatic subcortical hemorrhage of right cerebral hemisphere
Nontraumatic subcortical hemorrhage of right cerebral hemisphere

## 2018-01-26 NOTE — PROGRESS NOTE ADULT - PROBLEM SELECTOR PROBLEM 1
Cardiomyopathy, unspecified type
Nontraumatic subcortical hemorrhage of right cerebral hemisphere
Acute cholecystitis due to biliary calculus
Nontraumatic subcortical hemorrhage of right cerebral hemisphere
Nontraumatic subcortical hemorrhage of right cerebral hemisphere
Acute cholecystitis due to biliary calculus
Nontraumatic subcortical hemorrhage of right cerebral hemisphere
Acute cholecystitis due to biliary calculus

## 2018-01-26 NOTE — PROGRESS NOTE ADULT - PROBLEM SELECTOR PROBLEM 6
ALIVIA (acute kidney injury)
Cardiomyopathy, unspecified type
ALIVIA (acute kidney injury)
Dysuria
ALIVIA (acute kidney injury)
ALIVIA (acute kidney injury)

## 2018-01-26 NOTE — PROGRESS NOTE ADULT - PROBLEM SELECTOR PROBLEM 5
Coronary artery disease involving native coronary artery of native heart with angina pectoris
ALIVIA (acute kidney injury)
Coronary artery disease involving native coronary artery of native heart with angina pectoris
Essential hypertension
Coronary artery disease involving native coronary artery of native heart with angina pectoris
Essential hypertension
Coronary artery disease involving native coronary artery of native heart with angina pectoris

## 2018-01-26 NOTE — PROGRESS NOTE ADULT - SUBJECTIVE AND OBJECTIVE BOX
Patient seen and examined    Feels Ok ; NS change  More interactive  no c/o CP SOB NV   No swelling feet    Vital Signs Last 24 Hrs  T(C): 36.4 (26 Jan 2018 15:45), Max: 36.8 (26 Jan 2018 06:09)  T(F): 97.5 (26 Jan 2018 15:45), Max: 98.3 (26 Jan 2018 06:09)  HR: 61 (26 Jan 2018 15:45) (57 - 68)  BP: 165/70 (26 Jan 2018 15:45) (146/68 - 165/70)  BP(mean): --  RR: 18 (26 Jan 2018 15:45) (18 - 18)  SpO2: 99% (26 Jan 2018 15:45) (97% - 99%)    PHYSICAL EXAM    GENERAL: NAD,   EYES:  conjunctiva and sclera clear  NECK: Supple, No JVD/Bruit  NERVOUS SYSTEM:  A/O x3, L side -power 0-1/5; R side 4/5   CHEST:  No rales, No rhonchi  HEART:  RRR, No murmur  ABDOMEN: Soft, NT/ND BS+  EXTREMITIES:  tr Edema;  SKIN: No rashes    26 Jan 2018 06:29    140    |  100    |  27.0   ----------------------------<  123    4.1     |  23.0   |  2.05     Ca    9.5        26 Jan 2018 06:29    TPro  7.2    /  Alb  3.6    /  TBili  0.4    /  DBili  x      /  AST  37     /  ALT  34     /  AlkPhos  130    26 Jan 2018 06:29                          11.1   7.8   )-----------( 379      ( 26 Jan 2018 06:29 )             32.1       Creat stable at 2; Lytes Hb normal  Continue present treatment

## 2018-01-26 NOTE — PROGRESS NOTE ADULT - PROBLEM SELECTOR PLAN 6
Improved.  UA negative; Urine culture testing.
UA and Urine culture pending.
Cr in Jan 11th was wnl  Likely from Hypertensive crisis & contrast from cath (BRIANNA)  Cr worsened  Add gentle IVF  Hold ACE & aldactone for now  Work up per renal, check urine studies
Cr in Jan 11th was wnl  Likely from Hypertensive crisis & contrast from cath (BRIANNA)  Cr worsened  Add gentle IVF  Hold ACE & aldactone for now  Work up per renal, check urine studies
Improved.  UA negative; Urine culture testing.
Cardiomyopathy is likely a mixture of ischemic and non ischemic (HTN) cardiomyopathy  continue enalapril can increase the dose to 20 mg twice daily   Switch labetalol to carvedilol 25 mg twice daily   continue spirinolactone  Consider holding ACE & aldactone for now if no improvement in ALIVIA
Cr in Jan 11th was wnl  Likely from Hypertensive crisis & contrast from cath (BRIANNA)  Cr improving  Add gentle IVF  Hold ACE & aldactone for now  Work up per renal, check urine studies
Cr in Jan 11th was wnl  Likely from Hypertensive crisis & contrast from cath (BRIANNA)  Cr improving  Add gentle IVF  Hold ACE & aldactone for now  Work up per renal, check urine studies

## 2018-01-26 NOTE — PROGRESS NOTE ADULT - PROBLEM SELECTOR PLAN 1
S/p cholecystostomy drain by IR - to stay in place for 4-6 weeks (until 3/3/18), flush tubes daily to maintain patency  Completed Zosyn  Blood cultures negative to date  Drain cultures with moderate Enterococcus faecium

## 2018-01-26 NOTE — PROGRESS NOTE ADULT - SUBJECTIVE AND OBJECTIVE BOX
Patient is uncomfortable sitting in chair.   Made staff aware to place back in bed.   Plan for AR today.   No acute events overnight.     FUNCTIONAL PROGRESS    Bed Mobility  Bed Mobility Training Supine-to-Sit: moderate assist (50% patient effort);  1 person assist;  verbal cues;  bed rails  Bed Mobility Training Limitations: decreased ability to use arms for pushing/pulling;  decreased ability to use legs for bridging/pushing;  decreased strength;  cognitive, decreased safety awareness    Bed-Chair Transfer Training  Transfer Training Bed-to-Chair Transfer: moderate assist (50% patient effort);  1 person assist;  verbal cues;  weight-bearing as tolerated   SPT    Sit-Stand Transfer Training  Transfer Training Sit-to-Stand Transfer: moderate assist (50% patient effort);  1 person assist;  verbal cues;  full weight-bearing  Transfer Training Stand-to-Sit Transfer: moderate assist (50% patient effort);  1 person assist;  verbal cues;  full weight-bearing  Sit-to-Stand Transfer Training Transfer Safety Analysis: decreased sequencing ability;  decreased strength;  cognitive, decreased safety awareness    Gait Training  Gait Training: maximum assist (25% patient effort);  1 person assist;  verbal cues;  weight-bearing as tolerated   Hallway rail;  5 feet;  10 feet  Gait Analysis: swing-to gait   shuffling;  crouch;  increased time in double stance;  decreased strength;  impaired coordination;  5 feet;  10 feet;  hallway rail    REVIEW OF SYSTEMS  Constitutional - No fever,  No fatigue  HEENT - No vertigo, No neck pain  Neurological - No headaches, No memory loss, No loss of strength, No numbness, No tremors  Skin - No rashes, No lesions   Musculoskeletal - No joint pain, No joint swelling, No muscle pain  Psychiatric - No depression, No anxiety    VITALS  T(C): 36.6 (18 @ 08:09), Max: 36.8 (18 @ 15:19)  HR: 61 (18 @ 08:09) (57 - 69)  BP: 151/71 (18 @ 08:09) (146/68 - 175/88)  RR: 18 (18 @ 08:09) (18 - 18)  SpO2: 97% (18 @ 08:09) (97% - 97%)  Wt(kg): --    MEDICATIONS   aluminum hydroxide/magnesium hydroxide/simethicone Suspension 30 milliLiter(s) every 4 hours PRN  amLODIPine   Tablet 10 milliGRAM(s) daily  aspirin  chewable 81 milliGRAM(s) daily  atorvastatin 40 milliGRAM(s) at bedtime  carvedilol 25 milliGRAM(s) every 12 hours  cloNIDine 0.1 milliGRAM(s) two times a day  dextrose 5%. 1000 milliLiter(s) <Continuous>  dextrose 50% Injectable 12.5 Gram(s) once  dextrose 50% Injectable 25 Gram(s) once  dextrose 50% Injectable 25 Gram(s) once  dextrose Gel 1 Dose(s) once PRN  enoxaparin Injectable 40 milliGRAM(s) daily  furosemide    Tablet 20 milliGRAM(s) daily  glucagon  Injectable 1 milliGRAM(s) once PRN  hydrALAZINE 50 milliGRAM(s) three times a day  hydrALAZINE Injectable 10 milliGRAM(s) every 4 hours PRN  insulin glargine Injectable (LANTUS) 14 Unit(s) at bedtime  insulin lispro (HumaLOG) corrective regimen sliding scale   three times a day before meals  insulin lispro (HumaLOG) corrective regimen sliding scale   at bedtime  ondansetron Injectable 4 milliGRAM(s) every 6 hours PRN  pantoprazole    Tablet 40 milliGRAM(s) before breakfast      RECENT LABS/IMAGING  CBC Full  -  ( 2018 06:29 )  WBC Count : 7.8 K/uL  Hemoglobin : 11.1 g/dL  Hematocrit : 32.1 %  Platelet Count - Automated : 379 K/uL  Mean Cell Volume : 82.7 fl  Mean Cell Hemoglobin : 28.6 pg  Mean Cell Hemoglobin Concentration : 34.6 g/dL  Auto Neutrophil # : x  Auto Lymphocyte # : x  Auto Monocyte # : x  Auto Eosinophil # : x  Auto Basophil # : x  Auto Neutrophil % : x  Auto Lymphocyte % : x  Auto Monocyte % : x  Auto Eosinophil % : x  Auto Basophil % : x        140  |  100  |  27.0<H>  ----------------------------<  123<H>  4.1   |  23.0  |  2.05<H>    Ca    9.5      2018 06:29    TPro  7.2  /  Alb  3.6  /  TBili  0.4  /  DBili  x   /  AST  37  /  ALT  34  /  AlkPhos  130<H>      Urinalysis Basic - ( 2018 11:24 )    Color: Yellow / Appearance: Clear / S.010 / pH: x  Gluc: x / Ketone: Negative  / Bili: Negative / Urobili: Negative mg/dL   Blood: x / Protein: 30 mg/dL / Nitrite: Negative   Leuk Esterase: Negative / RBC: x / WBC 3-5   Sq Epi: x / Non Sq Epi: x / Bacteria: x      -----------------------------------------  PHYSICAL EXAM  Constitutional - NAD, Comfortable  Neurologic Exam -                    Cognitive - Awake, Alert, AAO to self, situation, H     Communication - +dysarthria     Cranial Nerves - loss left nasolabial fold     Motor                     LEFT    UE - ShAB 1/5, EF 1/5, EE 1/5, WE 1/5,  1/5                    LEFT    LE - HF 1/5, KE 1/5, DF 0/5, PF 0/5     Tone - 3/4 on EF and KE	     Sensory - decreased sensation left UE and LE      Psychiatric - Stable  ----------------------------------------------------------------------------------------  ASSESSMENT/PLAN  67M with right thalamic and corona radiata ICH with intraventricular extension likely hypertensive with left hemiparesis, dysphagia, functional, gait and ADL impairments   Acute cholecystitis - s/p IR drain   Pain -  Percocet, Recommend DC morphine  Dysphagia - MS/Hoyt   DVT PPX - SCDs, Lovenox  Mood/Motor Recovery - Recommend Prozac 10mg daily   Rehab -  Plan to have therapy see BID. Recommend ACUTE inpatient rehabilitation for the functional deficits consisting of 3 hours of therapy/day & 24 hour RN/daily PMR physician for comorbid medical management.     Continue bedside therapy as well as OOB throughout the day with mobilization throughout the day with staff to maintain cardiopulmonary function and prevention of secondary complications related to debility.

## 2018-01-26 NOTE — PROGRESS NOTE ADULT - PROVIDER SPECIALTY LIST ADULT
Cardiology
Cardiology
Hospitalist
Intervent Radiology
Nephrology
Neurology
Neurosurgery
Surgery
Cardiology
Hospitalist
Nephrology
Rehab Medicine
Surgery
Cardiology
Nephrology
Rehab Medicine
Nephrology
Rehab Medicine
Critical Care
Intervent Cardiology
Critical Care
Critical Care
Hospitalist

## 2018-01-27 PROCEDURE — 99223 1ST HOSP IP/OBS HIGH 75: CPT | Mod: AI,GC

## 2018-01-27 PROCEDURE — 99223 1ST HOSP IP/OBS HIGH 75: CPT

## 2018-01-28 PROCEDURE — 99232 SBSQ HOSP IP/OBS MODERATE 35: CPT

## 2018-01-29 PROCEDURE — 96116 NUBHVL XM PHYS/QHP 1ST HR: CPT

## 2018-01-29 PROCEDURE — 99232 SBSQ HOSP IP/OBS MODERATE 35: CPT

## 2018-01-30 PROCEDURE — 99232 SBSQ HOSP IP/OBS MODERATE 35: CPT

## 2018-01-30 PROCEDURE — 99233 SBSQ HOSP IP/OBS HIGH 50: CPT

## 2018-01-31 PROCEDURE — 99232 SBSQ HOSP IP/OBS MODERATE 35: CPT

## 2018-02-01 PROCEDURE — 99232 SBSQ HOSP IP/OBS MODERATE 35: CPT

## 2018-02-01 PROCEDURE — 99233 SBSQ HOSP IP/OBS HIGH 50: CPT

## 2018-02-02 PROCEDURE — 99233 SBSQ HOSP IP/OBS HIGH 50: CPT

## 2018-02-02 PROCEDURE — 99232 SBSQ HOSP IP/OBS MODERATE 35: CPT

## 2018-02-03 PROCEDURE — 71045 X-RAY EXAM CHEST 1 VIEW: CPT | Mod: 26

## 2018-02-03 PROCEDURE — 99232 SBSQ HOSP IP/OBS MODERATE 35: CPT

## 2018-02-03 PROCEDURE — 73502 X-RAY EXAM HIP UNI 2-3 VIEWS: CPT | Mod: 26,RT

## 2018-02-03 PROCEDURE — 73552 X-RAY EXAM OF FEMUR 2/>: CPT | Mod: 26,RT

## 2018-02-04 PROCEDURE — 99232 SBSQ HOSP IP/OBS MODERATE 35: CPT

## 2018-02-05 PROCEDURE — 99232 SBSQ HOSP IP/OBS MODERATE 35: CPT

## 2018-02-06 PROCEDURE — 99232 SBSQ HOSP IP/OBS MODERATE 35: CPT

## 2018-02-06 PROCEDURE — 90832 PSYTX W PT 30 MINUTES: CPT

## 2018-02-07 PROCEDURE — 70450 CT HEAD/BRAIN W/O DYE: CPT | Mod: 26

## 2018-02-07 PROCEDURE — 99233 SBSQ HOSP IP/OBS HIGH 50: CPT

## 2018-02-08 PROCEDURE — 99232 SBSQ HOSP IP/OBS MODERATE 35: CPT

## 2018-02-08 PROCEDURE — 71045 X-RAY EXAM CHEST 1 VIEW: CPT | Mod: 26

## 2018-02-09 PROCEDURE — 90832 PSYTX W PT 30 MINUTES: CPT

## 2018-02-10 PROCEDURE — 99232 SBSQ HOSP IP/OBS MODERATE 35: CPT

## 2018-02-11 ENCOUNTER — INPATIENT (INPATIENT)
Facility: HOSPITAL | Age: 68
LOS: 1 days | Discharge: HOME | DRG: 934 | End: 2018-02-13
Attending: INTERNAL MEDICINE | Admitting: INTERNAL MEDICINE
Payer: MEDICARE

## 2018-02-11 DIAGNOSIS — I69.354 HEMIPLEGIA AND HEMIPARESIS FOLLOWING CEREBRAL INFARCTION AFFECTING LEFT NON-DOMINANT SIDE: ICD-10-CM

## 2018-02-11 DIAGNOSIS — I75.81: ICD-10-CM

## 2018-02-11 DIAGNOSIS — T24.331A BURN OF THIRD DEGREE OF RIGHT LOWER LEG, INITIAL ENCOUNTER: ICD-10-CM

## 2018-02-11 DIAGNOSIS — I70.245 ATHEROSCLEROSIS OF NATIVE ARTERIES OF LEFT LEG WITH ULCERATION OF OTHER PART OF FOOT: ICD-10-CM

## 2018-02-11 DIAGNOSIS — I50.9 HEART FAILURE, UNSPECIFIED: ICD-10-CM
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
## 2018-02-11 DIAGNOSIS — M21.371 FOOT DROP, RIGHT FOOT: ICD-10-CM

## 2018-02-11 DIAGNOSIS — I13.0 HYPERTENSIVE HEART AND CHRONIC KIDNEY DISEASE WITH HEART FAILURE AND STAGE 1 THROUGH STAGE 4 CHRONIC KIDNEY DISEASE, OR UNSPECIFIED CHRONIC KIDNEY DISEASE: ICD-10-CM

## 2018-02-11 DIAGNOSIS — E11.9 TYPE 2 DIABETES MELLITUS WITHOUT COMPLICATIONS: ICD-10-CM
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
## 2018-02-11 DIAGNOSIS — T31.0 BURNS INVOLVING LESS THAN 10% OF BODY SURFACE: ICD-10-CM

## 2018-02-11 DIAGNOSIS — N18.9 CHRONIC KIDNEY DISEASE, UNSPECIFIED: ICD-10-CM

## 2018-02-11 DIAGNOSIS — E78.5 HYPERLIPIDEMIA, UNSPECIFIED: ICD-10-CM

## 2018-02-11 PROCEDURE — 70450 CT HEAD/BRAIN W/O DYE: CPT | Mod: 26

## 2018-02-11 PROCEDURE — 93970 EXTREMITY STUDY: CPT | Mod: 26

## 2018-02-12 PROCEDURE — 99233 SBSQ HOSP IP/OBS HIGH 50: CPT

## 2018-02-12 PROCEDURE — 85025 COMPLETE CBC W/AUTO DIFF WBC: CPT

## 2018-02-12 PROCEDURE — 81003 URINALYSIS AUTO W/O SCOPE: CPT

## 2018-02-12 PROCEDURE — 73552 X-RAY EXAM OF FEMUR 2/>: CPT

## 2018-02-12 PROCEDURE — 84300 ASSAY OF URINE SODIUM: CPT

## 2018-02-12 PROCEDURE — 84439 ASSAY OF FREE THYROXINE: CPT

## 2018-02-12 PROCEDURE — 97112 NEUROMUSCULAR REEDUCATION: CPT

## 2018-02-12 PROCEDURE — 92523 SPEECH SOUND LANG COMPREHEN: CPT

## 2018-02-12 PROCEDURE — 84100 ASSAY OF PHOSPHORUS: CPT

## 2018-02-12 PROCEDURE — 86038 ANTINUCLEAR ANTIBODIES: CPT

## 2018-02-12 PROCEDURE — 70450 CT HEAD/BRAIN W/O DYE: CPT

## 2018-02-12 PROCEDURE — 80069 RENAL FUNCTION PANEL: CPT

## 2018-02-12 PROCEDURE — 92610 EVALUATE SWALLOWING FUNCTION: CPT

## 2018-02-12 PROCEDURE — 82570 ASSAY OF URINE CREATININE: CPT

## 2018-02-12 PROCEDURE — 85027 COMPLETE CBC AUTOMATED: CPT

## 2018-02-12 PROCEDURE — 83735 ASSAY OF MAGNESIUM: CPT

## 2018-02-12 PROCEDURE — 82550 ASSAY OF CK (CPK): CPT

## 2018-02-12 PROCEDURE — 82436 ASSAY OF URINE CHLORIDE: CPT

## 2018-02-12 PROCEDURE — 84443 ASSAY THYROID STIM HORMONE: CPT

## 2018-02-12 PROCEDURE — 73502 X-RAY EXAM HIP UNI 2-3 VIEWS: CPT

## 2018-02-12 PROCEDURE — 97110 THERAPEUTIC EXERCISES: CPT

## 2018-02-12 PROCEDURE — 97535 SELF CARE MNGMENT TRAINING: CPT

## 2018-02-12 PROCEDURE — 80048 BASIC METABOLIC PNL TOTAL CA: CPT

## 2018-02-12 PROCEDURE — 93970 EXTREMITY STUDY: CPT

## 2018-02-12 PROCEDURE — 97163 PT EVAL HIGH COMPLEX 45 MIN: CPT

## 2018-02-12 PROCEDURE — 80061 LIPID PANEL: CPT

## 2018-02-12 PROCEDURE — 83516 IMMUNOASSAY NONANTIBODY: CPT

## 2018-02-12 PROCEDURE — 71045 X-RAY EXAM CHEST 1 VIEW: CPT

## 2018-02-12 PROCEDURE — 97530 THERAPEUTIC ACTIVITIES: CPT

## 2018-02-12 PROCEDURE — 84165 PROTEIN E-PHORESIS SERUM: CPT

## 2018-02-12 PROCEDURE — 80076 HEPATIC FUNCTION PANEL: CPT

## 2018-02-12 PROCEDURE — 97116 GAIT TRAINING THERAPY: CPT

## 2018-02-12 PROCEDURE — 83615 LACTATE (LD) (LDH) ENZYME: CPT

## 2018-02-12 PROCEDURE — 92507 TX SP LANG VOICE COMM INDIV: CPT

## 2018-02-12 PROCEDURE — 73700 CT LOWER EXTREMITY W/O DYE: CPT | Mod: 26,RT

## 2018-02-12 PROCEDURE — 84480 ASSAY TRIIODOTHYRONINE (T3): CPT

## 2018-02-12 PROCEDURE — 82272 OCCULT BLD FECES 1-3 TESTS: CPT

## 2018-02-12 PROCEDURE — 72131 CT LUMBAR SPINE W/O DYE: CPT | Mod: 26

## 2018-02-12 PROCEDURE — 97167 OT EVAL HIGH COMPLEX 60 MIN: CPT

## 2018-02-12 PROCEDURE — 86060 ANTISTREPTOLYSIN O TITER: CPT

## 2018-02-12 PROCEDURE — 86036 ANCA SCREEN EACH ANTIBODY: CPT

## 2018-02-12 PROCEDURE — 84550 ASSAY OF BLOOD/URIC ACID: CPT

## 2018-02-12 PROCEDURE — 80053 COMPREHEN METABOLIC PANEL: CPT

## 2018-02-12 PROCEDURE — 86160 COMPLEMENT ANTIGEN: CPT

## 2018-02-13 PROCEDURE — 80069 RENAL FUNCTION PANEL: CPT

## 2018-02-13 PROCEDURE — 82550 ASSAY OF CK (CPK): CPT

## 2018-02-13 PROCEDURE — 83935 ASSAY OF URINE OSMOLALITY: CPT

## 2018-02-13 PROCEDURE — 82436 ASSAY OF URINE CHLORIDE: CPT

## 2018-02-13 PROCEDURE — 80076 HEPATIC FUNCTION PANEL: CPT

## 2018-02-13 PROCEDURE — 81003 URINALYSIS AUTO W/O SCOPE: CPT

## 2018-02-13 PROCEDURE — 84300 ASSAY OF URINE SODIUM: CPT

## 2018-02-13 PROCEDURE — 83735 ASSAY OF MAGNESIUM: CPT

## 2018-02-13 PROCEDURE — 72131 CT LUMBAR SPINE W/O DYE: CPT

## 2018-02-13 PROCEDURE — 73700 CT LOWER EXTREMITY W/O DYE: CPT

## 2018-02-13 PROCEDURE — 85027 COMPLETE CBC AUTOMATED: CPT

## 2020-04-02 NOTE — ED PROVIDER NOTE - NEUROLOGICAL SPEECH
clear Erythromycin Pregnancy And Lactation Text: This medication is Pregnancy Category B and is considered safe during pregnancy. It is also excreted in breast milk.

## 2020-08-31 NOTE — ED ADULT NURSE NOTE - CAS TRG GEN SKIN COLOR
Eosinophilia  Erosive gastritis  PUD (peptic ulcer disease)  Eosinophilic esophagitis  Abdominal pain  Skin Rash    IVFs  Protoprozole Infusion   Monitor CBS and transfuse for < 7  Keep NPO- CLD   GI Consult- EGD plan for 8/31- no stigmata of bleeding, reported GES - fu as outpatient   Continue home prednisone   Skin rash related to above diagnosis, benadryl prn  Prn pain medication   Normal for race

## 2020-12-26 NOTE — PHYSICAL THERAPY INITIAL EVALUATION ADULT - TRANSFER SAFETY CONCERNS NOTED: SIT/STAND, REHAB EVAL
difficulty weight acceptance to the left LE/decreased balance during turns/decreased safety awareness/losing balance/decreased proprioception/decreased sequencing ability/decreased weight-shifting ability
decreased safety awareness/losing balance/decreased weight-shifting ability
You can access the FollowMyHealth Patient Portal offered by [facility] by registering at the following website: [email or web address]. By joining [employer]’s FollowMyHealth portal, you will also be able to view your health information using other applications (apps) compatible with our system.
decreased safety awareness/losing balance/decreased sequencing ability/decreased weight-shifting ability

## 2021-02-26 NOTE — PHYSICAL THERAPY INITIAL EVALUATION ADULT - ASR EQUIP NEEDS DISCH PT EVAL
rolling walker (5 inch wheels)
ambulation device to be determine
Rotation Flap Text: The defect edges were debeveled with a #15 scalpel blade.  Given the location of the defect, shape of the defect and the proximity to free margins a rotation flap was deemed most appropriate.  Using a sterile surgical marker, an appropriate rotation flap was drawn incorporating the defect and placing the expected incisions within the relaxed skin tension lines where possible.    The area thus outlined was incised deep to adipose tissue with a #15 scalpel blade.  The skin margins were undermined to an appropriate distance in all directions utilizing iris scissors.

## 2021-04-02 NOTE — DISCHARGE NOTE ADULT - NSTOBACCONEVERSMOKERY/N_GEN_A
----- Message from Anne Lomas 
No

## 2022-12-20 NOTE — ED PROVIDER NOTE - PRINCIPAL DIAGNOSIS
Sarecycline Counseling: Patient advised regarding possible photosensitivity and discoloration of the teeth, skin, lips, tongue and gums. Patient instructed to avoid sunlight, if possible. When exposed to sunlight, patients should wear protective clothing, sunglasses, and sunscreen. The patient was instructed to call the office immediately if the following severe adverse effects occur:  hearing changes, easy bruising/bleeding, severe headache, or vision changes. The patient verbalized understanding of the proper use and possible adverse effects of sarecycline. All of the patient's questions and concerns were addressed. Tremfya Counseling: I discussed with the patient the risks of guselkumab including but not limited to immunosuppression, serious infections, and drug reactions. The patient understands that monitoring is required including a PPD at baseline and must alert us or the primary physician if symptoms of infection or other concerning signs are noted. Opzelura Pregnancy And Lactation Text: There is insufficient data to evaluate drug-associated risk for major birth defects, miscarriage, or other adverse maternal or fetal outcomes. There is a pregnancy registry that monitors pregnancy outcomes in pregnant persons exposed to the medication during pregnancy. It is unknown if this medication is excreted in breast milk. Do not breastfeed during treatment and for about 4 weeks after the last dose. Odomzo Counseling- I discussed with the patient the risks of Odomzo including but not limited to nausea, vomiting, diarrhea, constipation, weight loss, changes in the sense of taste, decreased appetite, muscle spasms, and hair loss. The patient verbalized understanding of the proper use and possible adverse effects of Odomzo. All of the patient's questions and concerns were addressed. Enbrel Pregnancy And Lactation Text: This medication is Pregnancy Category B and is considered safe during pregnancy. It is unknown if this medication is excreted in breast milk. Glycopyrrolate Pregnancy And Lactation Text: This medication is Pregnancy Category B and is considered safe during pregnancy. It is unknown if it is excreted breast milk. Imiquimod Counseling:  I discussed with the patient the risks of imiquimod including but not limited to erythema, scaling, itching, weeping, crusting, and pain. Patient understands that the inflammatory response to imiquimod is variable from person to person and was educated regarded proper titration schedule. If flu-like symptoms develop, patient knows to discontinue the medication and contact us. Drysol Counseling:  I discussed with the patient the risks of drysol/aluminum chloride including but not limited to skin rash, itching, irritation, burning. Zoryve Pregnancy And Lactation Text: It is unknown if this medication can cause problems during pregnancy and breastfeeding. Adbry Pregnancy And Lactation Text: It is unknown if this medication will adversely affect pregnancy or breast feeding. Topical Sulfur Applications Pregnancy And Lactation Text: This medication is considered safe during pregnancy and breast feeding secondary to limited systemic absorption. Imiquimod Pregnancy And Lactation Text: This medication is Pregnancy Category C. It is unknown if this medication is excreted in breast milk. Humira Counseling:  I discussed with the patient the risks of adalimumab including but not limited to myelosuppression, immunosuppression, autoimmune hepatitis, demyelinating diseases, lymphoma, and serious infections. The patient understands that monitoring is required including a PPD at baseline and must alert us or the primary physician if symptoms of infection or other concerning signs are noted. Carac Counseling:  I discussed with the patient the risks of Carac including but not limited to erythema, scaling, itching, weeping, crusting, and pain. Cephalexin Counseling: I counseled the patient regarding use of cephalexin as an antibiotic for prophylactic and/or therapeutic purposes. Cephalexin (commonly prescribed under brand name Keflex) is a cephalosporin antibiotic which is active against numerous classes of bacteria, including most skin bacteria. Side effects may include nausea, diarrhea, gastrointestinal upset, rash, hives, yeast infections, and in rare cases, hepatitis, kidney disease, seizures, fever, confusion, neurologic symptoms, and others. Patients with severe allergies to penicillin medications are cautioned that there is about a 10% incidence of cross-reactivity with cephalosporins. When possible, patients with penicillin allergies should use alternatives to cephalosporins for antibiotic therapy. Cyclosporine Counseling:  I discussed with the patient the risks of cyclosporine including but not limited to hypertension, gingival hyperplasia,myelosuppression, immunosuppression, liver damage, kidney damage, neurotoxicity, lymphoma, and serious infections. The patient understands that monitoring is required including baseline blood pressure, CBC, CMP, lipid panel and uric acid, and then 1-2 times monthly CMP and blood pressure. High Dose Vitamin A Counseling: Side effects reviewed, pt to contact office should one occur. Doxepin Counseling:  Patient advised that the medication is sedating and not to drive a car after taking this medication. Patient informed of potential adverse effects including but not limited to dry mouth, urinary retention, and blurry vision. The patient verbalized understanding of the proper use and possible adverse effects of doxepin. All of the patient's questions and concerns were addressed. Spironolactone Counseling: Patient advised regarding risks of diarrhea, abdominal pain, hyperkalemia, birth defects (for female patients), liver toxicity and renal toxicity. The patient may need blood work to monitor liver and kidney function and potassium levels while on therapy. The patient verbalized understanding of the proper use and possible adverse effects of spironolactone. All of the patient's questions and concerns were addressed. Include Pregnancy/Lactation Warning?: No Itraconazole Counseling:  I discussed with the patient the risks of itraconazole including but not limited to liver damage, nausea/vomiting, neuropathy, and severe allergy. The patient understands that this medication is best absorbed when taken with acidic beverages such as non-diet cola or ginger ale. The patient understands that monitoring is required including baseline LFTs and repeat LFTs at intervals. The patient understands that they are to contact us or the primary physician if concerning signs are noted. Xeljanz Counseling: [place] Counseling: I discussed with the patient the risks of Fred[place] Doreen therapy including increased risk of infection, liver issues, headache, diarrhea, or cold symptoms. Live vaccines should be avoided. They were instructed to call if they have any problems. Clofazimine Pregnancy And Lactation Text: This medication is Pregnancy Category C and isn't considered safe during pregnancy. It is excreted in breast milk. Simponi Counseling:  I discussed with the patient the risks of golimumab including but not limited to myelosuppression, immunosuppression, autoimmune hepatitis, demyelinating diseases, lymphoma, and serious infections. The patient understands that monitoring is required including a PPD at baseline and must alert us or the primary physician if symptoms of infection or other concerning signs are noted. Metronidazole Counseling:  I discussed with the patient the risks of metronidazole including but not limited to seizures, nausea/vomiting, a metallic taste in the mouth, nausea/vomiting and severe allergy. Valtrex Counseling: I discussed with the patient the risks of valacyclovir including but not limited to kidney damage, nausea, vomiting and severe allergy. The patient understands that if the infection seems to be worsening or is not improving, they are to call. Solaraze Counseling:  I discussed with the patient the risks of Solaraze including but not limited to erythema, scaling, itching, weeping, crusting, and pain. Sarecycline Pregnancy And Lactation Text: This medication is Pregnancy Category D and not consider safe during pregnancy. It is also excreted in breast milk. Tremfya Pregnancy And Lactation Text: The risk during pregnancy and breastfeeding is uncertain with this medication. Picato Counseling:  I discussed with the patient the risks of Picato including but not limited to erythema, scaling, itching, weeping, crusting, and pain. Odomzo Pregnancy And Lactation Text: This medication is Pregnancy Category X and is absolutely contraindicated during pregnancy. It is unknown if it is excreted in breast milk. Olanzapine Counseling- I discussed with the patient the common side effects of olanzapine including but are not limited to: lack of energy, dry mouth, increased appetite, sleepiness, tremor, constipation, dizziness, changes in behavior, or restlessness. Explained that teenagers are more likely to experience headaches, abdominal pain, pain in the arms or legs, tiredness, and sleepiness. Serious side effects include but are not limited: increased risk of death in elderly patients who are confused, have memory loss, or dementia-related psychosis; hyperglycemia; increased cholesterol and triglycerides; and weight gain. Valtrex Pregnancy And Lactation Text: this medication is Pregnancy Category B and is considered safe during pregnancy. This medication is not directly found in breast milk but it's metabolite acyclovir is present. Itraconazole Pregnancy And Lactation Text: This medication is Pregnancy Category C and it isn't know if it is safe during pregnancy. It is also excreted in breast milk. Hydroxychloroquine Counseling:  I discussed with the patient that a baseline ophthalmologic exam is needed at the start of therapy and every year thereafter while on therapy. A CBC may also be warranted for monitoring. The side effects of this medication were discussed with the patient, including but not limited to agranulocytosis, aplastic anemia, seizures, rashes, retinopathy, and liver toxicity. Patient instructed to call the office should any adverse effect occur. The patient verbalized understanding of the proper use and possible adverse effects of Plaquenil. All the patient's questions and concerns were addressed. Drysol Pregnancy And Lactation Text: This medication is considered safe during pregnancy and breast feeding. Zyclara Counseling:  I discussed with the patient the risks of imiquimod including but not limited to erythema, scaling, itching, weeping, crusting, and pain. Patient understands that the inflammatory response to imiquimod is variable from person to person and was educated regarded proper titration schedule. If flu-like symptoms develop, patient knows to discontinue the medication and contact us. Propranolol Counseling:  I discussed with the patient the risks of propranolol including but not limited to low heart rate, low blood pressure, low blood sugar, restlessness and increased cold sensitivity. They should call the office if they experience any of these side effects. Cimzia Counseling:  I discussed with the patient the risks of Cimzia including but not limited to immunosuppression, allergic reactions and infections. The patient understands that monitoring is required including a PPD at baseline and must alert us or the primary physician if symptoms of infection or other concerning signs are noted. Wartpeel Counseling:  I discussed with the patient the risks of Wartpeel including but not limited to erythema, scaling, itching, weeping, crusting, and pain. Klisyri Counseling:  I discussed with the patient the risks of Gregorio Cuff including but not limited to erythema, scaling, itching, weeping, crusting, and pain. Metronidazole Pregnancy And Lactation Text: This medication is Pregnancy Category B and considered safe during pregnancy. It is also excreted in breast milk. Cephalexin Pregnancy And Lactation Text: This medication is Pregnancy Category B and considered safe during pregnancy. It is also excreted in breast milk but can be used safely for shorter doses. Hydroxychloroquine Pregnancy And Lactation Text: This medication has been shown to cause fetal harm but it isn't assigned a Pregnancy Risk Category. There are small amounts excreted in breast milk. Intracranial bleed Cyclosporine Pregnancy And Lactation Text: This medication is Pregnancy Category C and it isn't know if it is safe during pregnancy. This medication is excreted in breast milk. Spironolactone Pregnancy And Lactation Text: This medication can cause feminization of the male fetus and should be avoided during pregnancy. The active metabolite is also found in breast milk. Cibinqo Counseling: I discussed with the patient the risks of Cibinqo therapy including but not limited to common cold, nausea, headache, cold sores, increased blood CPK levels, dizziness, UTIs, fatigue, acne, and vomitting. Live vaccines should be avoided. This medication has been linked to serious infections; higher rate of mortality; malignancy and lymphoproliferative disorders; major adverse cardiovascular events; thrombosis; thrombocytopenia and lymphopenia; lipid elevations; and retinal detachment. High Dose Vitamin A Pregnancy And Lactation Text: High dose vitamin A therapy is contraindicated during pregnancy and breast feeding. Doxepin Pregnancy And Lactation Text: This medication is Pregnancy Category C and it isn't known if it is safe during pregnancy. It is also excreted in breast milk and breast feeding isn't recommended. Carac Pregnancy And Lactation Text: This medication is Pregnancy Category X and contraindicated in pregnancy and in women who may become pregnant. It is unknown if this medication is excreted in breast milk. Xelkiaz Pregnancy And Lactation Text: This medication is Pregnancy Category D and is not considered safe during pregnancy. The risk during breast feeding is also uncertain. Solaraze Pregnancy And Lactation Text: This medication is Pregnancy Category B and is considered safe. There is some data to suggest avoiding during the third trimester. It is unknown if this medication is excreted in breast milk. Colchicine Counseling:  Patient counseled regarding adverse effects including but not limited to stomach upset (nausea, vomiting, stomach pain, or diarrhea). Patient instructed to limit alcohol consumption while taking this medication. Colchicine may reduce blood counts especially with prolonged use. The patient understands that monitoring of kidney function and blood counts may be required, especially at baseline. The patient verbalized understanding of the proper use and possible adverse effects of colchicine. All of the patient's questions and concerns were addressed. Cimzia Pregnancy And Lactation Text: This medication crosses the placenta but can be considered safe in certain situations. Cimzia may be excreted in breast milk. Ketoconazole Counseling:   Patient counseled regarding improving absorption with orange juice. Adverse effects include but are not limited to breast enlargement, headache, diarrhea, nausea, upset stomach, liver function test abnormalities, taste disturbance, and stomach pain. There is a rare possibility of liver failure that can occur when taking ketoconazole. The patient understands that monitoring of LFTs may be required, especially at baseline. The patient verbalized understanding of the proper use and possible adverse effects of ketoconazole. All of the patient's questions and concerns were addressed. Tetracycline Counseling: Patient counseled regarding possible photosensitivity and increased risk for sunburn. Patient instructed to avoid sunlight, if possible. When exposed to sunlight, patients should wear protective clothing, sunglasses, and sunscreen. The patient was instructed to call the office immediately if the following severe adverse effects occur:  hearing changes, easy bruising/bleeding, severe headache, or vision changes. The patient verbalized understanding of the proper use and possible adverse effects of tetracycline. All of the patient's questions and concerns were addressed. Patient understands to avoid pregnancy while on therapy due to potential birth defects. Propranolol Pregnancy And Lactation Text: This medication is Pregnancy Category C and it isn't known if it is safe during pregnancy. It is excreted in breast milk. Elidel Counseling: Patient may experience a mild burning sensation during topical application. Elidel is not approved in children less than 3years of age. There have been case reports of hematologic and skin malignancies in patients using topical calcineurin inhibitors although causality is questionable. Xolair Counseling:  Patient informed of potential adverse effects including but not limited to fever, muscle aches, rash and allergic reactions. The patient verbalized understanding of the proper use and possible adverse effects of Xolair. All of the patient's questions and concerns were addressed. Olanzapine Pregnancy And Lactation Text: This medication is pregnancy category C. There are no adequate and well controlled trials with olanzapine in pregnant females. Olanzapine should be used during pregnancy only if the potential benefit justifies the potential risk to the fetus. In a study in lactating healthy women, olanzapine was excreted in breast milk. It is recommended that women taking olanzapine should not breast feed. Dutasteride Counseling: Dustasteride Counseling:  I discussed with the patient the risks of use of dutasteride including but not limited to decreased libido, decreased ejaculate volume, and gynecomastia. Women who can become pregnant should not handle medication. All of the patient's questions and concerns were addressed. Clindamycin Counseling: I counseled the patient regarding use of clindamycin as an antibiotic for prophylactic and/or therapeutic purposes. Clindamycin is active against numerous classes of bacteria, including skin bacteria. Side effects may include nausea, diarrhea, gastrointestinal upset, rash, hives, yeast infections, and in rare cases, colitis. Ilumya Counseling: I discussed with the patient the risks of tildrakizumab including but not limited to immunosuppression, malignancy, posterior leukoencephalopathy syndrome, and serious infections. The patient understands that monitoring is required including a PPD at baseline and must alert us or the primary physician if symptoms of infection or other concerning signs are noted. Low Dose Naltrexone Counseling- I discussed with the patient the potential risks and side effects of low dose naltrexone including but not limited to: more vivid dreams, headaches, nausea, vomiting, abdominal pain, fatigue, dizziness, and anxiety. Azathioprine Counseling:  I discussed with the patient the risks of azathioprine including but not limited to myelosuppression, immunosuppression, hepatotoxicity, lymphoma, and infections. The patient understands that monitoring is required including baseline LFTs, Creatinine, possible TPMP genotyping and weekly CBCs for the first month and then every 2 weeks thereafter. The patient verbalized understanding of the proper use and possible adverse effects of azathioprine. All of the patient's questions and concerns were addressed. Methotrexate Counseling:  Patient counseled regarding adverse effects of methotrexate including but not limited to nausea, vomiting, abnormalities in liver function tests. Patients may develop mouth sores, rash, diarrhea, and abnormalities in blood counts. The patient understands that monitoring is required including LFT's and blood counts. There is a rare possibility of scarring of the liver and lung problems that can occur when taking methotrexate. Persistent nausea, loss of appetite, pale stools, dark urine, cough, and shortness of breath should be reported immediately. Patient advised to discontinue methotrexate treatment at least three months before attempting to become pregnant. I discussed the need for folate supplements while taking methotrexate. These supplements can decrease side effects during methotrexate treatment. The patient verbalized understanding of the proper use and possible adverse effects of methotrexate. All of the patient's questions and concerns were addressed. Minocycline Counseling: Patient advised regarding possible photosensitivity and discoloration of the teeth, skin, lips, tongue and gums. Patient instructed to avoid sunlight, if possible. When exposed to sunlight, patients should wear protective clothing, sunglasses, and sunscreen. The patient was instructed to call the office immediately if the following severe adverse effects occur:  hearing changes, easy bruising/bleeding, severe headache, or vision changes. The patient verbalized understanding of the proper use and possible adverse effects of minocycline. All of the patient's questions and concerns were addressed. Hydroxyzine Counseling: Patient advised that the medication is sedating and not to drive a car after taking this medication. Patient informed of potential adverse effects including but not limited to dry mouth, urinary retention, and blurry vision. The patient verbalized understanding of the proper use and possible adverse effects of hydroxyzine. All of the patient's questions and concerns were addressed. Skyrizi Counseling: I discussed with the patient the risks of risankizumab-rzaa including but not limited to immunosuppression, and serious infections. The patient understands that monitoring is required including a PPD at baseline and must alert us or the primary physician if symptoms of infection or other concerning signs are noted. Topical Retinoid counseling:  Patient advised to apply a pea-sized amount only at bedtime and wait 30 minutes after washing their face before applying. If too drying, patient may add a non-comedogenic moisturizer. The patient verbalized understanding of the proper use and possible adverse effects of retinoids. All of the patient's questions and concerns were addressed. Cibinqo Pregnancy And Lactation Text: It is unknown if this medication will adversely affect pregnancy or breast feeding. You should not take this medication if you are currently pregnant or planning a pregnancy or while breastfeeding. Klisyri Pregnancy And Lactation Text: It is unknown if this medication can harm a developing fetus or if it is excreted in breast milk. Calcipotriene Counseling: Cantharidin Counseling:  I discussed with the patient the risks of Cantharidin including but not limited to pain, redness, burning, itching, and blistering. Infliximab Counseling:  I discussed with the patient the risks of infliximab including but not limited to myelosuppression, immunosuppression, autoimmune hepatitis, demyelinating diseases, lymphoma, and serious infections. The patient understands that monitoring is required including a PPD at baseline and must alert us or the primary physician if symptoms of infection or other concerning signs are noted. Oral Minoxidil Counseling- I discussed with the patient the risks of oral minoxidil including but not limited to shortness of breath, swelling of the feet or ankles, dizziness, lightheadedness, unwanted hair growth and allergic reaction. The patient verbalized understanding of the proper use and possible adverse effects of oral minoxidil. All of the patient's questions and concerns were addressed. Hydroxyzine Pregnancy And Lactation Text: This medication is not safe during pregnancy and should not be taken. It is also excreted in breast milk and breast feeding isn't recommended. Calcipotriene Pregnancy And Lactation Text: This medication has not been proven safe during pregnancy. It is unknown if this medication is excreted in breast milk. Opioid Counseling: I discussed with the patient the potential side effects of opioids including but not limited to addiction, altered mental status, and depression. I stressed avoiding alcohol, benzodiazepines, muscle relaxants and sleep aids unless specifically okayed by a physician. The patient verbalized understanding of the proper use and possible adverse effects of opioids. All of the patient's questions and concerns were addressed. They were instructed to flush the remaining pills down the toilet if they did not need them for pain. Ketoconazole Pregnancy And Lactation Text: This medication is Pregnancy Category C and it isn't know if it is safe during pregnancy. It is also excreted in breast milk and breast feeding isn't recommended. Olumiant Pregnancy And Lactation Text: Based on animal studies, Damian Georgiana may cause embryo-fetal harm when administered to pregnant women. The medication should not be used in pregnancy. Breastfeeding is not recommended during treatment. Dapsone Counseling: I discussed with the patient the risks of dapsone including but not limited to hemolytic anemia, agranulocytosis, rashes, methemoglobinemia, kidney failure, peripheral neuropathy, headaches, GI upset, and liver toxicity. Patients who start dapsone require monitoring including baseline LFTs and weekly CBCs for the first month, then every month thereafter. The patient verbalized understanding of the proper use and possible adverse effects of dapsone. All of the patient's questions and concerns were addressed. Acitretin Counseling:  I discussed with the patient the risks of acitretin including but not limited to hair loss, dry lips/skin/eyes, liver damage, hyperlipidemia, depression/suicidal ideation, photosensitivity. Serious rare side effects can include but are not limited to pancreatitis, pseudotumor cerebri, bony changes, clot formation/stroke/heart attack. Patient understands that alcohol is contraindicated since it can result in liver toxicity and significantly prolong the elimination of the drug by many years. Cosentyx Counseling:  I discussed with the patient the risks of Cosentyx including but not limited to worsening of Crohn's disease, immunosuppression, allergic reactions and infections. The patient understands that monitoring is required including a PPD at baseline and must alert us or the primary physician if symptoms of infection or other concerning signs are noted. SSKI Counseling:  I discussed with the patient the risks of SSKI including but not limited to thyroid abnormalities, metallic taste, GI upset, fever, headache, acne, arthralgias, paraesthesias, lymphadenopathy, easy bleeding, arrhythmias, and allergic reaction. Aklief counseling:  Patient advised to apply a pea-sized amount only at bedtime and wait 30 minutes after washing their face before applying. If too drying, patient may add a non-comedogenic moisturizer. The most commonly reported side effects including irritation, redness, scaling, dryness, stinging, burning, itching, and increased risk of sunburn. The patient verbalized understanding of the proper use and possible adverse effects of retinoids. All of the patient's questions and concerns were addressed. Xolair Pregnancy And Lactation Text: This medication is Pregnancy Category B and is considered safe during pregnancy. This medication is excreted in breast milk. Protopic Counseling: Patient may experience a mild burning sensation during topical application. Protopic is not approved in children less than 3years of age. There have been case reports of hematologic and skin malignancies in patients using topical calcineurin inhibitors although causality is questionable. Tazorac Pregnancy And Lactation Text: This medication is not safe during pregnancy. It is unknown if this medication is excreted in breast milk. Dutasteride Pregnancy And Lactation Text: This medication is absolutely contraindicated in women, especially during pregnancy and breast feeding. Feminization of male fetuses is possible if taking while pregnant. Clindamycin Pregnancy And Lactation Text: This medication can be used in pregnancy if certain situations. Clindamycin is also present in breast milk. Acitretin Pregnancy And Lactation Text: This medication is Pregnancy Category X and should not be given to women who are pregnant or may become pregnant in the future. This medication is excreted in breast milk. Sski Pregnancy And Lactation Text: This medication is Pregnancy Category D and isn't considered safe during pregnancy. It is excreted in breast milk. Protopic Pregnancy And Lactation Text: This medication is Pregnancy Category C. It is unknown if this medication is excreted in breast milk when applied topically. Albendazole Pregnancy And Lactation Text: This medication is Pregnancy Category C and it isn't known if it is safe during pregnancy. It is also excreted in breast milk. Eucrisa Counseling: Patient may experience a mild burning sensation during topical application. Pecola Alvarez is not approved in children less than 1months of age. Azathioprine Pregnancy And Lactation Text: This medication is Pregnancy Category D and isn't considered safe during pregnancy. It is unknown if this medication is excreted in breast milk. Methotrexate Pregnancy And Lactation Text: This medication is Pregnancy Category X and is known to cause fetal harm. This medication is excreted in breast milk. Olumiant Counseling: I discussed with the patient the risks of Olumiant therapy including but not limited to upper respiratory tract infections, shingles, cold sores, and nausea. Live vaccines should be avoided. This medication has been linked to serious infections; higher rate of mortality; malignancy and lymphoproliferative disorders; major adverse cardiovascular events; thrombosis; gastrointestinal perforations; neutropenia; lymphopenia; anemia; liver enzyme elevations; and lipid elevations. Winlevi Counseling:  I discussed with the patient the risks of topical clascoterone including but not limited to erythema, scaling, itching, and stinging. Patient voiced their understanding. Minoxidil Counseling: Minoxidil is a topical medication which can increase blood flow where it is applied. It is uncertain how this medication increases hair growth. Side effects are uncommon and include stinging and allergic reactions. Low Dose Naltrexone Pregnancy And Lactation Text: Naltrexone is pregnancy category C. There have been no adequate and well-controlled studies in pregnant women. It should be used in pregnancy only if the potential benefit justifies the potential risk to the fetus. Limited data indicates that naltrexone is minimally excreted into breastmilk. Dapsone Pregnancy And Lactation Text: This medication is Pregnancy Category C and is not considered safe during pregnancy or breast feeding. Aklief Pregnancy And Lactation Text: It is unknown if this medication is safe to use during pregnancy. It is unknown if this medication is excreted in breast milk. Breastfeeding women should use the topical cream on the smallest area of the skin for the shortest time needed while breastfeeding. Do not apply to nipple and areola. Terbinafine Counseling: Patient counseling regarding adverse effects of terbinafine including but not limited to headache, diarrhea, rash, upset stomach, liver function test abnormalities, itching, taste/smell disturbance, nausea, abdominal pain, and flatulence. There is a rare possibility of liver failure that can occur when taking terbinafine. The patient understands that a baseline LFT and kidney function test may be required. The patient verbalized understanding of the proper use and possible adverse effects of terbinafine. All of the patient's questions and concerns were addressed. Stelara Counseling:  I discussed with the patient the risks of ustekinumab including but not limited to immunosuppression, malignancy, posterior leukoencephalopathy syndrome, and serious infections. The patient understands that monitoring is required including a PPD at baseline and must alert us or the primary physician if symptoms of infection or other concerning signs are noted. Cantharidin Counseling: Calcipotriene Counseling:  I discussed with the patient the risks of calcipotriene including but not limited to erythema, scaling, itching, and irritation. Opioid Pregnancy And Lactation Text: These medications can lead to premature delivery and should be avoided during pregnancy. These medications are also present in breast milk in small amounts. Tazorac Counseling:  Patient advised that medication is irritating and drying. Patient may need to apply sparingly and wash off after an hour before eventually leaving it on overnight. The patient verbalized understanding of the proper use and possible adverse effects of tazorac. All of the patient's questions and concerns were addressed. Winlevi Pregnancy And Lactation Text: This medication is considered safe during pregnancy and breastfeeding. Oral Minoxidil Pregnancy And Lactation Text: This medication should only be used when clearly needed if you are pregnant, attempting to become pregnant or breast feeding. Topical Clindamycin Counseling: Patient counseled that this medication may cause skin irritation or allergic reactions. In the event of skin irritation, the patient was advised to reduce the amount of the drug applied or use it less frequently. The patient verbalized understanding of the proper use and possible adverse effects of clindamycin. All of the patient's questions and concerns were addressed. Doxycycline Counseling:  Patient counseled regarding possible photosensitivity and increased risk for sunburn. Patient instructed to avoid sunlight, if possible. When exposed to sunlight, patients should wear protective clothing, sunglasses, and sunscreen. The patient was instructed to call the office immediately if the following severe adverse effects occur:  hearing changes, easy bruising/bleeding, severe headache, or vision changes. The patient verbalized understanding of the proper use and possible adverse effects of doxycycline. All of the patient's questions and concerns were addressed. Finasteride Counseling:  I discussed with the patient the risks of use of finasteride including but not limited to decreased libido, decreased ejaculate volume, gynecomastia, and depression. Women should not handle medication. All of the patient's questions and concerns were addressed. Rituxan Counseling:  I discussed with the patient the risks of Rituxan infusions. Side effects can include infusion reactions, severe drug rashes including mucocutaneous reactions, reactivation of latent hepatitis and other infections and rarely progressive multifocal leukoencephalopathy. All of the patient's questions and concerns were addressed. Cellcept Counseling:  I discussed with the patient the risks of mycophenolate mofetil including but not limited to infection/immunosuppression, GI upset, hypokalemia, hypercholesterolemia, bone marrow suppression, lymphoproliferative disorders, malignancy, GI ulceration/bleed/perforation, colitis, interstitial lung disease, kidney failure, progressive multifocal leukoencephalopathy, and birth defects. The patient understands that monitoring is required including a baseline creatinine and regular CBC testing. In addition, patient must alert us immediately if symptoms of infection or other concerning signs are noted. Ivermectin Counseling:  Patient instructed to take medication on an empty stomach with a full glass of water. Patient informed of potential adverse effects including but not limited to nausea, diarrhea, dizziness, itching, and swelling of the extremities or lymph nodes. The patient verbalized understanding of the proper use and possible adverse effects of ivermectin. All of the patient's questions and concerns were addressed. Qbrexza Counseling:  I discussed with the patient the risks of [place] including but not limited to headache, mydriasis, blurred vision, dry eyes, nasal dryness, dry mouth, dry throat, dry skin, urinary hesitation, and constipation. Local skin reactions including erythema, burning, stinging, and itching can also occur. Bexarotene Counseling:  I discussed with the patient the risks of bexarotene including but not limited to hair loss, dry lips/skin/eyes, liver abnormalities, hyperlipidemia, pancreatitis, depression/suicidal ideation, photosensitivity, drug rash/allergic reactions, hypothyroidism, anemia, leukopenia, infection, cataracts, and teratogenicity. Patient understands that they will need regular blood tests to check lipid profile, liver function tests, white blood cell count, thyroid function tests and pregnancy test if applicable. Albendazole Counseling:  I discussed with the patient the risks of albendazole including but not limited to cytopenia, kidney damage, nausea/vomiting and severe allergy. The patient understands that this medication is being used in an off-label manner. Eucrisa Pregnancy And Lactation Text: This medication has not been assigned a Pregnancy Risk Category but animal studies failed to show danger with the topical medication. It is unknown if the medication is excreted in breast milk. Thalidomide Counseling: I discussed with the patient the risks of thalidomide including but not limited to birth defects, anxiety, weakness, chest pain, dizziness, cough and severe allergy. Erivedge Counseling- I discussed with the patient the risks of Erivedge including but not limited to nausea, vomiting, diarrhea, constipation, weight loss, changes in the sense of taste, decreased appetite, muscle spasms, and hair loss. The patient verbalized understanding of the proper use and possible adverse effects of Erivedge. All of the patient's questions and concerns were addressed. Rinvoq Counseling: I discussed with the patient the risks of Rinvoq therapy including but not limited to upper respiratory tract infections, shingles, cold sores, bronchitis, nausea, cough, fever, acne, and headache. Live vaccines should be avoided. This medication has been linked to serious infections; higher rate of mortality; malignancy and lymphoproliferative disorders; major adverse cardiovascular events; thrombosis; thrombocytopenia, anemia, and neutropenia; lipid elevations; liver enzyme elevations; and gastrointestinal perforations. Quinolones Counseling:  I discussed with the patient the risks of fluoroquinolones including but not limited to GI upset, allergic reaction, drug rash, diarrhea, dizziness, photosensitivity, yeast infections, liver function test abnormalities, tendonitis/tendon rupture. Fluconazole Counseling:  Patient counseled regarding adverse effects of fluconazole including but not limited to headache, diarrhea, nausea, upset stomach, liver function test abnormalities, taste disturbance, and stomach pain. There is a rare possibility of liver failure that can occur when taking fluconazole. The patient understands that monitoring of LFTs and kidney function test may be required, especially at baseline. The patient verbalized understanding of the proper use and possible adverse effects of fluconazole. All of the patient's questions and concerns were addressed. Niacinamide Counseling: I recommended taking niacin or niacinamide, also know as vitamin B3, twice daily. Recent evidence suggests that taking vitamin B3 (500 mg twice daily) can reduce the risk of actinic keratoses and non-melanoma skin cancers. Side effects of vitamin B3 include flushing and headache. Cantharidin Pregnancy And Lactation Text: The use of this medication during pregnancy or lactation is not recommended as there is insufficient data. Terbinafine Pregnancy And Lactation Text: This medication is Pregnancy Category B and is considered safe during pregnancy. It is also excreted in breast milk and breast feeding isn't recommended. Gabapentin Counseling: I discussed with the patient the risks of gabapentin including but not limited to dizziness, somnolence, fatigue and ataxia. VTAMA Counseling: I discussed with the patient that Carollynn [place] is not for use in the eyes, mouth or mouth. They should call the office if they develop any signs of allergic reactions to [place]lynn [place]. The patient verbalized understanding of the proper use and possible adverse effects of VTAMA. All of the patient's questions and concerns were addressed. Azelaic Acid Counseling: Patient counseled that medicine may cause skin irritation and to avoid applying near the eyes. In the event of skin irritation, the patient was advised to reduce the amount of the drug applied or use it less frequently. The patient verbalized understanding of the proper use and possible adverse effects of azelaic acid. All of the patient's questions and concerns were addressed. Prednisone Counseling:  I discussed with the patient the risks of prolonged use of prednisone including but not limited to weight gain, insomnia, osteoporosis, mood changes, diabetes, susceptibility to infection, glaucoma and high blood pressure. In cases where prednisone use is prolonged, patients should be monitored with blood pressure checks, serum glucose levels and an eye exam.  Additionally, the patient may need to be placed on GI prophylaxis, PCP prophylaxis, and calcium and vitamin D supplementation and/or a bisphosphonate. The patient verbalized understanding of the proper use and the possible adverse effects of prednisone. All of the patient's questions and concerns were addressed. Topical Clindamycin Pregnancy And Lactation Text: This medication is Pregnancy Category B and is considered safe during pregnancy. It is unknown if it is excreted in breast milk. Otezla Counseling: Clovia Handing Counseling: The side effects of Clovia Handing were discussed with the patient, including but not limited to worsening or new depression, weight loss, diarrhea, nausea, upper respiratory tract infection, and headache. Patient instructed to call the office should any adverse effect occur. The patient verbalized understanding of the proper use and possible adverse effects of Otezla. All the patient's questions and concerns were addressed. Azithromycin Counseling:  I discussed with the patient the risks of azithromycin including but not limited to GI upset, allergic reaction, drug rash, diarrhea, and yeast infections. Dupixent Counseling: I discussed with the patient the risks of dupilumab including but not limited to eye infection and irritation, cold sores, injection site reactions, worsening of asthma, allergic reactions and increased risk of parasitic infection. Live vaccines should be avoided while taking dupilumab. Dupilumab will also interact with certain medications such as warfarin and cyclosporine. The patient understands that monitoring is required and they must alert us or the primary physician if symptoms of infection or other concerning signs are noted. Rituxan Pregnancy And Lactation Text: This medication is Pregnancy Category C and it isn't know if it is safe during pregnancy. It is unknown if this medication is excreted in breast milk but similar antibodies are known to be excreted. Doxycycline Pregnancy And Lactation Text: This medication is Pregnancy Category D and not consider safe during pregnancy. It is also excreted in breast milk but is considered safe for shorter treatment courses. Qbrexza Pregnancy And Lactation Text: There is no available data on Qbrexza use in pregnant women. There is no available data on Qbrexza use in lactation. Arava Counseling:  Patient counseled regarding adverse effects of Arava including but not limited to nausea, vomiting, abnormalities in liver function tests. Patients may develop mouth sores, rash, diarrhea, and abnormalities in blood counts. The patient understands that monitoring is required including LFTs and blood counts. There is a rare possibility of scarring of the liver and lung problems that can occur when taking methotrexate. Persistent nausea, loss of appetite, pale stools, dark urine, cough, and shortness of breath should be reported immediately. Patient advised to discontinue Arava treatment and consult with a physician prior to attempting conception. The patient will have to undergo a treatment to eliminate Arava from the body prior to conception. Dupixent Pregnancy And Lactation Text: This medication likely crosses the placenta but the risk for the fetus is uncertain. This medication is excreted in breast milk. Otezla Pregnancy And Lactation Text: This medication is Pregnancy Category C and it isn't known if it is safe during pregnancy. It is unknown if it is excreted in breast milk. Mirvaso Counseling: [place] is a topical medication which can decrease superficial blood flow where applied. Side effects are uncommon and include stinging, redness and allergic reactions. Niacinamide Pregnancy And Lactation Text: These medications are considered safe during pregnancy. Finasteride Pregnancy And Lactation Text: This medication is absolutely contraindicated during pregnancy. It is unknown if it is excreted in breast milk. Bexarotene Pregnancy And Lactation Text: This medication is Pregnancy Category X and should not be given to women who are pregnant or may become pregnant. This medication should not be used if you are breast feeding. Rinvoq Pregnancy And Lactation Text: Based on animal studies, Rinvoq may cause embryo-fetal harm when administered to pregnant women. The medication should not be used in pregnancy. Breastfeeding is not recommended during treatment and for 6 days after the last dose. 5-Fu Counseling: 5-Fluorouracil Counseling:  I discussed with the patient the risks of 5-fluorouracil including but not limited to erythema, scaling, itching, weeping, crusting, and pain. Rifampin Counseling: I discussed with the patient the risks of rifampin including but not limited to liver damage, kidney damage, red-orange body fluids, nausea/vomiting and severe allergy. Taltz Counseling: I discussed with the patient the risks of ixekizumab including but not limited to immunosuppression, serious infections, worsening of inflammatory bowel disease and drug reactions. The patient understands that monitoring is required including a PPD at baseline and must alert us or the primary physician if symptoms of infection or other concerning signs are noted. Mirvaso Pregnancy And Lactation Text: This medication has not been assigned a Pregnancy Risk Category. It is unknown if the medication is excreted in breast milk. Libtayo Counseling- I discussed with the patient the risks of Libtayo including but not limited to nausea, vomiting, diarrhea, and bone or muscle pain. The patient verbalized understanding of the proper use and possible adverse effects of Libtayo. All of the patient's questions and concerns were addressed. Azithromycin Pregnancy And Lactation Text: This medication is considered safe during pregnancy and is also secreted in breast milk. Topical Ketoconazole Counseling: Patient counseled that this medication may cause skin irritation or allergic reactions. In the event of skin irritation, the patient was advised to reduce the amount of the drug applied or use it less frequently. The patient verbalized understanding of the proper use and possible adverse effects of ketoconazole. All of the patient's questions and concerns were addressed. Hydroquinone Counseling:  Patient advised that medication may result in skin irritation, lightening (hypopigmentation), dryness, and burning. In the event of skin irritation, the patient was advised to reduce the amount of the drug applied or use it less frequently. Rarely, spots that are treated with hydroquinone can become darker (pseudoochronosis). Should this occur, patient instructed to stop medication and call the office. The patient verbalized understanding of the proper use and possible adverse effects of hydroquinone. All of the patient's questions and concerns were addressed. Tranexamic Acid Counseling:  Patient advised of the small risk of bleeding problems with tranexamic acid. They were also instructed to call if they developed any nausea, vomiting or diarrhea. All of the patient's questions and concerns were addressed. Rhofade Counseling: Rhofade is a topical medication which can decrease superficial blood flow where applied. Side effects are uncommon and include stinging, redness and allergic reactions. Cyclophosphamide Counseling:  I discussed with the patient the risks of cyclophosphamide including but not limited to hair loss, hormonal abnormalities, decreased fertility, abdominal pain, diarrhea, nausea and vomiting, bone marrow suppression and infection. The patient understands that monitoring is required while taking this medication. Benzoyl Peroxide Counseling: Patient counseled that medicine may cause skin irritation and bleach clothing. In the event of skin irritation, the patient was advised to reduce the amount of the drug applied or use it less frequently. The patient verbalized understanding of the proper use and possible adverse effects of benzoyl peroxide. All of the patient's questions and concerns were addressed. Erythromycin Counseling:  I discussed with the patient the risks of erythromycin including but not limited to GI upset, allergic reaction, drug rash, diarrhea, increase in liver enzymes, and yeast infections. Oxybutynin Counseling:  I discussed with the patient the risks of oxybutynin including but not limited to skin rash, drowsiness, dry mouth, difficulty urinating, and blurred vision. Bactrim Counseling:  I discussed with the patient the risks of sulfa antibiotics including but not limited to GI upset, allergic reaction, drug rash, diarrhea, dizziness, photosensitivity, and yeast infections. Rarely, more serious reactions can occur including but not limited to aplastic anemia, agranulocytosis, methemoglobinemia, blood dyscrasias, liver or kidney failure, lung infiltrates or desquamative/blistering drug rashes. Enbrel Counseling:  I discussed with the patient the risks of etanercept including but not limited to myelosuppression, immunosuppression, autoimmune hepatitis, demyelinating diseases, lymphoma, and infections. The patient understands that monitoring is required including a PPD at baseline and must alert us or the primary physician if symptoms of infection or other concerning signs are noted. Nsaids Counseling: NSAID Counseling: I discussed with the patient that NSAIDs should be taken with food. Prolonged use of NSAIDs can result in the development of stomach ulcers. Patient advised to stop taking NSAIDs if abdominal pain occurs. The patient verbalized understanding of the proper use and possible adverse effects of NSAIDs. All of the patient's questions and concerns were addressed. Cimetidine Counseling:  I discussed with the patient the risks of Cimetidine including but not limited to gynecomastia, headache, diarrhea, nausea, drowsiness, arrhythmias, pancreatitis, skin rashes, psychosis, bone marrow suppression and kidney toxicity. Birth Control Pills Counseling: Birth Control Pill Counseling: I discussed with the patient the potential side effects of OCPs including but not limited to increased risk of stroke, heart attack, thrombophlebitis, deep venous thrombosis, hepatic adenomas, breast changes, GI upset, headaches, and depression. The patient verbalized understanding of the proper use and possible adverse effects of OCPs. All of the patient's questions and concerns were addressed. Griseofulvin Counseling:  I discussed with the patient the risks of griseofulvin including but not limited to photosensitivity, cytopenia, liver damage, nausea/vomiting and severe allergy. The patient understands that this medication is best absorbed when taken with a fatty meal (e.g., ice cream or french fries). Siliq Counseling:  I discussed with the patient the risks of Siliq including but not limited to new or worsening depression, suicidal thoughts and behavior, immunosuppression, malignancy, posterior leukoencephalopathy syndrome, and serious infections. The patient understands that monitoring is required including a PPD at baseline and must alert us or the primary physician if symptoms of infection or other concerning signs are noted. There is also a special program designed to monitor depression which is required with Siliq. Isotretinoin Counseling: Patient should get monthly blood tests, not donate blood, not drive at night if vision affected, not share medication, and not undergo elective surgery for 6 months after tx completed. Side effects reviewed, pt to contact office should one occur. Sotyktu Counseling:  I discussed the most common side effects of Sotyktu including: common cold, sore throat, sinus infections, cold sores, canker sores, folliculitis, and acne. Â  I also discussed more serious side effects of Sotyktu including but not limited to: serious allergic reactions; increased risk for infections such as TB; cancers such as lymphomas; rhabdomyolysis and elevated CPK; and elevated triglycerides and liver enzymes. Â  Zoryve Counseling:  I discussed with the patient that Tavo Guillermo is not for use in the eyes, mouth or vagina. The most commonly reported side effects include diarrhea, headache, insomnia, application site pain, upper respiratory tract infections, and urinary tract infections. All of the patient's questions and concerns were addressed. Detail Level: Simple Adbry Counseling: I discussed with the patient the risks of tralokinumab including but not limited to eye infection and irritation, cold sores, injection site reactions, worsening of asthma, allergic reactions and increased risk of parasitic infection. Live vaccines should be avoided while taking tralokinumab. The patient understands that monitoring is required and they must alert us or the primary physician if symptoms of infection or other concerning signs are noted. Nsaids Pregnancy And Lactation Text: These medications are considered safe up to 30 weeks gestation. It is excreted in breast milk. Opzelura Counseling:  I discussed with the patient the risks of Tena Griffins including but not limited to nasopharngitis, bronchitis, ear infection, eosinophila, hives, diarrhea, folliculitis, tonsillitis, and rhinorrhea. Taken orally, this medication has been linked to serious infections; higher rate of mortality; malignancy and lymphoproliferative disorders; major adverse cardiovascular events; thrombosis; thrombocytopenia, anemia, and neutropenia; and lipid elevations. Libtayo Pregnancy And Lactation Text: This medication is contraindicated in pregnancy and when breast feeding. Glycopyrrolate Counseling:  I discussed with the patient the risks of glycopyrrolate including but not limited to skin rash, drowsiness, dry mouth, difficulty urinating, and blurred vision. Clofazimine Counseling:  I discussed with the patient the risks of clofazimine including but not limited to skin and eye pigmentation, liver damage, nausea/vomiting, gastrointestinal bleeding and allergy. Isotretinoin Pregnancy And Lactation Text: This medication is Pregnancy Category X and is considered extremely dangerous during pregnancy. It is unknown if it is excreted in breast milk. Benzoyl Peroxide Pregnancy And Lactation Text: This medication is Pregnancy Category C. It is unknown if benzoyl peroxide is excreted in breast milk. Cyclophosphamide Pregnancy And Lactation Text: This medication is Pregnancy Category D and it isn't considered safe during pregnancy. This medication is excreted in breast milk. Topical Sulfur Applications Counseling: Topical Sulfur Counseling: Patient counseled that this medication may cause skin irritation or allergic reactions. In the event of skin irritation, the patient was advised to reduce the amount of the drug applied or use it less frequently. The patient verbalized understanding of the proper use and possible adverse effects of topical sulfur application. All of the patient's questions and concerns were addressed. Bactrim Pregnancy And Lactation Text: This medication is Pregnancy Category D and is known to cause fetal risk. It is also excreted in breast milk. Sotyktu Pregnancy And Lactation Text: There is insufficient data to evaluate whether or not Sotyktu is safe to use during pregnancy. Â  Â It is not known if Sotyktu passes into breast milk and whether or not it is safe to use when breastfeeding. Â Â  Griseofulvin Pregnancy And Lactation Text: This medication is Pregnancy Category X and is known to cause serious birth defects. It is unknown if this medication is excreted in breast milk but breast feeding should be avoided. Tranexamic Acid Pregnancy And Lactation Text: It is unknown if this medication is safe during pregnancy or breast feeding. Rifampin Pregnancy And Lactation Text: This medication is Pregnancy Category C and it isn't know if it is safe during pregnancy. It is also excreted in breast milk and should not be used if you are breast feeding. Erythromycin Pregnancy And Lactation Text: This medication is Pregnancy Category B and is considered safe during pregnancy. It is also excreted in breast milk. Birth Control Pills Pregnancy And Lactation Text: This medication should be avoided if pregnant and for the first 30 days post-partum.

## 2023-08-10 NOTE — OCCUPATIONAL THERAPY INITIAL EVALUATION ADULT - MD ORDER
Patient arrived from ICU.no complain of pain. he responded to questions asked appropriately,lungs clear,apical pulse regular 56 beat/min,no jugular vein distention,abdomen soft with active bowel sounds,no calf pain,no edema. 4:40 He is in bed with his eyes closed,his respirations were even and effortless,sitter care maintained .
Occupational Therapy- Neuro Event prior then admission to Cardiac Cath
OT eval and treat

## 2024-04-04 RX ORDER — METOPROLOL TARTRATE 50 MG
1 TABLET ORAL
Qty: 0 | Refills: 0 | DISCHARGE

## 2024-04-04 RX ORDER — AMLODIPINE BESYLATE 2.5 MG/1
1 TABLET ORAL
Qty: 0 | Refills: 0 | DISCHARGE

## 2024-04-04 RX ORDER — METFORMIN HYDROCHLORIDE 850 MG/1
1 TABLET ORAL
Qty: 0 | Refills: 0 | DISCHARGE

## 2024-09-04 NOTE — OCCUPATIONAL THERAPY INITIAL EVALUATION ADULT - IMPAIRMENTS CONTRIBUTING IMPAIRED BED MOBILITY, REHAB EVAL
No show letter  
impaired balance/impaired coordination/impaired motor control/decreased ROM/decreased strength

## 2024-12-14 NOTE — BRIEF OPERATIVE NOTE - OPERATION/FINDINGS
No 8f drain placed in GB.  thick dark bile.  25 cc aspirated.  tube should stay in place for 4-6 weeks or removed prior with cholecystectomy.  please flush tube daily to maintain patency (order in sunrise)

## 2025-01-20 NOTE — ED ADULT NURSE NOTE - PAIN RATING/NUMBER SCALE (0-10): REST
Pt's daughter called because the pt is having trouble breathing. She wants to know if she can get oxygen or something else for her breathing issues. Dr. Freed told the pt to call if she was having any issues. Please call    [phone]